# Patient Record
Sex: FEMALE | Race: BLACK OR AFRICAN AMERICAN | NOT HISPANIC OR LATINO | Employment: OTHER | URBAN - METROPOLITAN AREA
[De-identification: names, ages, dates, MRNs, and addresses within clinical notes are randomized per-mention and may not be internally consistent; named-entity substitution may affect disease eponyms.]

---

## 2018-06-04 ENCOUNTER — CONVERSION ENCOUNTER (OUTPATIENT)
Dept: FAMILY MEDICINE CLINIC | Facility: CLINIC | Age: 83
End: 2018-06-04

## 2018-06-04 ENCOUNTER — OFFICE VISIT CONVERTED (OUTPATIENT)
Dept: FAMILY MEDICINE CLINIC | Facility: CLINIC | Age: 83
End: 2018-06-04
Attending: PHYSICIAN ASSISTANT

## 2018-06-05 ENCOUNTER — OFFICE VISIT CONVERTED (OUTPATIENT)
Dept: CARDIOLOGY | Facility: CLINIC | Age: 83
End: 2018-06-05
Attending: INTERNAL MEDICINE

## 2018-06-27 ENCOUNTER — OFFICE VISIT CONVERTED (OUTPATIENT)
Dept: PULMONOLOGY | Facility: CLINIC | Age: 83
End: 2018-06-27
Attending: INTERNAL MEDICINE

## 2018-08-10 ENCOUNTER — OFFICE VISIT CONVERTED (OUTPATIENT)
Dept: FAMILY MEDICINE CLINIC | Facility: CLINIC | Age: 83
End: 2018-08-10
Attending: PHYSICIAN ASSISTANT

## 2018-10-09 ENCOUNTER — CONVERSION ENCOUNTER (OUTPATIENT)
Dept: GENERAL RADIOLOGY | Facility: HOSPITAL | Age: 83
End: 2018-10-09

## 2018-10-17 ENCOUNTER — OFFICE VISIT CONVERTED (OUTPATIENT)
Dept: PULMONOLOGY | Facility: CLINIC | Age: 83
End: 2018-10-17
Attending: INTERNAL MEDICINE

## 2018-10-19 ENCOUNTER — CONVERSION ENCOUNTER (OUTPATIENT)
Dept: CARDIOLOGY | Facility: CLINIC | Age: 83
End: 2018-10-19

## 2018-10-19 ENCOUNTER — OFFICE VISIT CONVERTED (OUTPATIENT)
Dept: CARDIOLOGY | Facility: CLINIC | Age: 83
End: 2018-10-19
Attending: NURSE PRACTITIONER

## 2018-12-04 ENCOUNTER — OFFICE VISIT CONVERTED (OUTPATIENT)
Dept: FAMILY MEDICINE CLINIC | Facility: CLINIC | Age: 83
End: 2018-12-04
Attending: PHYSICIAN ASSISTANT

## 2019-01-29 ENCOUNTER — OFFICE VISIT CONVERTED (OUTPATIENT)
Dept: PULMONOLOGY | Facility: CLINIC | Age: 84
End: 2019-01-29
Attending: INTERNAL MEDICINE

## 2019-04-12 ENCOUNTER — HOSPITAL ENCOUNTER (OUTPATIENT)
Dept: LAB | Facility: HOSPITAL | Age: 84
Discharge: HOME | End: 2019-04-12
Attending: NURSE PRACTITIONER

## 2019-04-12 LAB
ALBUMIN SERPL-MCNC: 3.9 G/DL (ref 3.5–5)
ALBUMIN/GLOB SERPL: 1.2 {RATIO} (ref 1.4–2.6)
ALP SERPL-CCNC: 117 U/L (ref 43–160)
ALT SERPL-CCNC: 6 U/L (ref 10–40)
ANION GAP SERPL CALC-SCNC: 17 MMOL/L (ref 8–19)
AST SERPL-CCNC: 16 U/L (ref 15–50)
BASOPHILS # BLD AUTO: 0.03 10*3/UL (ref 0–0.2)
BASOPHILS NFR BLD AUTO: 0.6 % (ref 0–3)
BILIRUB SERPL-MCNC: 0.37 MG/DL (ref 0.2–1.3)
BUN SERPL-MCNC: 18 MG/DL (ref 5–25)
BUN/CREAT SERPL: 23 {RATIO} (ref 6–20)
CALCIUM SERPL-MCNC: 9.4 MG/DL (ref 8.7–10.4)
CHLORIDE SERPL-SCNC: 105 MMOL/L (ref 99–111)
CHOLEST SERPL-MCNC: 142 MG/DL (ref 107–200)
CHOLEST/HDLC SERPL: 1.8 {RATIO} (ref 3–6)
CONV ABS IMM GRAN: 0.01 10*3/UL (ref 0–0.2)
CONV CO2: 29 MMOL/L (ref 22–32)
CONV IMMATURE GRAN: 0.2 % (ref 0–1.8)
CONV TOTAL PROTEIN: 7.1 G/DL (ref 6.3–8.2)
CREAT UR-MCNC: 0.78 MG/DL (ref 0.5–0.9)
DEPRECATED RDW RBC AUTO: 51.1 FL (ref 36.4–46.3)
EOSINOPHIL # BLD AUTO: 0.14 10*3/UL (ref 0–0.7)
EOSINOPHIL # BLD AUTO: 2.7 % (ref 0–7)
ERYTHROCYTE [DISTWIDTH] IN BLOOD BY AUTOMATED COUNT: 14.9 % (ref 11.7–14.4)
GFR SERPLBLD BASED ON 1.73 SQ M-ARVRAT: >60 ML/MIN/{1.73_M2}
GLOBULIN UR ELPH-MCNC: 3.2 G/DL (ref 2–3.5)
GLUCOSE SERPL-MCNC: 85 MG/DL (ref 65–99)
HBA1C MFR BLD: 14.6 G/DL (ref 12–16)
HCT VFR BLD AUTO: 47.5 % (ref 37–47)
HDLC SERPL-MCNC: 77 MG/DL (ref 40–60)
LDLC SERPL CALC-MCNC: 57 MG/DL (ref 70–100)
LYMPHOCYTES # BLD AUTO: 1.14 10*3/UL (ref 1–5)
MCH RBC QN AUTO: 28.5 PG (ref 27–31)
MCHC RBC AUTO-ENTMCNC: 30.7 G/DL (ref 33–37)
MCV RBC AUTO: 92.8 FL (ref 81–99)
MONOCYTES # BLD AUTO: 0.51 10*3/UL (ref 0.2–1.2)
MONOCYTES NFR BLD AUTO: 9.7 % (ref 3–10)
NEUTROPHILS # BLD AUTO: 3.43 10*3/UL (ref 2–8)
NEUTROPHILS NFR BLD AUTO: 65.1 % (ref 30–85)
NRBC CBCN: 0 % (ref 0–0.7)
OSMOLALITY SERPL CALC.SUM OF ELEC: 305 MOSM/KG (ref 273–304)
PLATELET # BLD AUTO: 228 10*3/UL (ref 130–400)
PMV BLD AUTO: 9.9 FL (ref 9.4–12.3)
POTASSIUM SERPL-SCNC: 3.6 MMOL/L (ref 3.5–5.3)
RBC # BLD AUTO: 5.12 10*6/UL (ref 4.2–5.4)
SODIUM SERPL-SCNC: 147 MMOL/L (ref 135–147)
TRIGL SERPL-MCNC: 38 MG/DL (ref 40–150)
VARIANT LYMPHS NFR BLD MANUAL: 21.7 % (ref 20–45)
VLDLC SERPL-MCNC: 8 MG/DL (ref 5–37)
WBC # BLD AUTO: 5.26 10*3/UL (ref 4.8–10.8)

## 2019-04-19 ENCOUNTER — OFFICE VISIT CONVERTED (OUTPATIENT)
Dept: CARDIOLOGY | Facility: CLINIC | Age: 84
End: 2019-04-19
Attending: NURSE PRACTITIONER

## 2019-06-04 ENCOUNTER — OFFICE VISIT CONVERTED (OUTPATIENT)
Dept: FAMILY MEDICINE CLINIC | Facility: CLINIC | Age: 84
End: 2019-06-04
Attending: PHYSICIAN ASSISTANT

## 2019-08-06 ENCOUNTER — HOSPITAL ENCOUNTER (OUTPATIENT)
Dept: LAB | Facility: HOSPITAL | Age: 84
Discharge: HOME | End: 2019-08-06
Attending: PHYSICIAN ASSISTANT

## 2019-08-06 ENCOUNTER — OFFICE VISIT CONVERTED (OUTPATIENT)
Dept: PULMONOLOGY | Facility: CLINIC | Age: 84
End: 2019-08-06
Attending: PHYSICIAN ASSISTANT

## 2019-08-08 LAB — IGE SERPL-ACNC: 701 K[IU]/ML (ref 0–24)

## 2019-08-19 ENCOUNTER — OFFICE VISIT CONVERTED (OUTPATIENT)
Dept: FAMILY MEDICINE CLINIC | Facility: CLINIC | Age: 84
End: 2019-08-19
Attending: PHYSICIAN ASSISTANT

## 2019-08-19 ENCOUNTER — CONVERSION ENCOUNTER (OUTPATIENT)
Dept: FAMILY MEDICINE CLINIC | Facility: CLINIC | Age: 84
End: 2019-08-19

## 2019-08-29 ENCOUNTER — OFFICE VISIT CONVERTED (OUTPATIENT)
Dept: PULMONOLOGY | Facility: CLINIC | Age: 84
End: 2019-08-29
Attending: PHYSICIAN ASSISTANT

## 2019-10-18 ENCOUNTER — OFFICE VISIT CONVERTED (OUTPATIENT)
Dept: CARDIOLOGY | Facility: CLINIC | Age: 84
End: 2019-10-18
Attending: NURSE PRACTITIONER

## 2019-10-31 ENCOUNTER — HOSPITAL ENCOUNTER (OUTPATIENT)
Dept: LAB | Facility: HOSPITAL | Age: 84
Discharge: HOME | End: 2019-10-31
Attending: NURSE PRACTITIONER

## 2019-10-31 LAB
ALBUMIN SERPL-MCNC: 4.1 G/DL (ref 3.5–5)
ALBUMIN/GLOB SERPL: 1.4 {RATIO} (ref 1.4–2.6)
ALP SERPL-CCNC: 119 U/L (ref 43–160)
ALT SERPL-CCNC: <5 U/L (ref 10–40)
ANION GAP SERPL CALC-SCNC: 19 MMOL/L (ref 8–19)
AST SERPL-CCNC: 13 U/L (ref 15–50)
BASOPHILS # BLD AUTO: 0.04 10*3/UL (ref 0–0.2)
BASOPHILS NFR BLD AUTO: 0.8 % (ref 0–3)
BILIRUB SERPL-MCNC: 0.42 MG/DL (ref 0.2–1.3)
BUN SERPL-MCNC: 21 MG/DL (ref 5–25)
BUN/CREAT SERPL: 28 {RATIO} (ref 6–20)
CALCIUM SERPL-MCNC: 9.9 MG/DL (ref 8.7–10.4)
CHLORIDE SERPL-SCNC: 104 MMOL/L (ref 99–111)
CHOLEST SERPL-MCNC: 159 MG/DL (ref 107–200)
CHOLEST/HDLC SERPL: 2.2 {RATIO} (ref 3–6)
CONV ABS IMM GRAN: 0.02 10*3/UL (ref 0–0.2)
CONV CO2: 26 MMOL/L (ref 22–32)
CONV IMMATURE GRAN: 0.4 % (ref 0–1.8)
CONV TOTAL PROTEIN: 7.1 G/DL (ref 6.3–8.2)
CREAT UR-MCNC: 0.74 MG/DL (ref 0.5–0.9)
DEPRECATED RDW RBC AUTO: 51.2 FL (ref 36.4–46.3)
EOSINOPHIL # BLD AUTO: 0.25 10*3/UL (ref 0–0.7)
EOSINOPHIL # BLD AUTO: 4.7 % (ref 0–7)
ERYTHROCYTE [DISTWIDTH] IN BLOOD BY AUTOMATED COUNT: 15.4 % (ref 11.7–14.4)
GFR SERPLBLD BASED ON 1.73 SQ M-ARVRAT: >60 ML/MIN/{1.73_M2}
GLOBULIN UR ELPH-MCNC: 3 G/DL (ref 2–3.5)
GLUCOSE SERPL-MCNC: 94 MG/DL (ref 65–99)
HCT VFR BLD AUTO: 46.2 % (ref 37–47)
HDLC SERPL-MCNC: 73 MG/DL (ref 40–60)
HGB BLD-MCNC: 13.9 G/DL (ref 12–16)
LDLC SERPL CALC-MCNC: 75 MG/DL (ref 70–100)
LYMPHOCYTES # BLD AUTO: 1.47 10*3/UL (ref 1–5)
LYMPHOCYTES NFR BLD AUTO: 27.9 % (ref 20–45)
MCH RBC QN AUTO: 27.4 PG (ref 27–31)
MCHC RBC AUTO-ENTMCNC: 30.1 G/DL (ref 33–37)
MCV RBC AUTO: 91.1 FL (ref 81–99)
MONOCYTES # BLD AUTO: 0.54 10*3/UL (ref 0.2–1.2)
MONOCYTES NFR BLD AUTO: 10.2 % (ref 3–10)
NEUTROPHILS # BLD AUTO: 2.95 10*3/UL (ref 2–8)
NEUTROPHILS NFR BLD AUTO: 56 % (ref 30–85)
NRBC CBCN: 0 % (ref 0–0.7)
OSMOLALITY SERPL CALC.SUM OF ELEC: 303 MOSM/KG (ref 273–304)
PLATELET # BLD AUTO: 245 10*3/UL (ref 130–400)
PMV BLD AUTO: 10 FL (ref 9.4–12.3)
POTASSIUM SERPL-SCNC: 3.8 MMOL/L (ref 3.5–5.3)
RBC # BLD AUTO: 5.07 10*6/UL (ref 4.2–5.4)
SODIUM SERPL-SCNC: 145 MMOL/L (ref 135–147)
TRIGL SERPL-MCNC: 55 MG/DL (ref 40–150)
VLDLC SERPL-MCNC: 11 MG/DL (ref 5–37)
WBC # BLD AUTO: 5.27 10*3/UL (ref 4.8–10.8)

## 2019-12-05 ENCOUNTER — OFFICE VISIT CONVERTED (OUTPATIENT)
Dept: FAMILY MEDICINE CLINIC | Facility: CLINIC | Age: 84
End: 2019-12-05
Attending: PHYSICIAN ASSISTANT

## 2019-12-09 ENCOUNTER — OFFICE VISIT CONVERTED (OUTPATIENT)
Dept: NEUROLOGY | Facility: CLINIC | Age: 84
End: 2019-12-09
Attending: PSYCHIATRY & NEUROLOGY

## 2019-12-16 ENCOUNTER — HOSPITAL ENCOUNTER (OUTPATIENT)
Dept: GENERAL RADIOLOGY | Facility: HOSPITAL | Age: 84
Discharge: HOME | End: 2019-12-16
Attending: PSYCHIATRY & NEUROLOGY

## 2020-01-13 ENCOUNTER — OFFICE VISIT CONVERTED (OUTPATIENT)
Dept: NEUROLOGY | Facility: CLINIC | Age: 85
End: 2020-01-13
Attending: PSYCHIATRY & NEUROLOGY

## 2020-02-06 ENCOUNTER — OFFICE VISIT CONVERTED (OUTPATIENT)
Dept: PULMONOLOGY | Facility: CLINIC | Age: 85
End: 2020-02-06
Attending: INTERNAL MEDICINE

## 2020-03-16 ENCOUNTER — CONVERSION ENCOUNTER (OUTPATIENT)
Dept: FAMILY MEDICINE CLINIC | Facility: CLINIC | Age: 85
End: 2020-03-16

## 2020-03-16 ENCOUNTER — OFFICE VISIT CONVERTED (OUTPATIENT)
Dept: FAMILY MEDICINE CLINIC | Facility: CLINIC | Age: 85
End: 2020-03-16
Attending: PHYSICIAN ASSISTANT

## 2020-04-21 ENCOUNTER — TELEMEDICINE CONVERTED (OUTPATIENT)
Dept: CARDIOLOGY | Facility: CLINIC | Age: 85
End: 2020-04-21
Attending: NURSE PRACTITIONER

## 2020-05-22 ENCOUNTER — OFFICE VISIT CONVERTED (OUTPATIENT)
Dept: PULMONOLOGY | Facility: CLINIC | Age: 85
End: 2020-05-22
Attending: INTERNAL MEDICINE

## 2020-09-23 ENCOUNTER — OFFICE VISIT CONVERTED (OUTPATIENT)
Dept: PULMONOLOGY | Facility: CLINIC | Age: 85
End: 2020-09-23
Attending: NURSE PRACTITIONER

## 2020-10-16 ENCOUNTER — CONVERSION ENCOUNTER (OUTPATIENT)
Dept: NEUROLOGY | Facility: CLINIC | Age: 85
End: 2020-10-16

## 2020-10-16 ENCOUNTER — CONVERSION ENCOUNTER (OUTPATIENT)
Dept: OTHER | Facility: HOSPITAL | Age: 85
End: 2020-10-16

## 2020-10-16 ENCOUNTER — OFFICE VISIT CONVERTED (OUTPATIENT)
Dept: NEUROLOGY | Facility: CLINIC | Age: 85
End: 2020-10-16
Attending: PSYCHIATRY & NEUROLOGY

## 2020-10-22 ENCOUNTER — HOSPITAL ENCOUNTER (OUTPATIENT)
Dept: LAB | Facility: HOSPITAL | Age: 85
Discharge: HOME | End: 2020-10-22
Attending: PHYSICIAN ASSISTANT

## 2020-10-22 LAB
ALBUMIN SERPL-MCNC: 4 G/DL (ref 3.5–5)
ALBUMIN/GLOB SERPL: 1.3 {RATIO} (ref 1.4–2.6)
ALP SERPL-CCNC: 118 U/L (ref 43–160)
ALT SERPL-CCNC: <5 U/L (ref 10–40)
ANION GAP SERPL CALC-SCNC: 14 MMOL/L (ref 8–19)
AST SERPL-CCNC: 15 U/L (ref 15–50)
BASOPHILS # BLD AUTO: 0.02 10*3/UL (ref 0–0.2)
BASOPHILS NFR BLD AUTO: 0.4 % (ref 0–3)
BILIRUB SERPL-MCNC: 0.45 MG/DL (ref 0.2–1.3)
BUN SERPL-MCNC: 19 MG/DL (ref 5–25)
BUN/CREAT SERPL: 24 {RATIO} (ref 6–20)
CALCIUM SERPL-MCNC: 9.9 MG/DL (ref 8.7–10.4)
CHLORIDE SERPL-SCNC: 102 MMOL/L (ref 99–111)
CHOLEST SERPL-MCNC: 148 MG/DL (ref 107–200)
CHOLEST/HDLC SERPL: 2 {RATIO} (ref 3–6)
CONV ABS IMM GRAN: 0.01 10*3/UL (ref 0–0.2)
CONV CO2: 28 MMOL/L (ref 22–32)
CONV IMMATURE GRAN: 0.2 % (ref 0–1.8)
CONV TOTAL PROTEIN: 7.1 G/DL (ref 6.3–8.2)
CREAT UR-MCNC: 0.79 MG/DL (ref 0.5–0.9)
DEPRECATED RDW RBC AUTO: 45.7 FL (ref 36.4–46.3)
EOSINOPHIL # BLD AUTO: 0.31 10*3/UL (ref 0–0.7)
EOSINOPHIL # BLD AUTO: 5.5 % (ref 0–7)
ERYTHROCYTE [DISTWIDTH] IN BLOOD BY AUTOMATED COUNT: 14.6 % (ref 11.7–14.4)
GFR SERPLBLD BASED ON 1.73 SQ M-ARVRAT: >60 ML/MIN/{1.73_M2}
GLOBULIN UR ELPH-MCNC: 3.1 G/DL (ref 2–3.5)
GLUCOSE SERPL-MCNC: 89 MG/DL (ref 65–99)
HCT VFR BLD AUTO: 43.3 % (ref 37–47)
HDLC SERPL-MCNC: 73 MG/DL (ref 40–60)
HGB BLD-MCNC: 13 G/DL (ref 12–16)
LDLC SERPL CALC-MCNC: 64 MG/DL (ref 70–100)
LYMPHOCYTES # BLD AUTO: 1.44 10*3/UL (ref 1–5)
LYMPHOCYTES NFR BLD AUTO: 25.5 % (ref 20–45)
MCH RBC QN AUTO: 26.1 PG (ref 27–31)
MCHC RBC AUTO-ENTMCNC: 30 G/DL (ref 33–37)
MCV RBC AUTO: 86.8 FL (ref 81–99)
MONOCYTES # BLD AUTO: 0.59 10*3/UL (ref 0.2–1.2)
MONOCYTES NFR BLD AUTO: 10.4 % (ref 3–10)
NEUTROPHILS # BLD AUTO: 3.28 10*3/UL (ref 2–8)
NEUTROPHILS NFR BLD AUTO: 58 % (ref 30–85)
NRBC CBCN: 0 % (ref 0–0.7)
OSMOLALITY SERPL CALC.SUM OF ELEC: 292 MOSM/KG (ref 273–304)
PLATELET # BLD AUTO: 252 10*3/UL (ref 130–400)
PMV BLD AUTO: 9.8 FL (ref 9.4–12.3)
POTASSIUM SERPL-SCNC: 4.2 MMOL/L (ref 3.5–5.3)
RBC # BLD AUTO: 4.99 10*6/UL (ref 4.2–5.4)
SODIUM SERPL-SCNC: 140 MMOL/L (ref 135–147)
TRIGL SERPL-MCNC: 56 MG/DL (ref 40–150)
VLDLC SERPL-MCNC: 11 MG/DL (ref 5–37)
WBC # BLD AUTO: 5.65 10*3/UL (ref 4.8–10.8)

## 2020-10-30 ENCOUNTER — OFFICE VISIT CONVERTED (OUTPATIENT)
Dept: CARDIOLOGY | Facility: CLINIC | Age: 85
End: 2020-10-30
Attending: NURSE PRACTITIONER

## 2020-11-09 ENCOUNTER — TELEMEDICINE CONVERTED (OUTPATIENT)
Dept: FAMILY MEDICINE CLINIC | Facility: CLINIC | Age: 85
End: 2020-11-09
Attending: PHYSICIAN ASSISTANT

## 2020-11-23 ENCOUNTER — HOSPITAL ENCOUNTER (OUTPATIENT)
Dept: OTHER | Facility: HOSPITAL | Age: 85
Discharge: HOME | End: 2020-11-23
Attending: PHYSICIAN ASSISTANT

## 2020-11-23 LAB
ALBUMIN SERPL-MCNC: 3 G/DL (ref 3.5–5)
ALBUMIN/GLOB SERPL: 0.8 {RATIO} (ref 1.4–2.6)
ALP SERPL-CCNC: 105 U/L (ref 43–160)
ALT SERPL-CCNC: <5 U/L (ref 10–40)
ANION GAP SERPL CALC-SCNC: 15 MMOL/L (ref 8–19)
AST SERPL-CCNC: 17 U/L (ref 15–50)
BASOPHILS # BLD AUTO: 0.03 10*3/UL (ref 0–0.2)
BASOPHILS NFR BLD AUTO: 0.5 % (ref 0–3)
BILIRUB SERPL-MCNC: 0.38 MG/DL (ref 0.2–1.3)
BUN SERPL-MCNC: 30 MG/DL (ref 5–25)
BUN/CREAT SERPL: 23 {RATIO} (ref 6–20)
CALCIUM SERPL-MCNC: 10.3 MG/DL (ref 8.7–10.4)
CHLORIDE SERPL-SCNC: 106 MMOL/L (ref 99–111)
CONV ABS IMM GRAN: 0.03 10*3/UL (ref 0–0.2)
CONV CO2: 23 MMOL/L (ref 22–32)
CONV IMMATURE GRAN: 0.5 % (ref 0–1.8)
CONV TOTAL PROTEIN: 6.8 G/DL (ref 6.3–8.2)
CREAT UR-MCNC: 1.3 MG/DL (ref 0.5–0.9)
DEPRECATED RDW RBC AUTO: 48.6 FL (ref 36.4–46.3)
EOSINOPHIL # BLD AUTO: 0.06 10*3/UL (ref 0–0.7)
EOSINOPHIL # BLD AUTO: 1 % (ref 0–7)
ERYTHROCYTE [DISTWIDTH] IN BLOOD BY AUTOMATED COUNT: 15.3 % (ref 11.7–14.4)
GFR SERPLBLD BASED ON 1.73 SQ M-ARVRAT: 42 ML/MIN/{1.73_M2}
GLOBULIN UR ELPH-MCNC: 3.8 G/DL (ref 2–3.5)
GLUCOSE SERPL-MCNC: 101 MG/DL (ref 65–99)
HCT VFR BLD AUTO: 44 % (ref 37–47)
HGB BLD-MCNC: 12.9 G/DL (ref 12–16)
LYMPHOCYTES # BLD AUTO: 0.78 10*3/UL (ref 1–5)
LYMPHOCYTES NFR BLD AUTO: 13 % (ref 20–45)
MCH RBC QN AUTO: 25.5 PG (ref 27–31)
MCHC RBC AUTO-ENTMCNC: 29.3 G/DL (ref 33–37)
MCV RBC AUTO: 87.1 FL (ref 81–99)
MONOCYTES # BLD AUTO: 0.45 10*3/UL (ref 0.2–1.2)
MONOCYTES NFR BLD AUTO: 7.5 % (ref 3–10)
NEUTROPHILS # BLD AUTO: 4.67 10*3/UL (ref 2–8)
NEUTROPHILS NFR BLD AUTO: 77.5 % (ref 30–85)
NRBC CBCN: 0 % (ref 0–0.7)
OSMOLALITY SERPL CALC.SUM OF ELEC: 292 MOSM/KG (ref 273–304)
PLATELET # BLD AUTO: 404 10*3/UL (ref 130–400)
PMV BLD AUTO: 10.3 FL (ref 9.4–12.3)
POTASSIUM SERPL-SCNC: 5.7 MMOL/L (ref 3.5–5.3)
RBC # BLD AUTO: 5.05 10*6/UL (ref 4.2–5.4)
SODIUM SERPL-SCNC: 138 MMOL/L (ref 135–147)
WBC # BLD AUTO: 6.02 10*3/UL (ref 4.8–10.8)

## 2020-11-25 ENCOUNTER — HOSPITAL ENCOUNTER (OUTPATIENT)
Dept: OTHER | Facility: HOSPITAL | Age: 85
Discharge: HOME | End: 2020-11-25
Attending: PHYSICIAN ASSISTANT

## 2020-11-25 LAB
APPEARANCE UR: ABNORMAL
BILIRUB UR QL: NEGATIVE
COLOR UR: YELLOW
CONV BACTERIA: ABNORMAL
CONV COLLECTION SOURCE (UA): ABNORMAL
CONV HYALINE CASTS IN URINE MICRO: ABNORMAL /[LPF]
CONV UROBILINOGEN IN URINE BY AUTOMATED TEST STRIP: 0.2 {EHRLICHU}/DL (ref 0.1–1)
GLUCOSE UR QL: NEGATIVE MG/DL
HGB UR QL STRIP: NEGATIVE
KETONES UR QL STRIP: NEGATIVE MG/DL
LEUKOCYTE ESTERASE UR QL STRIP: NEGATIVE
NITRITE UR QL STRIP: NEGATIVE
PH UR STRIP.AUTO: 5 [PH] (ref 5–8)
PROT UR QL: 30 MG/DL
RBC #/AREA URNS HPF: ABNORMAL /[HPF]
SP GR UR: 1.02 (ref 1–1.03)
SQUAMOUS SPT QL MICRO: ABNORMAL /[HPF]
WBC #/AREA URNS HPF: ABNORMAL /[HPF]

## 2020-11-27 LAB — BACTERIA UR CULT: NORMAL

## 2020-12-02 ENCOUNTER — HOSPITAL ENCOUNTER (OUTPATIENT)
Dept: OTHER | Facility: HOSPITAL | Age: 85
Discharge: HOME | End: 2020-12-02
Attending: PHYSICIAN ASSISTANT

## 2020-12-02 LAB
APPEARANCE UR: CLEAR
BILIRUB UR QL: NEGATIVE
COLOR UR: YELLOW
CONV BACTERIA: NEGATIVE
CONV COLLECTION SOURCE (UA): ABNORMAL
CONV UROBILINOGEN IN URINE BY AUTOMATED TEST STRIP: 0.2 {EHRLICHU}/DL (ref 0.1–1)
GLUCOSE UR QL: NEGATIVE MG/DL
HGB UR QL STRIP: ABNORMAL
KETONES UR QL STRIP: NEGATIVE MG/DL
LEUKOCYTE ESTERASE UR QL STRIP: NEGATIVE
NITRITE UR QL STRIP: NEGATIVE
PH UR STRIP.AUTO: 5 [PH] (ref 5–8)
PROT UR QL: NEGATIVE MG/DL
RBC #/AREA URNS HPF: ABNORMAL /[HPF]
SP GR UR: 1.01 (ref 1–1.03)
SQUAMOUS SPT QL MICRO: ABNORMAL /[HPF]
WBC #/AREA URNS HPF: ABNORMAL /[HPF]

## 2020-12-03 LAB — BACTERIA UR CULT: NORMAL

## 2020-12-17 ENCOUNTER — OFFICE VISIT CONVERTED (OUTPATIENT)
Dept: FAMILY MEDICINE CLINIC | Facility: CLINIC | Age: 85
End: 2020-12-17
Attending: PHYSICIAN ASSISTANT

## 2021-01-01 ENCOUNTER — OFFICE VISIT (OUTPATIENT)
Dept: FAMILY MEDICINE CLINIC | Facility: CLINIC | Age: 86
End: 2021-01-01

## 2021-01-01 DIAGNOSIS — J44.9 ASTHMA-COPD OVERLAP SYNDROME (HCC): ICD-10-CM

## 2021-01-01 DIAGNOSIS — Z00.00 MEDICARE ANNUAL WELLNESS VISIT, SUBSEQUENT: Primary | ICD-10-CM

## 2021-01-01 PROCEDURE — 1159F MED LIST DOCD IN RCRD: CPT | Performed by: PHYSICIAN ASSISTANT

## 2021-01-01 PROCEDURE — G0439 PPPS, SUBSEQ VISIT: HCPCS | Performed by: PHYSICIAN ASSISTANT

## 2021-01-01 PROCEDURE — 1125F AMNT PAIN NOTED PAIN PRSNT: CPT | Performed by: PHYSICIAN ASSISTANT

## 2021-01-01 PROCEDURE — 1170F FXNL STATUS ASSESSED: CPT | Performed by: PHYSICIAN ASSISTANT

## 2021-01-01 RX ORDER — TIOTROPIUM BROMIDE AND OLODATEROL 3.124; 2.736 UG/1; UG/1
SPRAY, METERED RESPIRATORY (INHALATION)
Qty: 4 G | Refills: 5 | Status: SHIPPED | OUTPATIENT
Start: 2021-01-01

## 2021-01-01 RX ORDER — AMLODIPINE BESYLATE AND BENAZEPRIL HYDROCHLORIDE 5; 20 MG/1; MG/1
CAPSULE ORAL
Qty: 90 CAPSULE | Refills: 1 | Status: SHIPPED | OUTPATIENT
Start: 2021-01-01 | End: 2022-01-01

## 2021-01-01 RX ORDER — MONTELUKAST SODIUM 10 MG/1
TABLET ORAL
Qty: 30 TABLET | Refills: 3 | Status: SHIPPED | OUTPATIENT
Start: 2021-01-01 | End: 2022-01-01

## 2021-01-01 RX ORDER — APIXABAN 2.5 MG/1
TABLET, FILM COATED ORAL
Qty: 180 TABLET | Refills: 1 | Status: SHIPPED | OUTPATIENT
Start: 2021-01-01 | End: 2022-01-01

## 2021-01-01 RX ORDER — SPIRONOLACTONE 25 MG/1
TABLET ORAL
Qty: 90 TABLET | Refills: 3 | Status: SHIPPED | OUTPATIENT
Start: 2021-01-01 | End: 2022-01-01

## 2021-01-26 ENCOUNTER — OFFICE VISIT CONVERTED (OUTPATIENT)
Dept: PULMONOLOGY | Facility: CLINIC | Age: 86
End: 2021-01-26
Attending: INTERNAL MEDICINE

## 2021-02-12 ENCOUNTER — HOSPITAL ENCOUNTER (OUTPATIENT)
Dept: VACCINE CLINIC | Facility: HOSPITAL | Age: 86
Discharge: HOME | End: 2021-02-12
Attending: INTERNAL MEDICINE

## 2021-03-12 ENCOUNTER — HOSPITAL ENCOUNTER (OUTPATIENT)
Dept: VACCINE CLINIC | Facility: HOSPITAL | Age: 86
Discharge: HOME | End: 2021-03-12
Attending: INTERNAL MEDICINE

## 2021-04-19 ENCOUNTER — CONVERSION ENCOUNTER (OUTPATIENT)
Dept: FAMILY MEDICINE CLINIC | Facility: CLINIC | Age: 86
End: 2021-04-19

## 2021-04-19 ENCOUNTER — OFFICE VISIT CONVERTED (OUTPATIENT)
Dept: FAMILY MEDICINE CLINIC | Facility: CLINIC | Age: 86
End: 2021-04-19
Attending: PHYSICIAN ASSISTANT

## 2021-04-23 ENCOUNTER — HOSPITAL ENCOUNTER (OUTPATIENT)
Dept: LAB | Facility: HOSPITAL | Age: 86
Discharge: HOME | End: 2021-04-23
Attending: NURSE PRACTITIONER

## 2021-04-23 LAB
ALBUMIN SERPL-MCNC: 4.1 G/DL (ref 3.5–5)
ALBUMIN/GLOB SERPL: 1.4 {RATIO} (ref 1.4–2.6)
ALP SERPL-CCNC: 108 U/L (ref 43–160)
ALT SERPL-CCNC: 7 U/L (ref 10–40)
ANION GAP SERPL CALC-SCNC: 12 MMOL/L (ref 8–19)
APPEARANCE UR: CLEAR
AST SERPL-CCNC: 20 U/L (ref 15–50)
BASOPHILS # BLD AUTO: 0.02 10*3/UL (ref 0–0.2)
BASOPHILS NFR BLD AUTO: 0.5 % (ref 0–3)
BILIRUB SERPL-MCNC: 0.32 MG/DL (ref 0.2–1.3)
BILIRUB UR QL: NEGATIVE
BUN SERPL-MCNC: 22 MG/DL (ref 5–25)
BUN/CREAT SERPL: 22 {RATIO} (ref 6–20)
CALCIUM SERPL-MCNC: 9.4 MG/DL (ref 8.7–10.4)
CHLORIDE SERPL-SCNC: 103 MMOL/L (ref 99–111)
CHOLEST SERPL-MCNC: 148 MG/DL (ref 107–200)
CHOLEST/HDLC SERPL: 1.8 {RATIO} (ref 3–6)
COLOR UR: YELLOW
CONV ABS IMM GRAN: 0.01 10*3/UL (ref 0–0.2)
CONV CO2: 29 MMOL/L (ref 22–32)
CONV COLLECTION SOURCE (UA): NORMAL
CONV IMMATURE GRAN: 0.3 % (ref 0–1.8)
CONV TOTAL PROTEIN: 7 G/DL (ref 6.3–8.2)
CONV UROBILINOGEN IN URINE BY AUTOMATED TEST STRIP: 0.2 {EHRLICHU}/DL (ref 0.1–1)
CREAT UR-MCNC: 1.01 MG/DL (ref 0.5–0.9)
DEPRECATED RDW RBC AUTO: 46.6 FL (ref 36.4–46.3)
EOSINOPHIL # BLD AUTO: 0.2 10*3/UL (ref 0–0.7)
EOSINOPHIL # BLD AUTO: 5.2 % (ref 0–7)
ERYTHROCYTE [DISTWIDTH] IN BLOOD BY AUTOMATED COUNT: 13.8 % (ref 11.7–14.4)
GFR SERPLBLD BASED ON 1.73 SQ M-ARVRAT: 57 ML/MIN/{1.73_M2}
GLOBULIN UR ELPH-MCNC: 2.9 G/DL (ref 2–3.5)
GLUCOSE SERPL-MCNC: 79 MG/DL (ref 65–99)
GLUCOSE UR QL: NEGATIVE MG/DL
HCT VFR BLD AUTO: 40.2 % (ref 37–47)
HDLC SERPL-MCNC: 81 MG/DL (ref 40–60)
HGB BLD-MCNC: 12.2 G/DL (ref 12–16)
HGB UR QL STRIP: NEGATIVE
KETONES UR QL STRIP: NEGATIVE MG/DL
LDLC SERPL CALC-MCNC: 58 MG/DL (ref 70–100)
LEUKOCYTE ESTERASE UR QL STRIP: NEGATIVE
LYMPHOCYTES # BLD AUTO: 0.99 10*3/UL (ref 1–5)
LYMPHOCYTES NFR BLD AUTO: 25.6 % (ref 20–45)
MAGNESIUM SERPL-MCNC: 1.99 MG/DL (ref 1.6–2.3)
MCH RBC QN AUTO: 27.7 PG (ref 27–31)
MCHC RBC AUTO-ENTMCNC: 30.3 G/DL (ref 33–37)
MCV RBC AUTO: 91.2 FL (ref 81–99)
MONOCYTES # BLD AUTO: 0.38 10*3/UL (ref 0.2–1.2)
MONOCYTES NFR BLD AUTO: 9.8 % (ref 3–10)
NEUTROPHILS # BLD AUTO: 2.27 10*3/UL (ref 2–8)
NEUTROPHILS NFR BLD AUTO: 58.6 % (ref 30–85)
NITRITE UR QL STRIP: NEGATIVE
NRBC CBCN: 0 % (ref 0–0.7)
OSMOLALITY SERPL CALC.SUM OF ELEC: 290 MOSM/KG (ref 273–304)
PH UR STRIP.AUTO: 5 [PH] (ref 5–8)
PLATELET # BLD AUTO: 215 10*3/UL (ref 130–400)
PMV BLD AUTO: 9.8 FL (ref 9.4–12.3)
POTASSIUM SERPL-SCNC: 4.7 MMOL/L (ref 3.5–5.3)
PROT UR QL: NEGATIVE MG/DL
RBC # BLD AUTO: 4.41 10*6/UL (ref 4.2–5.4)
SODIUM SERPL-SCNC: 139 MMOL/L (ref 135–147)
SP GR UR: 1.02 (ref 1–1.03)
TRIGL SERPL-MCNC: 47 MG/DL (ref 40–150)
TSH SERPL-ACNC: 4.66 M[IU]/L (ref 0.27–4.2)
VLDLC SERPL-MCNC: 9 MG/DL (ref 5–37)
WBC # BLD AUTO: 3.87 10*3/UL (ref 4.8–10.8)

## 2021-04-30 ENCOUNTER — OFFICE VISIT CONVERTED (OUTPATIENT)
Dept: CARDIOLOGY | Facility: CLINIC | Age: 86
End: 2021-04-30
Attending: NURSE PRACTITIONER

## 2021-05-12 NOTE — PROGRESS NOTES
Quick Note      Patient Name: Stacey Juarez   Patient ID: [id number]   Sex: Female   YOB: 1931    Primary Care Provider: Rayshawn Hewitt PA-C   Referring Provider: Rayshawn Hewitt PA-C    Visit Date: April 21, 2020    Provider: HILDA Terrell   Location: [facility]   Location Address: [address] [place] [address], [address]   CHAN Hdez  [id number]   Location Phone: [phone]          History Of Present Illness  TELEHEALTH TELEPHONE VISIT  Chief Complaint: Shortness of breath.   Stacey Juarez is an 88-year-old female who is presenting for evaluation via telehealth telephone visit due to Covid-19. Verbal consent obtained before beginning visit. The patient continues to be short of breath. It is mild and long term for her. Denies any chest pain or pressure. No palpitations, swelling, dizziness, syncope, PND, or orthopnea. Cardiac-wise she is feeling very well. She is seeing Kaini for her lungs and was started on inhalers, and she feels like her breathing has improved overall.   Provider spent 5 minutes with the patient during telehealth visit.   The following staff were present during this visit: Provider only.   Past Medical History/Overview of Patient Symptoms     PAST MEDICAL HISTORY:  Chronic obstructive pulmonary disease; coronary artery disease with prior myocardial infarction; hyperlipidemia; hypertension; nicotine dependence, paroxysmal atrial fibrillation.      FAMILY HISTORY:  Positive for diabetes, hypertension, and heart disease.     PSYCHOSOCIAL HISTORY:  She does not drink alcohol.  She previously smoked but quit.     CURRENT MEDICATIONS:  Alendronate 70 mg once a week; potassium 20 mEq b.i.d.; Fluoxetine 10 mg aspirin 81 mg every other day; Montelukast 10 mg daily; Spironolactone 25 mg daily; Eliquis 2.5 mg b.i.d.; Metoprolol ER 25 mg daily; magnesium oxide 400 mg b.i.d.; Amlodipine/Benazepril 5/10 mg daily; Atorvastatin 40 mg daily; Spiriva; Donepezil 10 mg daily;  nebulizers treatment; Budesonide; Albuterol.  The dosage and frequency of the medications were reviewed with the patient.     LABS: Not done recently due to not going out for fear of Covid-19.    REVIEW OF SYSTEMS:  Admits shortness of breath.  Denies chest pain, palpitations, swelling, chronic or frequent cough, asthma or wheezing.       Vitals     Patient unable to measure blood pressure at home.           Assessment     1.  Coronary artery disease with previous myocardial infarction without angina.   2.  Shortness of breath related to chronic lung disease, stable.   3.  Paroxysmal atrial fibrillation.   4.  Hyperlipidemia, unknown control.   5.  Hypertension, unknown control.  Patient unable to check her blood pressure at home.       Plan     1.  In a month, she will do labs, check CBC, CMP, and a lipid panel.    2.  Follow up in six months with labs again or earlier if needed.       HILDA York    This note was transcribed by Sujata Boothe. Omer  The above service was transcribed by Sujata Boothe, and I attest to the accuracy of the note.  JF/pap             Electronically Signed by: Randa Boothe-, Other -Author on April 27, 2020 02:06:39 PM  Electronically Co-signed by: HILDA Terrell -Reviewer on April 30, 2020 09:50:36 AM

## 2021-05-13 NOTE — PROGRESS NOTES
Progress Note      Patient Name: Stacey Juarez   Patient ID: [id number]   Sex: Female   YOB: 1931    Primary Care Provider: Rayshawn Hewitt PA-C   Referring Provider: Rayshawn Hewitt PA-C    Visit Date: October 30, 2020    Provider: HILDA Terrell   Location: [facility]   Location Address: [address] [place] [address], [address]   [place] Hdez  [id number]   Location Phone: [phone]          Chief Complaint  · Shortness of breath       History Of Present Illness  REFERRING PROVIDER: Rayshawn Hewitt PA-C   Stacey Juarez is an 89 year old female who continues to be short of breath. It is mild with moderate exertion; that is long term and unchanged for her. She did completely stop smoking since her last visit. She denies any chest pain. No palpitations, swelling, dizziness, syncope, PND or orthopnea. Cardiac-wise she is feeing about the same. She has lost 4 pounds since her last visit and admits she does not really get out because of the COVID.   PAST MEDICAL HISTORY: Chronic obstructive pulmonary disease; coronary artery disease with prior myocardial infarction; hyperlipidemia; hypertension; nicotine dependence; paroxysmal atrial fibrillation.   PSYCHOSOCIAL HISTORY: She never used alcohol. She previously used tobacco but quit.   CURRENT MEDICATIONS: include Alendronate 70 mg once a week; Spironolactone 25 mg daily; Amlodipine/Benazepril 5/20 mg daily; Mag-Ox 400 mg b.i.d.; Metoprolol ER 25 mg 1/2 tablet daily; Potassium 15 mL b.i.d.; Eliquis 2.5 mg b.i.d.; Donepezil 5 mg daily; Montelukast 10 mg daily; Atorvastatin 40 mg daily; Budesonide with nebulizer treatments; Albuterol p.r.n.; Stiolto; aspirin 81 mg every other day. The dosage and frequency of the medications were reviewed with the patient.       Review of Systems  · Cardiovascular  o Admits  o : shortness of breath while walking or lying flat  o Denies  o : palpitations (fast, fluttering, or skipping beats), swelling (feet, ankles, hands),  "chest pain or angina pectoris   · Respiratory  o Denies  o : chronic or frequent cough, asthma or wheezing      Vitals  Date Time BP Position Site L\R Cuff Size HR RR TEMP (F) WT  HT  BMI kg/m2 BSA m2 O2 Sat FR L/min FiO2 HC       10/30/2020 09:28 /82 Sitting    68 - R   135lbs 0oz 5'  3\" 23.91 1.65       10/30/2020 09:28 /78 Sitting    64 - R                   Physical Examination  · Constitutional  o Appearance  o : Awake, alert, in no acute distress, somewhat hard of hearing.  · Eyes  o Conjunctivae  o : Conjunctivae normal.  · Ears, Nose, Mouth and Throat  o Oral Cavity  o :   § Oral Mucosa  § : Normal.  · Neck  o Jugular Veins  o : No JVD. Good carotid upstroke. No bruits noted.  · Respiratory  o Respiratory  o : Increased AP diameter with diminished breath sounds. Prolonged expiration. Negative rales or rhonchi.  · Cardiovascular  o Heart  o : PMI is normal. S1, S2 normal. No S3. S4+. Negative systolic/diastolic murmur.   o Peripheral Vascular System  o :   § Extremities  § : Ecchymosis. Good femoral and pedal pulses.   · Gastrointestinal  o Abdominal Examination  o : Soft. No tenderness or masses felt. No hepatosplenomegaly. Abdominal aorta is not palpable.     Most recent labs:  , TRG 56, LDL 64, HDL 73.               Assessment     1.  Coronary artery disease with previous myocardial infarction, without angina.  2.  Shortness of breath related to chronic lung disease, stable.  3.  Paroxysmal atrial arrhythmias, currently in sinus.  4.  Hypertension, fair control for her age.  5.  Hyperlipidemia at goal.       Plan     1.  Continue current medications.  2.  Follow up in 6 months or earlier if needed with labs.  3.  Encouraged her to get her flu shot as soon as possible.    Karen gutiérrez/axel           This note was transcribed by Lindsey Ireland.  [email or web address]/[email or web address]  The above service was transcribed by Lindsey Ireland, and I attest to the accuracy of the note.  MARGUERITE                 Electronically Signed " by: Lindsey Ireland-, -Author on November 4, 2020 08:42:12 AM  Electronically Co-signed by: HILDA Terrell -Reviewer on November 4, 2020 12:53:00 PM

## 2021-05-13 NOTE — PROGRESS NOTES
Progress Note      Patient Name: Stacey Juarez   Patient ID: [id number]   Sex: Female   YOB: 1931    Primary Care Provider: Rayshawn Hewitt PA-C   Referring Provider: Rayshawn Hewitt PA-C    Visit Date: October 16, 2020    Provider: Waqas Sarabia MD   Location: [facility]   Location Address: [address] [place] [address]  [id number]   Location Phone: [phone]          Chief Complaint     4 mo f/u Alzheimer's.       History Of Present Illness  Stacey Juarez is a 89 year old /Black female who presents today to [facility] today referred from Rayshawn Hewitt PA-C.      89-year-old woman with Alzheimer's disease is here today for follow-up.  She is here by herself.  Her grandson dropped her off who is waiting in the car.  She usually comes here with her daughter who is not here with her today.  She states that she manages herself at home when she lives by herself.  She is unaware of her medications but she shows me a list of her medications.       Past Medical History  Abdominal aortic aneurysm; Abnormal EKG; AMI (acute mesenteric ischemia); Arthritis; Atrial fibrillation; Bladder Disorder; Bladder problem; Cataract; Chronic Obstructive Pulmonary Disease; Colon Trouble; COPD (chronic obstructive pulmonary disease); Coronary artery disease; Dementia; Diverticulitis; Essential hypertension; Heart Attack; Hemorrhoids; Hyperlipemia; Hyperlipidemia; Hypertension; Hypertension, Benign Essential; Kidney or Bladder Disease; Limb Swelling; Myocardial Infarction; Osteoporosis; Osteoporosis; Oxygen dependent; Paroxysmal atrial fibrillation; 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; Vitamin D deficiency         Past Surgical History  Back; Back surgery; Bladder reconstruction; Breast Surgery; Carotid Endarterectomy; Colonoscopy; Eye Implant; Hysterectomy; Joint Surgery; Lumbar Disk Surgery         Medication List  alendronate 70 mg oral tablet;  amlodipine-benazepril 5-20 mg oral capsule; ASA 81 mg; atorvastatin 40 mg oral tablet; budesonide 0.5 mg/2 mL inhalation suspension for nebulization; donepezil 10 mg oral tablet; Eliquis 2.5 mg oral tablet; fluoxetine 10 mg oral capsule; magnesium oxide 400 mg magnesium oral tablet; metoprolol succinate 25 mg oral tablet extended release 24 hr; montelukast 10 mg oral tablet; potassium chloride 20 mEq/15 mL oral liquid; spironolactone 25 mg oral tablet; Stiolto Respimat 2.5-2.5 mcg/actuation inhalation mist; Ventolin HFA 90 mcg/actuation inhalation HFA aerosol inhaler         Allergy List  PENICILLINS         Family Medical History  Heart Disease; Family history of certain chronic disabling diseases; arthritis; Family history of heart disease         Reproductive History   1 Para 1 0 0 1       Social History  Active but no formal exercise; Alcohol (Never); Denies substance abuse (Never); Homemaker.; lives alone; No known infection risk; Recreational Drug Use (Never); Retired; Tobacco (Former);          Immunizations  Name Date Admin   Influenza 2018   Influenza 10/19/2015   Influenza 10/27/2014   Influenza 10/14/2013   Influenza 10/01/2012   Msjxszywv05 2011   Prevnar 13 08/10/2018   Shingles 10/01/2012         Review of Systems  · Constitutional  o Denies  o : chills, excessive sweating, fatigue, fever, sycope/passing out, weight gain, weight loss  · Eyes  o Admits  o : changes in vision  o Denies  o : blurry vision, double vision  · HENT  o Denies  o : loss of hearing, ringing in the ears, ear aches, sore throat, nasal congestion, sinus pain, nose bleeds, seasonal allergies  · Cardiovascular  o Denies  o : blood clots, swollen legs, anemia, easy burising or bleeding, transfusions  · Respiratory  o Admits  o : shortness of breath, COPD  o Denies  o : dry cough, productive cough, pneumonia  · Gastrointestinal  o Denies  o : difficulty swallowing, reflux  · Genitourinary  o Denies  o :  "incontinence  · Neurologic  o Denies  o : headache, seizure, stroke, tremor, loss of balance, falls, dizziness/vertigo, difficulty with sleep, numbness/tingling/paresthesia , difficulty with coordination, difficulty with dexterity, weakness  · Musculoskeletal  o Admits  o : joint pain, weakness  o Denies  o : neck stiffness/pain, swollen lymph nodes, muscle aches, spasms, sciatica, pain radiating in arm, pain radiating in leg, low back pain  · Endocrine  o Denies  o : diabetes, thyroid disorder  · Psychiatric  o Admits  o : anxiety, depression      Vitals  Date Time BP Position Site L\R Cuff Size HR RR TEMP (F) WT  HT  BMI kg/m2 BSA m2 O2 Sat FR L/min FiO2 HC       10/16/2020 01:13 PM        97.1           10/16/2020 04:00 /81 Sitting    81 - R   134lbs 0oz 5'  3\" 23.74 1.64             Physical Examination     She is alert, fluent, phasic follows commands well.  I asked her regarding general knowledge questions.  She can tell me that the name of the president is President Stout.  When asked her about the presidential Challenger she told me it was Miguel and I corrected her to Samaria.  When I asked her who the vice presidential Challenger she states that she remembered it but she had left her mind.  She can tell me that Colin was the president when she was growing up.  I asked her how President Stout compares to Colin.  She told me that President Stout is the worst president.  He is not a good person inside.  She tells me that he told people not to wear a mask in this pandemic.  She is able to carry on a conversation with me.  Her heart is bradycardic at 58.  There is no focal weakness.           Assessment  · Alzheimer's Disease       Alzheimer's disease, unspecified     331.0/G30.9  Dementia in other diseases classified elsewhere without behavioral disturbance     331.0/F02.80  She is taking metoprolol which is probably causing her to bradycardia. I will lower the dose of Aricept to 5 mg. I will see " her again in 8 months time for follow-up. 25 minutes was spent for this moderate complexity visit more than half the time spent face-to-face the patient for examination, counseling, planning and recommendations.      Plan  · Medications  o Medications have been Reconciled  o Transition of Care or Provider Policy  · Instructions  o Encouraged to follow-up with Primary Care Provider for preventative care.            Electronically Signed by: Waqas Sarabia MD -Author on October 16, 2020 04:30:11 PM

## 2021-05-13 NOTE — PROGRESS NOTES
Progress Note      Patient Name: Stacey Juarez   Patient ID: [id number]   Sex: Female   YOB: 1931    Primary Care Provider: Rayshawn Hewitt PA-C   Referring Provider: Rayshawn Hewitt PA-C    Visit Date: November 9, 2020    Provider: Rayshawn Hewitt PA-C   Location: [facility]   Location Address: [address] [place] [address], [address]  [place], [place]  [id number]   Location Phone: [phone]          Chief Complaint  · Follow up      History Of Present Illness  Video Conferencing Visit  Stacey Juarez is a 89 year old /Black female who is presenting for evaluation via video conferencing via Phone Call. Verbal consent obtained before beginning visit.   The following staff were present during this visit: Shade Montano MA/Rayshawn Hewitt PA-C   Stacey Juarez is a 89 year old /Black female who presents for evaluation and treatment of: follow up      Pt presents today for a follow up.    Pt offers no complaints at this time.    Labs-10/22/20    Pt is s/p cardio    She does not smoke, using oxygen 24/7 at home    She is feeling ok overall    PHONE CALL ONLY - 11 MINUTES       Past Medical History  Disease Name Date Onset Notes   Abdominal aortic aneurysm --  --    Abnormal EKG 03/10/2015 --    AMI (acute mesenteric ischemia) 03/10/2015 1986   Arthritis --  --    Atrial fibrillation 12/04/2018 --    Bladder Disorder --  --    Bladder problem --  --    Cataract --  --    Chronic Obstructive Pulmonary Disease --  --    Colon Trouble --  --    COPD (chronic obstructive pulmonary disease) --  --    Coronary artery disease --  --    Dementia --  --    Diverticulitis --  --    Essential hypertension 05/10/2017 --    Heart Attack 1986 --    Hemorrhoids --  --    Hyperlipemia --  --    Hyperlipidemia --  --    Hypertension --  --    Hypertension, Benign Essential --  --    Kidney or Bladder Disease --  --    Limb Swelling --  --    Myocardial Infarction 1986 --    Osteoporosis  06/04/2019 --    Osteoporosis --  --    Oxygen dependent 03/24/2020 --    Paroxysmal atrial fibrillation 08/02/2017 --    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 --  --    Vitamin D deficiency 05/08/2015 --          Past Surgical History  Procedure Name Date Notes   Back --  --    Back surgery --  --    Bladder reconstruction --  --    Breast Surgery 1979 --    Carotid Endarterectomy --  --    Colonoscopy --  --    Eye Implant --  yes   Hysterectomy 1971 --    Joint Surgery --  --    Lumbar Disk Surgery 1986 L4-5         Medication List  Name Date Started Instructions   albuterol sulfate 90 mcg/actuation inhalation HFA aerosol inhaler  inhale 2 puffs (180 mcg) by inhalation route every 4-6 hours as needed   alendronate 70 mg oral tablet 11/09/2020 TAKE 1 TABLET BY MOUTH ONCE A WEEK for 30 days   AMLODIPINE-BENAZ 5/20MG CAPSULES 11/04/2020 TAKE ONE CAPSULE BY MOUTH EVERY DAY   amlodipine-benazepril 5-10 mg oral capsule  take 1 capsule by oral route once daily   atorvastatin 40 mg oral tablet 05/19/2020 TAKE 1 TABLET BY MOUTH EVERY NIGHT AT BEDTIME   budesonide 0.5 mg/2 mL inhalation suspension for nebulization  inhale 2 milliliters (0.5 mg) by nebulization route 2 times per day   donepezil 5 mg oral tablet 10/16/2020 take 1 tablet (5 mg) by oral route once daily in the evening for 30 days   Eliquis 2.5 mg oral tablet 01/04/2018 TAKE 1 TABLET BY MOUTH TWICE DAILY   fluoxetine 10 mg oral capsule 03/16/2020 take 1 capsule (10 mg) by oral route once daily   ipratropium bromide 0.02 % inhalation solution  inhale 1.25 milliliters (250 mcg) via nebulizer by inhalation route 3 times per day   metoprolol succinate 25 mg oral tablet extended release 24 hr 09/02/2020 1/2 TAB PO QD OR AS DIRECTED   spironolactone 25 mg oral tablet 08/19/2020 TAKE 1 TABLET BY MOUTH EVERY DAY   Stiolto Respimat 2.5-2.5 mcg/actuation inhalation mist  inhale 2 puffs by inhalation route once daily at the same time each day         Allergy List  Allergen  Name Date Reaction Notes   PENICILLINS --  --  --          Family Medical History  Disease Name Relative/Age Notes   Heart Disease Mother/   Mother   Family history of certain chronic disabling diseases; arthritis Mother/   Mother   Family history of heart disease Father/   Father         Reproductive History  Menstrual   Age Menarche: 11 Age Menopause: 40   Pregnancy Summary   Total Pregnancies: 1 Full Term: 1 Premature: 0   Ab Induced: 0 Ab Spontaneous: 0 Ectopics: 0   Multiples: 0 Livin         Social History  Finding Status Start/Stop Quantity Notes   Active but no formal exercise --  --/-- --  --    Alcohol Never --/-- --  10/16/2020 - 2020 - 2019 - does not drink  08/10/2018 - 2018 -    Denies substance abuse Never --/-- --  2019 -    Homemaker. --  --/-- --  --    lives alone --  --/-- --  --    No known infection risk --  --/-- --  --    Recreational Drug Use Never --/-- --  2020 - no   Retired --  --/-- --  --    Tobacco Former  1PPD 10/16/2020 - 2020 - 2019 - former smoker, smoked 31 or more years  08/10/2018 - quit 1 year ago smoked 20years    --  --/-- --  --          Immunizations  NameDate Admin Mfg Trade Name Lot Number Route Inj VIS Given VIS Publication   Boqyrvsxt97/04/2018 [facility] FLUZONE-HIGH DOSE [id number]  RA 2018   Comments: tolerated well   Hmyxboppj4142/23/2011 NE PNEUMOVAX 23  NE NE 10/09/2017    Comments:    Prevnar 1308/10/2018 [facility] PREVNAR 13 [id number] IM RD 08/10/2018 2015   Comments: tolerated well   Uusogmcn01/2012 NE Not Entered  NE NE 10/03/2012    Comments:          Review of Systems  · Constitutional  o Denies  o : fever, fatigue, weight loss, weight gain  · Cardiovascular  o Denies  o : lower extremity edema, claudication, chest pressure, palpitations  · Respiratory  o Admits  o : shortness of breath  o Denies  o : wheezing, cough, hemoptysis, dyspnea on exertion  · Gastrointestinal  o Denies  o :  nausea, vomiting, diarrhea, constipation, abdominal pain          Assessment  · COPD (chronic obstructive pulmonary disease)     496/J44.9  · Essential hypertension     401.9/I10  · Hyperlipidemia     272.4/E78.5  · Oxygen dependent     V46.2/Z99.81  · Paroxysmal atrial fibrillation     427.31/I48.0  · Coronary artery disease     414.00/I25.10  · Hypertension     401.9/I10  · Osteoporosis     733.02      Plan  · Orders  o ACO-39: Current medications updated and reviewed (, 1159F) - - 11/09/2020  · Medications  o alendronate 70 mg oral tablet   SIG: TAKE 1 TABLET BY MOUTH ONCE A WEEK for 30 days   DISP: (4) Tablet with 5 refills  Refilled on 11/09/2020     o Medications have been Reconciled  o Transition of Care or Provider Policy  · Instructions  o Patient advised to monitor blood pressure (B/P) at home and journal readings. Patient informed that a B/P reading at home of more than 130/80 is considered hypertension. For readings greater ytpm570/90 or higher patient is advised to follow up in the office with readings for management. Patient advised to limit sodium intake.  o Patient was educated and given low cholesterol diet information.  o Recommended exercise program to assist with cholesterol, weight loss and overall health improvement.  o Advised that cheeses and other sources of dairy fats, animal fats, fast food, and the extras (candy, pastries, pies, doughnuts and cookies) all contain LDL raising nutrients. Advised to increase fruits, vegetables, whole grains, and to monitor portion sizes.   o Take all medications as prescribed/directed.  o Patient was educated/instructed on their diagnosis, treatment and medications prior to discharge from the clinic today.  o Discussed Covid-19 precautions including, but not limited to, social distancing, avoid touching your face, and hand washing.   · Disposition  o Call or Return if symptoms worsen or persist.  o F/U in 4-6 months  o Care  Transition            Electronically Signed by: Rayshawn Hewitt PA-C -Author on November 10, 2020 06:47:17 AM

## 2021-05-14 VITALS
OXYGEN SATURATION: 99 % | HEIGHT: 63 IN | DIASTOLIC BLOOD PRESSURE: 63 MMHG | SYSTOLIC BLOOD PRESSURE: 115 MMHG | BODY MASS INDEX: 23.57 KG/M2 | WEIGHT: 133 LBS | HEART RATE: 104 BPM

## 2021-05-14 VITALS
SYSTOLIC BLOOD PRESSURE: 146 MMHG | DIASTOLIC BLOOD PRESSURE: 82 MMHG | BODY MASS INDEX: 23.92 KG/M2 | HEIGHT: 63 IN | WEIGHT: 135 LBS | HEART RATE: 68 BPM

## 2021-05-14 VITALS
SYSTOLIC BLOOD PRESSURE: 134 MMHG | HEART RATE: 81 BPM | BODY MASS INDEX: 23.74 KG/M2 | DIASTOLIC BLOOD PRESSURE: 81 MMHG | WEIGHT: 134 LBS | HEIGHT: 63 IN

## 2021-05-14 VITALS
WEIGHT: 133 LBS | HEART RATE: 68 BPM | DIASTOLIC BLOOD PRESSURE: 66 MMHG | BODY MASS INDEX: 23.57 KG/M2 | HEIGHT: 63 IN | SYSTOLIC BLOOD PRESSURE: 126 MMHG

## 2021-05-14 VITALS
OXYGEN SATURATION: 97 % | DIASTOLIC BLOOD PRESSURE: 60 MMHG | BODY MASS INDEX: 23.23 KG/M2 | WEIGHT: 131.12 LBS | SYSTOLIC BLOOD PRESSURE: 98 MMHG | HEIGHT: 63 IN | HEART RATE: 57 BPM

## 2021-05-14 VITALS — TEMPERATURE: 97.1 F

## 2021-05-14 NOTE — PROGRESS NOTES
Progress Note      Patient Name: Stacey Juarez   Patient ID: [id number]   Sex: Female   YOB: 1931    Primary Care Provider: Rayshawn Hewitt PA-C   Referring Provider: Rayshawn Hewitt PA-C    Visit Date: April 30, 2021    Provider: HILDA Terrell   Location: [facility]   Location Address: [address] [place] [address], [address]   [place]  [id number]   Location Phone: [phone]          Chief Complaint     Shortness of breath.       History Of Present Illness  REFERRING PROVIDER: Rayshawn Hewitt PA-C   Stacey Juarez is an 89 year old /Black female who continues to complain of shortness of breath. It is mild-to-moderate with mild exertion and long-term for her. She is on oxygen now at 2 liters. She denies any chest pain or pressure. No palpitations, swelling, dizziness, syncope, PND, or orthopnea. She continues to not smoke. She has lost a couple of pounds since her last visit. She has had both COVID vaccines.   PAST MEDICAL HISTORY: Chronic obstructive pulmonary disease; Coronary artery disease with prior myocardial infarction; Hyperlipidemia; Hypertension; Nicotine dependence, Paroxysmal atrial fibrillation.   PSYCHOSOCIAL HISTORY: Previous tobacco use, but quit. Denies alcohol use.   CURRENT MEDICATIONS: Alendronate 70 mg once a week; aspirin 81 mg every other day; spironolactone 25 mg daily; amlodipine-benazepril 5-10 mg daily; metoprolol ER 25 mg 1/2 daily; fluoxetine 10 mg daily; magnesium oxide 400 mg b.i.d.; Eliquis 2.5 mg b.i.d.; potassium 15 mL b.i.d.; donepezil 5 mg q. p.m.; montelukast 10 mg daily; atorvastatin 40 mg q. h.s.; budesonide inhalers; Stiolto.      ALLERGIES:  Penicillin.       Review of Systems  · Cardiovascular  o Admits  o : shortness of breath while walking or lying flat  o Denies  o : palpitations (fast, fluttering, or skipping beats), swelling (feet, ankles, hands), chest pain or angina pectoris   · Respiratory  o Denies  o : chronic or frequent  "cough      Vitals  Date Time BP Position Site L\R Cuff Size HR RR TEMP (F) WT  HT  BMI kg/m2 BSA m2 O2 Sat FR L/min FiO2 HC       04/30/2021 09:51 /66 Sitting    68 - R   133lbs 0oz 5'  3\" 23.56 1.64             Physical Examination  · Constitutional  o Appearance  o : Awake, alert, in no acute distress, very hard-of-hearing, on O2 continuously, accompanied by her daughter.   · Eyes  o Conjunctivae  o : Conjunctivae normal.  · Ears, Nose, Mouth and Throat  o Oral Cavity  o :   § Oral Mucosa  § : Normal.  · Neck  o Jugular Veins  o : No JVD. Good carotid upstroke. No bruits noted.  · Respiratory  o Respiratory  o : Increased AP diameter with diminished breath sounds. Prolonged expiration. Negative rales or rhonchi.   · Cardiovascular  o Heart  o : PMI is normal. S1, S2 normal. No S3. S4+. Negative systolic/diastolic murmur.   o Peripheral Vascular System  o :   § Extremities  § : Negative peripheral edema. Good femoral and pedal pulses.   · Gastrointestinal  o Abdominal Examination  o : Soft. No tenderness or masses felt. No hepatosplenomegaly. Abdominal aorta is not palpable.  · Labs  o Labs  o : Hemoglobin 12.2, hematocrit 40.2. Blood sugar 79. BUN 22, creatinine 1.01. Total cholesterol 147, triglycerides 47, HDL 81, LDL 58.           Assessment     1.  Coronary artery disease with previous MI without angina.  2.  Paroxysmal atrial fibrillation, currently in sinus.  3.  Shortness of breath related to chronic lung disease, stable.  4.  Hypertension, controlled.  5.  Hyperlipidemia, at goal.       Plan     1.  Continue Eliquis in view of atrial arrhythmia.    2.  Continue spironolactone, amlodipine-benazepril, metoprolol, and potassium in view of hypertension.  3.  Continue atorvastatin in view of hyperlipidemia.  4.  Follow up in 8 months with labs or earlier if needed.        HILDA York  JF:francisco             Electronically Signed by: Madison Candelario-, Other -Author on May 4, 2021 " 07:10:16 AM  Electronically Co-signed by: HILDA Terrell -Reviewer on May 5, 2021 09:26:05 AM

## 2021-05-14 NOTE — PROGRESS NOTES
Progress Note      Patient Name: Stacey Juarez   Patient ID: [id number]   Sex: Female   YOB: 1931    Primary Care Provider: Rayshawn Hewitt PA-C   Referring Provider: Rayshawn Hewitt PA-C    Visit Date: December 17, 2020    Provider: Rayshawn Hewitt PA-C   [place] [facility] [place]   Location Address: [address] [place] [address], [address]  [place], [place]  [id number]   Location Phone: [phone]          Chief Complaint  · routine follow up      History Of Present Illness  Stacey Juarez is a 89 year old /Black female who presents for evaluation and treatment of: routine follow up.      pt presents today for routine follow up.    pt daughter states couple weeks ago pt didn't want to do anything such as take any medications, eat or get out of bed; Home Health took a sample to see if pt has UTI and everything was good. The like a flip of switch pt started acting normal again.    Labs 11/20  AWV today    Patient states that she is feeling better.  She just started her Prozac.  Granddaughter states that she is now eating taking her medications.  For period of time patient had refused to do that she states that she was depressed and tired living.  She denies any current suicidal thoughts.    Patient is on oxygen 24/7.       Past Medical History  Disease Name Date Onset Notes   Abdominal aortic aneurysm --  --    Abnormal EKG 03/10/2015 --    AMI (acute mesenteric ischemia) 03/10/2015 1986   Arthritis --  --    Atrial fibrillation 12/04/2018 --    Bladder Disorder --  --    Bladder problem --  --    Cataract --  --    Chronic Obstructive Pulmonary Disease --  --    Colon Trouble --  --    COPD (chronic obstructive pulmonary disease) --  --    Coronary artery disease --  --    Dementia --  --    Diverticulitis --  --    Essential hypertension 05/10/2017 --    Heart Attack 1986 --    Hemorrhoids --  --    Hyperlipemia --  --    Hyperlipidemia --  --    Hypertension --  --    Hypertension,  Benign Essential --  --    Kidney or Bladder Disease --  --    Limb Swelling --  --    Myocardial Infarction 1986 --    Osteoporosis 06/04/2019 --    Osteoporosis --  --    Oxygen dependent 03/24/2020 --    Paroxysmal atrial fibrillation 08/02/2017 --    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 --  --    Vitamin D deficiency 05/08/2015 --          Past Surgical History  Procedure Name Date Notes   Back --  --    Back surgery --  --    Bladder reconstruction --  --    Breast Surgery 1979 --    Carotid Endarterectomy --  --    Colonoscopy --  --    Eye Implant --  yes   Hysterectomy 1971 --    Joint Surgery --  --    Lumbar Disk Surgery 1986 L4-5         Medication List  Name Date Started Instructions   albuterol sulfate 90 mcg/actuation inhalation HFA aerosol inhaler  inhale 2 puffs (180 mcg) by inhalation route every 4-6 hours as needed   alendronate 70 mg oral tablet 11/09/2020 TAKE 1 TABLET BY MOUTH ONCE A WEEK for 30 days   amlodipine-benazepril 2.5-10 mg oral capsule 12/17/2020 take 1 capsule by oral route once daily for 30 days   atorvastatin 40 mg oral tablet 05/19/2020 TAKE 1 TABLET BY MOUTH EVERY NIGHT AT BEDTIME   budesonide 0.5 mg/2 mL inhalation suspension for nebulization  inhale 2 milliliters (0.5 mg) by nebulization route 2 times per day   donepezil 5 mg oral tablet 10/16/2020 take 1 tablet (5 mg) by oral route once daily in the evening for 30 days   Eliquis 2.5 mg oral tablet 01/04/2018 TAKE 1 TABLET BY MOUTH TWICE DAILY   fluoxetine 10 mg oral capsule 03/16/2020 take 1 capsule (10 mg) by oral route once daily   ipratropium bromide 0.02 % inhalation solution  inhale 1.25 milliliters (250 mcg) via nebulizer by inhalation route 3 times per day   metoprolol succinate 25 mg oral tablet extended release 24 hr 09/02/2020 1/2 TAB PO QD OR AS DIRECTED   spironolactone 25 mg oral tablet 08/19/2020 TAKE 1 TABLET BY MOUTH EVERY DAY   Stiolto Respimat 2.5-2.5 mcg/actuation inhalation mist  inhale 2 puffs by inhalation  route once daily at the same time each day         Allergy List  Allergen Name Date Reaction Notes   PENICILLINS --  --  --          Family Medical History  Disease Name Relative/Age Notes   Heart Disease Mother/   Mother   Family history of certain chronic disabling diseases; arthritis Mother/   Mother   Family history of heart disease Father/   Father         Reproductive History  Menstrual   Age Menarche: 11 Age Menopause: 40   Pregnancy Summary   Total Pregnancies: 1 Full Term: 1 Premature: 0   Ab Induced: 0 Ab Spontaneous: 0 Ectopics: 0   Multiples: 0 Livin         Social History  Finding Status Start/Stop Quantity Notes   Active but no formal exercise --  --/-- --  --    Alcohol Never --/-- --  10/16/2020 - 2020 - 2019 - does not drink  08/10/2018 - 2018 -    Denies substance abuse Never --/-- --  2019 -    Homemaker. --  --/-- --  --    lives alone --  --/-- --  --    No known infection risk --  --/-- --  --    Recreational Drug Use Never --/-- --  2020 - no   Retired --  --/-- --  --    Tobacco Former  1PPD 10/16/2020 - 2020 - 2019 - former smoker, smoked 31 or more years  08/10/2018 - quit 1 year ago smoked 20years    --  --/-- --  --          Immunizations  NameDate Admin Mfg Trade Name Lot Number Route Inj VIS Given VIS Publication   Sifnadeyf44/17/2020 [facility] FLUZONE-HIGH DOSE [id number] IM LD 2020    Comments: pt tolerated well   Bqomyrrgv3183/23/2011 NE PNEUMOVAX 23  NE NE 10/09/2017    Comments:    Prevnar 1308/10/2018 [facility] PREVNAR 13 [id number] IM RD 08/10/2018 2015   Comments: tolerated well   Ukfnvuhg18/2012 NE Not Entered  NE NE 10/03/2012    Comments:          Review of Systems  · Constitutional  o Denies  o : fever, fatigue, weight loss, weight gain  · Cardiovascular  o Denies  o : lower extremity edema, claudication, chest pressure, palpitations  · Respiratory  o Denies  o : shortness of breath, wheezing, cough, hemoptysis, dyspnea  "on exertion  · Gastrointestinal  o Denies  o : nausea, vomiting, diarrhea, constipation, abdominal pain      Vitals  Date Time BP Position Site L\R Cuff Size HR RR TEMP (F) WT  HT  BMI kg/m2 BSA m2 O2 Sat FR L/min FiO2        12/17/2020 09:35 AM 98/60 Sitting    57 - R   131lbs 2oz 5'  3\" 23.23 1.63 97 %            Physical Examination  · Constitutional  o Appearance  o : thin, well developed  · Head and Face  o Head  o : normocephalic, atraumatic  · Neck  o Inspection/Palpation  o : normal appearance, no masses or tenderness, trachea midline  o Thyroid  o : gland size normal, nontender, no nodules or masses present on palpation  · Respiratory  o Respiratory Effort  o : breathing unlabored  o Inspection of Chest  o : chest rise symmetric bilaterally  o Auscultation of Lungs  o : diminished breath sounds present    · Cardiovascular  o Heart  o :   § Auscultation of Heart  § : regular rate and rhythm, no murmurs, gallops or rubs  o Peripheral Vascular System  o :   § Extremities  § : no edema  · Lymphatic  o Neck  o : no cervical lymphadenopathy, no supraclavicular lymphadenopathy  · Psychiatric  o Mood and Affect  o : mood normal, affect appropriate     2L NC Oxygen           Assessment  · COPD (chronic obstructive pulmonary disease)     496/J44.9  · Hyperlipidemia     272.4/E78.5  · Osteoporosis     733.00/M81.0  · Vitamin D deficiency     268.9/E55.9  · Screening for depression     V79.0/Z13.89  · Need for influenza vaccination     V04.81/Z23  · Oxygen dependent     V46.2/Z99.81  · Paroxysmal atrial fibrillation     427.31/I48.0  · Coronary artery disease     414.00/I25.10  · Osteoporosis     733.02  · Legally blind     369.4/H54.8  · Respiratory failure     518.81/J96.90      Plan  · Orders  o Immunization Admin Fee (Single) ([facility]) (90405) - V04.81/Z23 - 12/17/2020  o Fluzone High Dose Flu Vaccine (36068) - V04.81/Z23 - 12/17/2020   Vaccine - Influenza; Dose: 0.5; Site: Left Deltoid; Route: Intramuscular; Date: " 12/17/2020 10:09:00; Exp: 06/30/2021; Lot: [id number]; Mfg: sanofi pasteur; TradeName: FLUZONE-HIGH DOSE; Administered By: Shade Montano MA; Comment: pt tolerated well  o ACO-39: Current medications updated and reviewed (, 1159F) - - 12/17/2020  · Medications  o amlodipine-benazepril 2.5-10 mg oral capsule   SIG: take 1 capsule by oral route once daily for 30 days   DISP: (30) Capsule with 2 refills  Adjusted on 12/17/2020     o Medications have been Reconciled  o Transition of Care or Provider Policy  · Instructions  o Patient was educated and given low cholesterol diet information.  o Recommended exercise program to assist with cholesterol, weight loss and overall health improvement.  o Advised that cheeses and other sources of dairy fats, animal fats, fast food, and the extras (candy, pastries, pies, doughnuts and cookies) all contain LDL raising nutrients. Advised to increase fruits, vegetables, whole grains, and to monitor portion sizes.   o Depression Screen completed and scanned into the EMR under the designated folder within the patient's documents.  o Today's PHQ-9 result is _4__  o Take all medications as prescribed/directed.  o Patient was educated/instructed on their diagnosis, treatment and medications prior to discharge from the clinic today.  o Discussed Covid-19 precautions including, but not limited to, social distancing, avoid touching your face, and hand washing.   · Disposition  o Call or Return if symptoms worsen or persist.  o F/U in 3 months.  o Care Transition            Electronically Signed by: Rayshawn Hewitt PA-C -Author on December 17, 2020 01:14:43 PM

## 2021-05-14 NOTE — PROGRESS NOTES
Progress Note      Patient Name: Stacey Juarez   Patient ID: [id number]   Sex: Female   YOB: 1931    Primary Care Provider: Rayshawn Hewitt PA-C   Referring Provider: Rayshawn Hewitt PA-C    Visit Date: December 17, 2020    Provider: Rayshawn Hewitt PA-C   Location: [facility]   Location Address: [address] [place] [address], [address]  [place], [place]  [id number]   Location Phone: [phone]          Chief Complaint  · Annual Wellness Exam      History Of Present Illness  The patient is a 89 year old /Black female who has come to this office for her Annual Wellness Visit.   Her Primary Care Provider is Rayshawn Hewitt PA-C. Her comprehensive Care Team list, including suppliers, has been updated on the Facesheet. Her medical/family history, height, weight, BMI, and blood pressure have been reviewed and are in the chart. The Health Risk Assessment has been completed and scanned in the chart.   Medications are listed in the medication list.   The active problem list includes: Abdominal aortic aneurysm, Abnormal EKG, AMI (acute mesenteric ischemia), Arthritis, Atrial fibrillation, Cataract, Colon Trouble, COPD (chronic obstructive pulmonary disease), Coronary artery disease, Diverticulitis, Essential hypertension, Hemorrhoids, Hyperlipidemia, Hypertension, Kidney or Bladder Disease, Myocardial Infarction, Osteoporosis, Osteoporosis, Oxygen dependent, Paroxysmal atrial fibrillation, and Vitamin D deficiency   The patient does not have a history of substance use.   Patient reports her diet is adequate.   The Mini-Cog has been administered and is scanned in chart. The results are negative. Her cognitive function is without limitation.   A hearing loss screen was completed today and the result is negative.   Patient does not have any risk factors for depression. Patient completed the PHQ-9 today and it has been scanned in the chart. The total score is 1-4.   The Timed Up and Go screen was  administered today and the result is negative.   The Soto Index of [place] in ADLs indicated full function (score of 6).   A Falls Risk Assessment has been completed, including a review of home fall hazards and medication review.   Overall, the patient's functional ability is noted by this provider to be within normal limits. Her level of safety is noted to be within normal limits. Her balance/gait is within normal limits. There have been no falls in the past year. Patient-specific home safety recommendations have been reviewed and a copy has been given to patient.   She denies issues with leaking urine.   There are no additional risk factors identified.   Living Will/Advanced Directive has not previously been completed.   Personalized health advice was given to the patient and a written health screening schedule was established; see Plan for details.   Stacey Juarez is a 89 year old /Black female who presents for evaluation and treatment of:       Past Medical History  Disease Name Date Onset Notes   Abdominal aortic aneurysm --  --    Abnormal EKG 03/10/2015 --    AMI (acute mesenteric ischemia) 03/10/2015 1986   Arthritis --  --    Atrial fibrillation 12/04/2018 --    Bladder Disorder --  --    Bladder problem --  --    Cataract --  --    Chronic Obstructive Pulmonary Disease --  --    Colon Trouble --  --    COPD (chronic obstructive pulmonary disease) --  --    Coronary artery disease --  --    Dementia --  --    Diverticulitis --  --    Essential hypertension 05/10/2017 --    Heart Attack 1986 --    Hemorrhoids --  --    Hyperlipemia --  --    Hyperlipidemia --  --    Hypertension --  --    Hypertension, Benign Essential --  --    Kidney or Bladder Disease --  --    Limb Swelling --  --    Myocardial Infarction 1986 --    Osteoporosis 06/04/2019 --    Osteoporosis --  --    Oxygen dependent 03/24/2020 --    Paroxysmal atrial fibrillation 08/02/2017 --    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  --  --    Vitamin D deficiency 05/08/2015 --          Past Surgical History  Procedure Name Date Notes   Back --  --    Back surgery --  --    Bladder reconstruction --  --    Breast Surgery 1979 --    Carotid Endarterectomy --  --    Colonoscopy --  --    Eye Implant --  yes   Hysterectomy 1971 --    Joint Surgery --  --    Lumbar Disk Surgery 1986 L4-5         Medication List  Name Date Started Instructions   albuterol sulfate 90 mcg/actuation inhalation HFA aerosol inhaler  inhale 2 puffs (180 mcg) by inhalation route every 4-6 hours as needed   alendronate 70 mg oral tablet 11/09/2020 TAKE 1 TABLET BY MOUTH ONCE A WEEK for 30 days   amlodipine-benazepril 2.5-10 mg oral capsule 12/17/2020 take 1 capsule by oral route once daily for 30 days   atorvastatin 40 mg oral tablet 05/19/2020 TAKE 1 TABLET BY MOUTH EVERY NIGHT AT BEDTIME   budesonide 0.5 mg/2 mL inhalation suspension for nebulization  inhale 2 milliliters (0.5 mg) by nebulization route 2 times per day   donepezil 5 mg oral tablet 10/16/2020 take 1 tablet (5 mg) by oral route once daily in the evening for 30 days   Eliquis 2.5 mg oral tablet 01/04/2018 TAKE 1 TABLET BY MOUTH TWICE DAILY   fluoxetine 10 mg oral capsule 03/16/2020 take 1 capsule (10 mg) by oral route once daily   ipratropium bromide 0.02 % inhalation solution  inhale 1.25 milliliters (250 mcg) via nebulizer by inhalation route 3 times per day   metoprolol succinate 25 mg oral tablet extended release 24 hr 09/02/2020 1/2 TAB PO QD OR AS DIRECTED   spironolactone 25 mg oral tablet 08/19/2020 TAKE 1 TABLET BY MOUTH EVERY DAY   Stiolto Respimat 2.5-2.5 mcg/actuation inhalation mist  inhale 2 puffs by inhalation route once daily at the same time each day         Allergy List  Allergen Name Date Reaction Notes   PENICILLINS --  --  --          Family Medical History  Disease Name Relative/Age Notes   Heart Disease Mother/   Mother   Family history of certain chronic disabling diseases; arthritis  "Mother/   Mother   Family history of heart disease Father/   Father         Reproductive History  Menstrual   Age Menarche: 11 Age Menopause: 40   Pregnancy Summary   Total Pregnancies: 1 Full Term: 1 Premature: 0   Ab Induced: 0 Ab Spontaneous: 0 Ectopics: 0   Multiples: 0 Livin         Social History  Finding Status Start/Stop Quantity Notes   Active but no formal exercise --  --/-- --  --    Alcohol Never --/-- --  10/16/2020 - 2020 - 2019 - does not drink  08/10/2018 - 2018 -    Denies substance abuse Never --/-- --  2019 -    Homemaker. --  --/-- --  --    lives alone --  --/-- --  --    No known infection risk --  --/-- --  --    Recreational Drug Use Never --/-- --  2020 - no   Retired --  --/-- --  --    Tobacco Former  1PPD 10/16/2020 - 2020 - 2019 - former smoker, smoked 31 or more years  08/10/2018 - quit 1 year ago smoked 20years    --  --/-- --  --          Immunizations  NameDate Admin Mfg Trade Name Lot Number Route Inj VIS Given VIS Publication   Mmqxebsmo36/17/2020 [facility] FLUZONE-HIGH DOSE [id number] IM LD 2020    Comments: pt tolerated well   [id number]/23/2011 [facility] PNEUMOVAX 23  NE NE 10/09/2017    Comments:    Prevnar 1308/10/2018 [facility] PREVNAR 13 [id number] IM RD 08/10/2018 2015   Comments: tolerated well   Ypgvplmx24/2012 NE Not Entered  NE NE 10/03/2012    Comments:          Vitals  Date Time BP Position Site L\R Cuff Size HR RR TEMP (F) WT  HT  BMI kg/m2 BSA m2 O2 Sat FR L/min FiO2 HC       2020 09:35 AM 98/60 Sitting    57 - R   131lbs 2oz 5'  3\" 23.23 1.63 97 %            Physical Examination  · Head and Face  o Head  o : normocephalic, atraumatic  · Ears, Nose, Mouth and Throat  o Ears  o :   § External Ears  § : external auditory canal appearance normal, no discharge present  § Otoscopic Examination  § : tympanic membranes pearly white/gray bilaterally  o Nose  o :   § External Nose  § : no lesions " noted  § Nasopharynx  § : no discharge present  o Oral Cavity  o :   § Oral Mucosa  § : oral mucosa light pink  o Throat  o :   § Oropharynx  § : tonsils without exudate, no palatal petechiae  · Neck  o Inspection/Palpation  o : normal appearance, no masses or tenderness, trachea midline  o Thyroid  o : gland size normal, nontender, no nodules or masses present on palpation  · Respiratory  o Respiratory Effort  o : breathing unlabored  o Inspection of Chest  o : chest rise symmetric bilaterally  o Auscultation of Lungs  o : clear to auscultation bilaterally throughout inspiration and expiration  · Cardiovascular  o Heart  o :   § Auscultation of Heart  § : regular rate and rhythm, no murmurs, gallops or rubs  o Peripheral Vascular System  o :   § Extremities  § : no edema  · Lymphatic  o Neck  o : no cervical lymphadenopathy, no supraclavicular lymphadenopathy  · Psychiatric  o Mood and Affect  o : mood normal, affect appropriate          Assessment  · Encounter for Medicare annual wellness exam     V70.0/Z00.00  · Screening for depression     V79.0/Z13.89  · Screening for alcoholism     V79.1/Z13.39  · Advanced care planning/counseling discussion     V65.49/Z71.89      Plan  · Orders  o Falls Risk Assessment Completed (3288F) - V70.0/Z00.00 - 12/17/2020  o Brief hearing screening (written) [facility] () - V70.0/Z00.00 - 12/17/2020  o Annual Wellness Visit-includes a Personalized Prevention Plan of Service (PPS), SUBSEQUENT VISIT (Medicare) () - V70.0/Z00.00 - 12/17/2020  o Presence or absence of urinary incontinence assessed (ZHOU) (1090F) - V70.0/Z00.00 - 12/17/2020  o Negative alcohol screening () - V79.1/Z13.39 - 12/17/2020  o ACO-39: Current medications updated and reviewed (1159F, ) - - 12/17/2020  o ACO-20: Screening Mammography documented and reviewed () - - 12/17/2020  o ACO-14: Influenza immunization administered or previously received [facility] () - - 12/17/2020  o ACO-15: Pneumococcal  Vaccine Administered or Previously Received (4040F) - - 12/17/2020  o ACO-18: Negative screen for clinical depression using a standardized tool () - - 12/17/2020  o ACO-13: Fall Risk Screening with no falls in past year or only one fall without injury in the past year (1101F) - - 12/17/2020  o ACO - Pt declines to or was not able to provide an Advance Care Plan or name a Surrogate Decision Maker (1124F) - - 12/17/2020  · Medications  o Medications have been Reconciled  o Transition of Care or Provider Policy  · Instructions  o Health Risk Assessment has been reviewed with the patient.  o Written health screening schedule for next 5-10 years was established with patient; information scanned in chart and given/mailed to patient.  o Fall prevention methods discussed and a copy of recommendations given/mailed to patient.  o Today's PHQ-9 score is: 4___  o Depression Screen completed and scanned into the EMR under the designated folder within the patient's documents.  o Patient was educated/instructed on their diagnosis, treatment and medications prior to discharge from the clinic today.            Electronically Signed by: Rayhsawn Hewitt PA-C -Author on December 17, 2020 01:10:02 PM

## 2021-05-14 NOTE — PROGRESS NOTES
Progress Note      Patient Name: Stacey Juarez   Patient ID: [id number]   Sex: Female   YOB: 1931    Primary Care Provider: Rayshawn Hewitt PA-C   Referring Provider: Rayshawn Hewitt PA-C    Visit Date: April 19, 2021    Provider: Rayshawn Hewitt PA-C   Location: [facility]   Location Address: [facility], [address]  [place], [place]  [id number]   Location Phone: [phone]          Chief Complaint  · 4 month follow up      History Of Present Illness  Stacey Juarez is a 89 year old /Black female who presents for evaluation and treatment of: 4 month follow up      Pt presents today for a 4 month follow up.     Pt offers no complaints at this time, states she is doing well. Pt is on 2L O2 24/7.     Pt has received both COVID-19 vaccines, Moderna.     Labs-11/2020    NIDDM - good control    Macular Degen - seeing retinal specialists    HTN - well controlled on Lotrel, metoprolol, spironolactone    Memory issues - stable - donepezil  Hypoxia - well controlled with oxygen 2L NC 24/7    No CP, SOA, HA    Pt is doing well overall       Past Medical History  Disease Name Date Onset Notes   Abdominal aortic aneurysm --  --    Abnormal EKG 03/10/2015 --    AMI (acute mesenteric ischemia) 03/10/2015 1986   Arthritis --  --    Atrial fibrillation 12/04/2018 --    Bladder disorder --  --    Bladder problem --  --    Cataract --  --    Chronic Obstructive Pulmonary Disease --  --    Colon Trouble --  --    COPD (chronic obstructive pulmonary disease) --  --    Coronary artery disease --  --    Dementia --  --    Diverticulitis --  --    Essential hypertension 05/10/2017 --    Heart Attack 1986 --    Hemorrhoids --  --    Hyperlipemia --  --    Hyperlipidemia --  --    Hypertension --  --    Hypertension, Benign Essential --  --    Kidney or Bladder Disease --  --    Limb Swelling --  --    Myocardial Infarction 1986 --    Osteoporosis 06/04/2019 --    Osteoporosis --  --     Oxygen dependent 03/24/2020 --    Paroxysmal atrial fibrillation 08/02/2017 --    Respiratory failure 12/17/2020 --    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 --  --    Vitamin D deficiency 05/08/2015 --          Past Surgical History  Procedure Name Date Notes   Back --  --    Back surgery --  --    Bladder reconstruction --  --    Breast Surgery 1979 --    Carotid Endarterectomy --  --    Colonoscopy --  --    Eye Implant --  yes   Hysterectomy 1971 --    Joint Surgery --  --    Lumbar Disk Surgery 1986 L4-5         Medication List  Name Date Started Instructions   albuterol sulfate 90 mcg/actuation inhalation HFA aerosol inhaler  inhale 2 puffs (180 mcg) by inhalation route every 4-6 hours as needed   alendronate 70 mg oral tablet 11/09/2020 TAKE 1 TABLET BY MOUTH ONCE A WEEK for 30 days   amlodipine-benazepril 2.5-10 mg oral capsule 12/17/2020 take 1 capsule by oral route once daily for 30 days   atorvastatin 40 mg oral tablet 05/19/2020 TAKE 1 TABLET BY MOUTH EVERY NIGHT AT BEDTIME   budesonide 0.5 mg/2 mL inhalation suspension for nebulization  inhale 2 milliliters (0.5 mg) by nebulization route 2 times per day   donepezil 5 mg oral tablet 10/16/2020 take 1 tablet (5 mg) by oral route once daily in the evening for 30 days   Eliquis 2.5 mg oral tablet 01/04/2018 TAKE 1 TABLET BY MOUTH TWICE DAILY   fluoxetine 10 mg oral capsule 01/08/2021 take 1 capsule (10 mg) by oral route once daily for 30 days   ipratropium bromide 0.02 % inhalation solution  inhale 1.25 milliliters (250 mcg) via nebulizer by inhalation route 3 times per day   metoprolol succinate 25 mg oral tablet extended release 24 hr 09/02/2020 1/2 TAB PO QD OR AS DIRECTED   POTASSIUM CHLOR 10% LIQ(20MEQ/15ML) 03/24/2021 TAKE 15 ML BY MOUTH TWICE DAILY   spironolactone 25 mg oral tablet 08/19/2020 TAKE 1 TABLET BY MOUTH EVERY DAY   Stiolto Respimat 2.5-2.5 mcg/actuation inhalation mist  inhale 2 puffs by inhalation route once daily at the same time each day          Allergy List  Allergen Name Date Reaction Notes   PENICILLINS --  --  --        Allergies Reconciled  Family Medical History  Disease Name Relative/Age Notes   Heart Disease Mother/   Mother   Family history of certain chronic disabling diseases; arthritis Mother/   Mother   Family history of heart disease Father/   Father         Reproductive History  Menstrual   Age Menarche: 11 Age Menopause: 40   Pregnancy Summary   Total Pregnancies: 1 Full Term: 1 Premature: 0   Ab Induced: 0 Ab Spontaneous: 0 Ectopics: 0   Multiples: 0 Livin         Social History  Finding Status Start/Stop Quantity Notes   Active but no formal exercise --  --/-- --  --    Alcohol Never --/-- --  10/16/2020 - 2020 - 2019 - does not drink  08/10/2018 - 2018 -    Denies substance abuse Never --/-- --  2019 -    Homemaker. --  --/-- --  --    lives alone --  --/-- --  --    No known infection risk --  --/-- --  --    Recreational Drug Use Never --/-- --  2020 - no   Retired --  --/-- --  --    Tobacco Former  1PPD 10/16/2020 - 2020 - 2019 - former smoker, smoked 31 or more years  08/10/2018 - quit 1 year ago smoked 20years    --  --/-- --  --          Immunizations  NameDate Admin Mfg Trade Name Lot Number Route Inj VIS Given VIS Publication   Yhfxcqfwh68/17/2020 [facility] FLUZONE-HIGH DOSE [id number] IM LD 2020    Comments: pt tolerated well   Creijxbvd9383/23/2011 [facility] PNEUMOVAX 23  NE NE 10/09/2017    Comments:    Prevnar 1308/10/2018 [facility] PREVNAR 13 [id number] IM RD 08/10/2018 2015   Comments: tolerated well   Ungxyhzv50/2012 NE Not Entered  NE NE 10/03/2012    Comments:          Review of Systems  · Constitutional  o Denies  o : fever, fatigue, weight loss, weight gain  · Cardiovascular  o Denies  o : lower extremity edema, claudication, chest pressure, palpitations  · Respiratory  o Denies  o : shortness of breath, wheezing, cough, hemoptysis, dyspnea on  "exertion  · Gastrointestinal  o Denies  o : nausea, vomiting, diarrhea, constipation, abdominal pain      Vitals  Date Time BP Position Site L\R Cuff Size HR RR TEMP (F) WT  HT  BMI kg/m2 BSA m2 O2 Sat FR L/min FiO2 HC       04/19/2021 10:03 /63 Sitting    104 - R   133lbs 0oz 5'  3\" 23.56 1.64 99 %            Physical Examination  · Constitutional  o Appearance  o : well developed, well-nourished, no acute distress  · Ears, Nose, Mouth and Throat  o Ears  o :   § External Ears  § : external auditory canal appearance normal, no discharge present  § Otoscopic Examination  § : tympanic membranes pearly white/gray bilaterally  o Nose  o :   § External Nose  § : no lesions noted  § Nasopharynx  § : no discharge present  o Oral Cavity  o :   § Oral Mucosa  § : oral mucosa light pink  o Throat  o :   § Oropharynx  § : tonsils without exudate, no palatal petechiae  · Neck  o Inspection/Palpation  o : normal appearance, no masses or tenderness, trachea midline  o Thyroid  o : gland size normal, nontender, no nodules or masses present on palpation  · Respiratory  o Respiratory Effort  o : breathing unlabored  o Inspection of Chest  o : chest rise symmetric bilaterally  o Auscultation of Lungs  o : diminished breath sounds present    · Cardiovascular  o Heart  o :   § Auscultation of Heart  § : regular rate, normal rhythm, murmur present   o Peripheral Vascular System  o :   § Extremities  § : mild lower extremity edema present, no cyanosis, no distal hair loss, normal capillary refill  · Lymphatic  o Neck  o : no cervical lymphadenopathy, no supraclavicular lymphadenopathy  · Psychiatric  o Mood and Affect  o : mood normal, affect appropriate     Oxygen via nasal canula 2 L 24/7 - doing well           Assessment  · COPD (chronic obstructive pulmonary disease)     496/J44.9  · Essential hypertension     401.9/I10  · Hyperlipidemia     272.4/E78.5  · Vitamin D deficiency     268.9/E55.9  · Atrial " fibrillation     427.31/I48.91  · Oxygen dependent     V46.2/Z99.81  · Respiratory failure     518.81/J96.90  · Arthritis     V13.4/V13.4  · Coronary artery disease     414.00/I25.10  · Osteoporosis     733.02      Plan  · Orders  o Physical, Primary Care Panel (CBC, CMP, Lipid, TSH) [facility] (79708, [id number], [id number], 89514) - - 04/19/2021  o Urinalysis with Reflex Microscopy ([facility]) (25241) - - 04/19/2021  o ACO-39: Current medications updated and reviewed (, 1159F) - - 04/19/2021  · Medications  o Medications have been Reconciled  o Transition of Care or Provider Policy  · Instructions  o Patient advised to monitor blood pressure (B/P) at home and journal readings. Patient informed that a B/P reading at home of more than 130/80 is considered hypertension. For readings greater plue555/90 or higher patient is advised to follow up in the office with readings for management. Patient advised to limit sodium intake.  o Patient was educated and given low cholesterol diet information.  o Recommended exercise program to assist with cholesterol, weight loss and overall health improvement.  o Advised that cheeses and other sources of dairy fats, animal fats, fast food, and the extras (candy, pastries, pies, doughnuts and cookies) all contain LDL raising nutrients. Advised to increase fruits, vegetables, whole grains, and to monitor portion sizes.   o Take all medications as prescribed/directed.  o Patient instructed/educated on their diet and exercise program.  o Patient was educated/instructed on their diagnosis, treatment and medications prior to discharge from the clinic today.  o Discussed Covid-19 precautions including, but not limited to, social distancing, avoid touching your face, and hand washing.   · Disposition  o Call or Return if symptoms worsen or persist.  o F/U in 4-6 months  o Care Transition            Electronically Signed by: Rayshawn Hewitt PA-C -Author on April 19, 2021 01:50:02 PM

## 2021-05-15 VITALS
OXYGEN SATURATION: 77 % | HEART RATE: 90 BPM | SYSTOLIC BLOOD PRESSURE: 144 MMHG | HEIGHT: 63 IN | DIASTOLIC BLOOD PRESSURE: 72 MMHG | WEIGHT: 133 LBS | BODY MASS INDEX: 23.57 KG/M2

## 2021-05-15 VITALS
HEIGHT: 63 IN | BODY MASS INDEX: 24.45 KG/M2 | HEART RATE: 70 BPM | WEIGHT: 138 LBS | SYSTOLIC BLOOD PRESSURE: 136 MMHG | OXYGEN SATURATION: 96 % | DIASTOLIC BLOOD PRESSURE: 77 MMHG

## 2021-05-15 VITALS
HEART RATE: 70 BPM | OXYGEN SATURATION: 99 % | DIASTOLIC BLOOD PRESSURE: 82 MMHG | HEIGHT: 63 IN | WEIGHT: 142.12 LBS | BODY MASS INDEX: 25.18 KG/M2 | SYSTOLIC BLOOD PRESSURE: 148 MMHG

## 2021-05-15 VITALS
DIASTOLIC BLOOD PRESSURE: 64 MMHG | OXYGEN SATURATION: 98 % | WEIGHT: 139.25 LBS | SYSTOLIC BLOOD PRESSURE: 124 MMHG | BODY MASS INDEX: 24.67 KG/M2 | HEART RATE: 65 BPM | HEIGHT: 63 IN

## 2021-05-15 VITALS
DIASTOLIC BLOOD PRESSURE: 65 MMHG | WEIGHT: 139 LBS | BODY MASS INDEX: 24.63 KG/M2 | HEART RATE: 82 BPM | SYSTOLIC BLOOD PRESSURE: 146 MMHG | HEIGHT: 63 IN

## 2021-05-15 VITALS
WEIGHT: 141 LBS | DIASTOLIC BLOOD PRESSURE: 90 MMHG | BODY MASS INDEX: 24.98 KG/M2 | SYSTOLIC BLOOD PRESSURE: 153 MMHG | HEIGHT: 63 IN | HEART RATE: 101 BPM

## 2021-05-15 VITALS
BODY MASS INDEX: 24.8 KG/M2 | DIASTOLIC BLOOD PRESSURE: 82 MMHG | WEIGHT: 140 LBS | HEIGHT: 63 IN | SYSTOLIC BLOOD PRESSURE: 138 MMHG | HEART RATE: 60 BPM

## 2021-05-15 VITALS
DIASTOLIC BLOOD PRESSURE: 76 MMHG | WEIGHT: 139 LBS | BODY MASS INDEX: 24.63 KG/M2 | HEIGHT: 63 IN | HEART RATE: 70 BPM | SYSTOLIC BLOOD PRESSURE: 126 MMHG

## 2021-05-16 VITALS
BODY MASS INDEX: 24.36 KG/M2 | SYSTOLIC BLOOD PRESSURE: 125 MMHG | HEART RATE: 58 BPM | DIASTOLIC BLOOD PRESSURE: 73 MMHG | OXYGEN SATURATION: 93 % | HEIGHT: 62 IN | WEIGHT: 132.37 LBS

## 2021-05-16 VITALS
DIASTOLIC BLOOD PRESSURE: 70 MMHG | OXYGEN SATURATION: 93 % | RESPIRATION RATE: 16 BRPM | SYSTOLIC BLOOD PRESSURE: 115 MMHG | WEIGHT: 135 LBS | BODY MASS INDEX: 24.84 KG/M2 | HEART RATE: 64 BPM | HEIGHT: 62 IN

## 2021-05-16 VITALS
DIASTOLIC BLOOD PRESSURE: 70 MMHG | WEIGHT: 132 LBS | BODY MASS INDEX: 23.39 KG/M2 | HEIGHT: 63 IN | HEART RATE: 56 BPM | SYSTOLIC BLOOD PRESSURE: 130 MMHG

## 2021-05-16 VITALS
BODY MASS INDEX: 24.1 KG/M2 | HEART RATE: 58 BPM | SYSTOLIC BLOOD PRESSURE: 146 MMHG | WEIGHT: 136 LBS | DIASTOLIC BLOOD PRESSURE: 82 MMHG | HEIGHT: 63 IN

## 2021-05-16 VITALS
BODY MASS INDEX: 24.27 KG/M2 | DIASTOLIC BLOOD PRESSURE: 64 MMHG | HEART RATE: 76 BPM | WEIGHT: 137 LBS | SYSTOLIC BLOOD PRESSURE: 102 MMHG | OXYGEN SATURATION: 98 % | HEIGHT: 63 IN

## 2021-05-28 VITALS
SYSTOLIC BLOOD PRESSURE: 154 MMHG | RESPIRATION RATE: 16 BRPM | TEMPERATURE: 97.8 F | SYSTOLIC BLOOD PRESSURE: 118 MMHG | HEIGHT: 62 IN | BODY MASS INDEX: 25.8 KG/M2 | HEIGHT: 62 IN | RESPIRATION RATE: 16 BRPM | TEMPERATURE: 98.4 F | SYSTOLIC BLOOD PRESSURE: 106 MMHG | BODY MASS INDEX: 24.7 KG/M2 | TEMPERATURE: 98.1 F | HEIGHT: 62 IN | OXYGEN SATURATION: 93 % | DIASTOLIC BLOOD PRESSURE: 60 MMHG | WEIGHT: 140.19 LBS | RESPIRATION RATE: 18 BRPM | HEIGHT: 62 IN | HEART RATE: 61 BPM | OXYGEN SATURATION: 93 % | SYSTOLIC BLOOD PRESSURE: 134 MMHG | BODY MASS INDEX: 24.74 KG/M2 | WEIGHT: 134.44 LBS | HEART RATE: 91 BPM | BODY MASS INDEX: 23.97 KG/M2 | HEART RATE: 62 BPM | HEART RATE: 64 BPM | TEMPERATURE: 98.2 F | DIASTOLIC BLOOD PRESSURE: 72 MMHG | OXYGEN SATURATION: 90 % | OXYGEN SATURATION: 92 % | DIASTOLIC BLOOD PRESSURE: 66 MMHG | WEIGHT: 130.25 LBS | DIASTOLIC BLOOD PRESSURE: 60 MMHG | WEIGHT: 134.25 LBS | RESPIRATION RATE: 16 BRPM

## 2021-05-28 VITALS
RESPIRATION RATE: 13 BRPM | WEIGHT: 140 LBS | BODY MASS INDEX: 25.76 KG/M2 | TEMPERATURE: 98.1 F | SYSTOLIC BLOOD PRESSURE: 114 MMHG | RESPIRATION RATE: 14 BRPM | HEIGHT: 62 IN | WEIGHT: 140 LBS | DIASTOLIC BLOOD PRESSURE: 69 MMHG | SYSTOLIC BLOOD PRESSURE: 127 MMHG | HEIGHT: 62 IN | TEMPERATURE: 98.1 F | OXYGEN SATURATION: 94 % | OXYGEN SATURATION: 94 % | HEART RATE: 75 BPM | BODY MASS INDEX: 25.76 KG/M2 | DIASTOLIC BLOOD PRESSURE: 85 MMHG | HEART RATE: 71 BPM

## 2021-05-28 NOTE — PROGRESS NOTES
Patient: LESTER JUAREZ     Acct: [id number]     Report: #[id number]  UNIT #: [id number]     : 1931    Encounter Date:2020  PRIMARY CARE: RACHEL MUNOZ  ***Signed***  --------------------------------------------------------------------------------------------------------------------  History of Present Illness            Chief Complaint: F/U, COPD            Lester Juarez is presenting for evaluation via Telehealth visit. Verbal consent     obtained before beginning visit.            PAST MEDICAL HISTORY/OVERVIEW OF PATIENT SYMPTOMS            Symptoms: SOA            Any known Exposure to COVID-19: No            Former smoker (1 ppd for 30 years quit for 1 month)            HX: COPD, Respiratory Failure, Emphysema             Provider spent 15 minutes with the patient during telehealth visit.            The following staff were present during this visit: Ann Marie Anne CMA, Lauren STEVENS             The patient is an 89 year old female, patient of Dr. Whiteside's with COPD and     asthma overlap syndrome, chronic hypoxic respiratory failure, elevated IgE     level, history of mold exposure and tobacco abuse with cigarettes in remission     who presents for TeleHealth visit today. The patient states that since last     visit her breathing is doing well.  The patient states that she will get short     of air that is worse with exertion, moderate in severity and improved with rest.     The patient states she is taking Stiolto inhaler everyday as prescribed, gera calzada has been out of budesonide nebulizer treatments for a few days and needs a     refill sent to the pharmacy. The patient states she uses her albuterol     nebulizers as needed. The patient currently denies any cough, wheezing, fever,     chills, night sweats, hemoptysis, purulent sputum production, chest pain, chest     tightness, swollen glands in the head and neck, abdominal pain, nausea, vomiting    or diarrhea.  The patient  denies any headaches, myalgias, sore throat, changes     in sense of taste and/or smell or any other coronavirus or flu-like symptoms.      The patient also states that she is needing some new tubing for her nebulizer     machine. The patient states that she is able to perform all of her ADLs without     difficulty.  The patient states that she has been trying to stay at home due to     the COVID19 pandemic. The patient states she has not had to take any antibiotics    or steroids since last visit and denies any hospitalizations.              I have personally reviewed the review of systems, past family, social, surgical     and medical histories and I agree with those as entered in the chart.              Physical exam deferred due to TeleHealth visit.              I reviewed Dr. Whiteside's last TeleHealth visit note.                     Allergies and Medications      Allergies:        Coded Allergies:             PENICILLINS (Verified  Allergy, Mild, RASH, 9/23/20)      Medications    Last Reconciled on 9/23/20 14:26 by Andrea PAGE-Budesonide (Budesonide) 0.5 Mg/2 Ml Ampul.neb      0.5 MG INH BID, #60 NEB 5 Refills         Prov: CANDACE ORTIZ [facility]         9/23/20       Tiotropium Br/Olodaterol HCl (Stiolto Respimat Inhal Spray) 4 Gm Mist.inhal      2 PUFFS INH RTQDAY, #1 INH 9 Refills         Prov: CANDACE ORTIZ [facility]         9/23/20       MDI-Albuterol (Ventolin HFA) 8 Gm Hfa.aer.ad      2 PUFFS INH Q4-6H PRN for DYSPNEA, #1 INH 6 Refills         Prov: CANDACE ORTIZ [facility]         9/23/20       Montelukast Sodium (Singulair*) 10 Mg Tab      10 MG PO HS, #30 TAB 11 Refills         Prov: CANDACE ORTIZ [facility]         9/23/20       Neb-Budesonide (Budesonide) 0.5 Mg/2 Ml Ampul.neb      0.5 MG INH RTBID, #60 NEB 11 Refills         Prov: Jasmeet Whiteside         9/21/20       Albuterol Sulfate (Albuterol Sulfate) 1.25 Mg/3 Ml Vial.neb      1.25 MG INH RTTID for 30 Days, #90 NEB         Reported          5/22/20       Solifenacin (Vesicare*) 5 Mg Tab      10 MG PO QDAY, #30 TAB 0 Refills         Prov: Rhys Park         8/15/19       guaiFENesin LA (Humibid La) 600 Mg Tab.er.12h      1200 MG PO BID, #20 TAB.ER 0 Refills         Prov: Rhys Park         8/15/19       Metoprolol Succinate (Metoprolol Succinate) 25 Mg Tab.er.24h      12.5 MG PO QDAY, #15 TAB 0 Refills         Reported         8/13/19       Potassium Chloride Liquid (Potassium Chloride Liquid) 20 Meq/15 Ml Solution      20 MEQ PO BID, BOTTLE         Reported         7/26/17       Magnesium Oxide (Magnesium Oxide*) 400 Mg Tablet      400 MG PO BID, #60 TAB 0 Refills         Reported         7/26/17       Apixaban (Eliquis) 2.5 Mg Tablet      2.5 MG PO BID for 30 Days, #60 TAB         Reported         7/26/17       Spironolactone (Spironolactone*) 25 Mg Tablet      25 MG PO PC MARYLIN, #30 TAB 0 Refills         Reported         2/8/16       Alendronate Sodium (Alendronate) 70 Mg Tablet      70 MG PO Kidd, #4 TAB         Reported         7/24/15       Atorvastatin (Atorvastatin) 40 Mg Tab      40 MG PO HS, #30 TAB 0 Refills         Reported         1/14/15       Aspirin (Aspirin Low-Strength 81 Mg) 81 Mg Tab      1 TAB PO QDAY, TAB 0 Refills         Reported         7/22/09       amLODIPine-Benazepril 5-20 Mg (amLODIPine-Benazepril 5-20 Mg) 1 Cap Cap      1 EACH PO QAM, 0 Refills         Reported         7/22/09            Assessment      Shortness of Air  R06.02            Plan      Orders:  Phone Eval 11-20 mi [id number]      Instructions      * Chronic conditions reviewed and taken in consideration for today's treatment       plan.      * Plan Of Care: ()      * Patient instructed to seek medical attention urgently for new or worsening       symptoms.      * Patient was educated/instructed on their diagnosis, treatment and medications       today.      * Recommend self monitoring. Instructions given.      * Recommend self quarantine for 14 days.      *  Recommend self quarantine until without fever for 72 hours without using fever       reducing medications.      * Recommends over the counter medications for symptom management.            ASSESSMENT:       1. Asthma/COPD overlap syndrome. The patient on triple therapy.      2. Chronic hypoxic respiratory failure.      3.  Elevated IgE level.      4. History of mold exposure.      5. Tobacco abuse with cigarettes in remission.            PLAN:      1.  The patient to continue Stiolto everyday as prescribed.      2.  The patient reports she has been out of her budesonide for a few days.     Refills sent to the pharmacy today. The patient is advised to rinse her mouth     out after each use.      3. The patient to continue albuterol and nebulizer treatments as needed.  I will     send patient albuterol inhaler to carry in her purse to use as needed.      4. Patient is advised to call the office, 911 or go to the ER with any new or     worsening symptoms.      5.  The patient states she is going to check with her PCP to see if she is     up-to-date with her pneumonia vaccines and will notify our office.  The patient     states she will be receiving her flu vaccine at her local pharmacy.  The patient     is advised to send a copy of the record to our office so we can update her     medical record here in our office.      6.  The patient is needing new nebulizer tubing.  I will have Te Whittington,     Clinical Coordinator set patient up with new nebulizer tubing.        7. Follow up with Dr. Whiteside in four months, sooner if needed.            Electronically signed by CANDACE ORTIZ [facility]  09/24/2020 19:31       Disclaimer: Converted document may not contain table formatting or lab diagrams. Please see [employer] for the authenticated document.

## 2021-05-28 NOTE — PROGRESS NOTES
Patient: LESTER CARL     Acct: [id number]     Report: #[id number]  UNIT #: [id number]     : 1931    Encounter Date:2020  PRIMARY CARE: RACHEL MUNOZ  ***Signed***  --------------------------------------------------------------------------------------------------------------------  Chief Complaint      Encounter Date      2020            Primary Care Provider      RACHEL MUNOZ            Referring Provider      RACHEL MUNOZ            Patient Complaint      Patient is complaining of      f/u copd            VITALS      Height 5 ft 2 in / 157.48 cm      Weight 140 lbs 3 oz / 63.330033 kg      BSA 1.64 m2      BMI 25.6 kg/m2      Temperature 98.4 F / 36.89 C - Oral      Pulse 91      Respirations 18      Blood Pressure 154/66 Sitting, Right Arm      Pulse Oximetry 90%, nasal cannula, 3 lpm      Initial Exhaled Nitrous Oxide      Date:  2019      Exhaled Nitrous Oxide Results:  64 ([phone])            HPI      The patient is an 88 year old female with a history of COPD and asthma overlap,     chronic hypoxic respiratory failure, elevated IgE with recent fungal workup     which were all negative, history of mold exposure at home, tobacco abuse with     cigarettes in remission who is here for follow up.  Since her last office visit,    she was suppose to be on Brovana and Pulmicort and Spiriva was added because of     the cost issue with Yupelri, but somehow she is saying that Brovana is also     expensive and she was not able to use it. She is currently using Spiriva and     budesonide. It is helping her some, but she is short of breath with activities.     She has no chest pain, chest tightness, nausea, vomiting, fever or chills.  She     has shortness of breath with minimal activities and is not able to move around     much at all.            ROS      Constitutional:  Complains of: Fatigue; Denies: Fever, Weight gain, Weight loss,    Chills, Insomnia, Other       Respiratory/Breathing:  Complains of: Shortness of air, Wheezing; Denies: Cough,    Hemoptysis, Pleuritic pain, Other      Endocrine:  Denies: Polydipsia, Polyuria, Heat/cold intolerance, Abnorml     menstrual pattern, Diabetes, Other      Eyes:  Denies: Blurred vision, Vision Changes, Other      Ears, nose, mouth, throat:  Denies: Congestion, Dysphagia, Hearing Changes, Nose    Bleeding, Nasal Discharge, Throat pain, Tinnitus, Other      Cardiovascular:  Denies: Chest Pain, Exertional dyspnea, Peripheral Edema,     Palpitations, Syncope, Wake up Gasping for air, Orthopnea, Tachycardia, Other      Gastrointestinal:  Denies: Abdominal pain/cramping, Bloody stools, Constipation,    Diarrhea, Melena, Nausea, Vomiting, Other      Genitourinary:  Denies: Dysuria, Urinary frequency, Incontinence, Hematuria,     Urgency, Other      Musculoskeletal:  Denies: Joint Pain, Joint Stiffness, Joint Swelling, Myalgias,    Other      Hematologic/lymphatic:  DENIES: Lymphadenopathy, Bruising, Bleeding tendencies,     Other      Neurologic:  Denies: Headache, Numbness, Weakness, Seizures, Other      Psychiatric:  Denies: Anxiety, Appropriate Effect, Depression, Other      Sleep:  No: Excessive daytime sleep, Morning Headache?, Snoring, Insomnia?, Stop    breathing at sleep?, Other      Integumentary:  Denies: Rash, Dry skin, Skin Warm to Touch, Other            FAMILY/SOCIAL/MEDICAL HX      Surgical History:  Yes: Bladder Surgery (TURBT), Bowel Surgery (COLONOSCOPY),     Breast Surgery (DRAINAGE OF CYST, RIGHT), Carotid Stenosis (RIGHT CEA 1-12-05),     Head Surgery (LEOD ), Oral Surgery (TEETH EXTRACTIONS), Orthopedic Surgery (L4-    L5 LAMINECTOMY), Spinal Surgery (DISC REPAIR), Vascular Surgery (RIGHT CAROTID     ENDARTERECTOMY), Other Surgeries; No: AAA Repair, Abdominal Surgery, Angioplas    ty, Appendectomy, Back Surgery, CABG, Cholecystectomy, Ear Surgery, Eye Surgery,    Hernia Surgery, Kidney Surgery, Nose Surgery,  Prostatectomy, Rectal Surgery,     Testicular Surgery, Throat Surgery, Valve Replacement      Heart - Family Hx:  Father      Diabetes - Family Hx:  Aunt      Is Father Still Living?:  No      Is Mother Still Living?:  No       Family History:  Yes      Social History:  Tobacco Use; No Alcohol Use, No Recreational Drug use      Smoking status:  Former smoker (1 ppd for 30 years quit for 1 month)      Hysterectomy:  Yes      Anticoagulation Therapy:  No      Antibiotic Prophylaxis:  No      Medical History:  Yes: Arthritis (LEGS AND HIP AREA), Chronic Bronchitis/COPD,     Congestive Heart Failu, Heart Attack (1986), Hemorrhoids/Rectal Prob     (COLONOSCOPY LAST YEAR), High Blood Pressure, Shortness Of Breath, Miscellaneous    Medical/oth (OVERPRODUCTION OF RED BLOOD CELLS); No: Alcoholism, Allergies,     Anemia, Asthma, Blood Disease, Broken Bones, Cataracts, Chemical Dependency,     Chemotherapy/Cancer, Emphysema, Chronic Liver Disease, Colon Trouble, Colitis,     Diverticulitis, Deafness or Ringing Ears, Convulsions, Depression, Anxiety,     Bipolar Disorder, Diabetes, Epilepsy, Seizures, Forgetfullness, Glaucoma, Gall     Stones, Gout, Head Injury, Heart Murmur, Hepatitis, Hiatal Hernia, High     Cholesterol, HIV (Do not ask - volu, Jaundice, Kidney or Bladder Disease, Kidney    Stones, Migrane Headaches, Mitral Valve Prolapse, Night sweats, Phlebitis,     Psychiatric Care, Reflux Disease, Rheumatic Fever, Sexually Transmitted Dis,     Sinus Trouble, Skin Disease/Psoriais/Ecz, Stroke, Thyroid Problem, Tuberculosis     or Pos TB Te      Psychiatric History      none            PREVENTION      Hx Influenza Vaccination:  Yes      Date Influenza Vaccine Given:  Oct 1, 2019      Influenza Vaccine Declined:  No      2 or More Falls Past Year?:  No      Fall Past Year with Injury?:  No      Hx Pneumococcal Vaccination:  Yes      Encouraged to follow-up with:  PCP regarding preventative exams.      Chart initiated by       farida jean/ ma            ALLERGIES/MEDICATIONS      Allergies:        Coded Allergies:             PENICILLINS (Verified  Allergy, Mild, RASH, 2/6/20)      Medications    Last Reconciled on 2/6/20 11:55 by NEHEMIAS WHITESIDE MD      Tiotropium Br/Olodaterol HCl (Stiolto Respimat Inhal Spray) 4 Gm Mist.inhal               Prov: Nehemias Whiteside         2/6/20       predniSONE (Deltasone) 10 Mg Tablet      10 MG PO ASDIR, #45 TAB 0 Refills         Prov: Nehemias Whiteside         2/6/20       Montelukast Sodium (Singulair*) 10 Mg Tab      10 MG PO HS, #30 TAB 3 Refills         Prov: Nehemias Whiteside         1/3/20       MDI-Albuterol (Ventolin HFA) 8 Gm Hfa.aer.ad      2 PUFFS INH Q4-6H PRN for DYSPNEA, #1 INH 6 Refills         Prov: Nehemias Whiteside         12/12/19       Arformoterol Tartrate (Brovana) 15 Mcg/2 Ml Vial.neb      15 MCG INH RTBID, #60 NEB 11 Refills         Prov: Marielle Kee PA-C         8/29/19       Neb-Budesonide (Budesonide) 0.5 Mg/2 Ml Ampul.neb      0.5 MG INH RTBID, #60 NEB 11 Refills         Prov: Marielle Kee PA-C         8/29/19       Tiotropium Bromide (Spiriva Respimat 2.5 mcg/Puff) 4 Gm Mist.inhal      2 PUFFS INH QDAY, #1 MDI 11 Refills         Prov: Marielle Kee PA-C         8/29/19       Revefenacin (YUPELRI) 175 Mcg/3 Ml Nebu      175 MCG INH RTQDAY for 30 Days, #30 NEB 0 Refills         Prov: Rhys Park         8/15/19       Solifenacin (Vesicare*) 5 Mg Tab      10 MG PO QDAY, #30 TAB 0 Refills         Prov: Rhys Park         8/15/19       predniSONE (predniSONE) 20 Mg Tablet      40 MG PO QDAY, #10 TAB         Prov: Rhys Park         8/15/19       guaiFENesin LA (Humibid La) 600 Mg Tab.er.12h      1200 MG PO BID, #20 TAB.ER 0 Refills         Prov: VivianRhys         8/15/19       Metoprolol Succinate (Metoprolol Succinate) 25 Mg Tab.er.24h      12.5 MG PO QDAY, #15 TAB 0 Refills         Reported         8/13/19       Potassium Chloride Liquid (Potassium Chloride  Liquid) 20 Meq/15 Ml Solution      20 MEQ PO BID, BOTTLE         Reported         7/26/17       Magnesium Oxide (Magnesium Oxide*) 400 Mg Tablet      400 MG PO BID, #60 TAB 0 Refills         Reported         7/26/17       Apixaban (Eliquis) 2.5 Mg Tablet      2.5 MG PO BID for 30 Days, #60 TAB         Reported         7/26/17       Spironolactone (Spironolactone*) 25 Mg Tablet      25 MG PO PC MARYLIN, #30 TAB 0 Refills         Reported         2/8/16       Alendronate Sodium (Alendronate) 70 Mg Tablet      70 MG PO Kidd, #4 TAB         Reported         7/24/15       Atorvastatin (Atorvastatin) 40 Mg Tab      40 MG PO HS, #30 TAB 0 Refills         Reported         1/14/15       Aspirin (Aspirin Low-Strength 81 Mg) 81 Mg Tab      1 TAB PO QDAY, TAB 0 Refills         Reported         7/22/09       amLODIPine-Benazepril 5-20 Mg (amLODIPine-Benazepril 5-20 Mg) 1 Cap Cap      1 EACH PO QAM, 0 Refills         Reported         7/22/09      Current Medications      Current Medications Reviewed 2/6/20            EXAM      CONSTITUTIONAL: Pleasant elderly female in no acute distress,  normal     conversant.       EYES : Pink conjunctive, no ptosis, PERRL.       ENMT : Nose and ears appear normal, normal dentition, mild posterior pharyngeal     wall erythema. Mallampati classification II, no sinus tenderness.       Neck: Nontender, no masses, no thyromegaly, no nodules.      Resp : Bilateral significant diminished breath sounds, resonant to percussion     bilaterally, bilateral expiratory wheezing, no crackles, scattered rhonchi.      CVS  : No carotid bruits, s1s2 nl, RRR, no murmur, rubs or gallop, no peripheral    edema       Chest wall: Normal rise with inspiration, nontender on palpation. Increased AP     diameter.       GI   : Abdomen soft, with no masses, no hepatosplenomegaly, no hernias, BS+      MSK  : Normal gait and station, no digital cyanosis or clubbing       Skin : No rashes, ulcerations or lesions, normal turgor and  temperature      Neuro: CN II - XII intact, no sensory deficits, DTRs intact and symmetrical, no     motor weakness      Psych: Appropriate affect, A   Vitals      Vitals:             Height 5 ft 2 in / 157.48 cm           Weight 140 lbs 3 oz / 63.440514 kg           BSA 1.64 m2           BMI 25.6 kg/m2           Temperature 98.4 F / 36.89 C - Oral           Pulse 91           Respirations 18           Blood Pressure 154/66 Sitting, Right Arm           Pulse Oximetry 90%, nasal cannula, 3 lpm            REVIEW      Results Reviewed      P[facility]S Results Reviewed?:  Yes Prev Lab Results, Yes Prev Radiology Results, Yes     Previous [facility] Records            Assessment      Notes      New Medications      * predniSONE (Deltasone) 10 MG TABLET: 10 MG PO ASDIR #45         Instructions: [id number],08wmr4k,38adn4d,79nnr4g,36uzt9g      * Tiotropium Br/Olodaterol HCl (Stiolto Respimat Inhal Spray) 4 GM MIST.INHAL          Sample - Qty 2         Instructions: 1 puffs qd         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      1.  COPD and asthma overlap.  Switch Spiriva to Stiolto.  Continue with     budesonide twice a day. Use albuterol as needed.  Given her worsening shortness     of breath, I have written for a script of prednisone to use for the next 15 days    .        2. I will follow up with her in 2-3 months, earlier if needed.            Patient Education      Education resources provided:  Yes      Patient Education Provided:  Acute Bronchitis            Electronically signed by Jasmeet Whiteside   02/21/2020 15:53       Disclaimer: Converted document may not contain table formatting or lab diagrams. Please see [employer] for the authenticated document.

## 2021-05-28 NOTE — PROGRESS NOTES
Patient: LESTER CARL     Acct: [id number]     Report: #[id number]  UNIT #: [id number]     : 1931    Encounter Date:2019  PRIMARY CARE: RACHEL MUNOZ  ***Signed***  --------------------------------------------------------------------------------------------------------------------  Chief Complaint      Encounter Date      2019            Primary Care Provider      RACHEL MUNOZ            Referring Provider      SELF,REFERRED            Patient Complaint      Patient is complaining of      3 month follow up/cxr            VITALS      Height 5 ft 2 in / 157.48 cm      Weight 134 lbs 4 oz / 60.74672 kg      BSA 1.61 m2      BMI 24.6 kg/m2      Temperature 98.1 F / 36.72 C - Oral      Pulse 64      Respirations 16      Blood Pressure 106/60 Sitting, Right Arm      Pulse Oximetry 93%, room air      Initial Exhaled Nitrous Oxide      Date:  2019      Exhaled Nitrous Oxide Results:  64 ([phone])            HPI      The patient is an 87 year old female with a history of smoking in the past with     moderately severe COPD and asthma overlap.  She is here for follow up.  Since     her last office visit she has been on Symbicort and Spiriva as well as Qvar.      She is on albuterol and DuoNeb.    She does not have rescue in haler at home to     use now, but uses DuoNeb 2-3 times a day at least.  She is on Singulair and     Allegra as well.  Her FENO last visit was 64.  She continues to have some     overall, but overall symptoms are improved on current therapies. I checked CBC,     CMP, chest x-ray and beta-d glucan which was reviewed again today.  Chest x-ray     shows cardiomegaly and emphysema.  Once repeated, beta-d glucan and serum IgE     level were normal. CBC and CMP and were within reasonable limits. She had     concern for mold exposure in the past, but so far imaging shows emphysema and no    significant infiltrate.  Serum IgE level has been very high with the last  one     being 937.  She does have shortness of breath with activities, has cough and     wheezing at baseline.  She has problems, especially walking up and down the     stairs.            ROS      Constitutional:  Complains of: Fatigue; Denies: Fever, Weight gain, Weight loss,    Chills, Insomnia, Other      Respiratory/Breathing:  Complains of: Shortness of air, Wheezing, Cough; Denies:    Hemoptysis, Pleuritic pain, Other      Endocrine:  Denies: Polydipsia, Polyuria, Heat/cold intolerance, Abnorml     menstrual pattern, Diabetes, Other      Eyes:  Denies: Blurred vision, Vision Changes, Other      Ears, nose, mouth, throat:  Denies: Mouth lesions, Thrush, Throat pain,     Hoarseness, Allergies/Hay Fever, Post Nasal Drip, Headaches, Recent Head Injury,    Nose Bleeding, Neck Stiffness, Thyroid Mass, Hearing Loss, Ear Fullness, Dry     Mouth, Nasal or Sinus Pain, Dry Lips, Nasal discharge, Nasal congestion, Other      Cardiovascular:  Denies: Palpitations, Syncope, Claudication, Chest Pain, Wake     up Gasping for air, Leg Swelling, Irregular Heart Rate, Cyanosis, Dyspnea on     Exertion, Other      Gastrointestinal:  Denies: Nausea, Constipation, Diarrhea, Abdominal pain,     Vomiting, Difficulty Swallowing, Reflux/Heartburn, Dysphagia, Jaundice,     Bloating, Melena, Bloody stools, Other      Genitourinary:  Denies: Urinary frequency, Incontinence, Hematuria, Urgency,     Nocturia, Dysuria, Testicular problems, Other      Musculoskeletal:  Denies: Joint Pain, Joint Stiffness, Joint Swelling, Myalgias,    Other      Hematologic/lymphatic:  DENIES: Lymphadenopathy, Bruising, Bleeding tendencies,     Other      Neurological:  Denies: Headache, Numbness, Weakness, Seizures, Other      Psychiatric:  Denies: Anxiety, Appropriate Effect, Depression, Other      Sleep:  No: Excessive daytime sleep, Morning Headache?, Snoring, Insomnia?, Stop    breathing at sleep?, Other      Integumentary:  Denies: Rash, Dry skin, Skin  Warm to Touch, Other      Immunologic/Allergic:  Denies: Latex allergy, Seasonal allergies, Asthma,     Urticaria, Eczema, Other      Immunization status:  No: Up to date            FAMILY/SOCIAL/MEDICAL HX      Surgical History:  Yes: Bladder Surgery (TURBT), Bowel Surgery (COLONOSCOPY),     Breast Surgery (DRAINAGE OF CYST, RIGHT), Carotid Stenosis (RIGHT CEA 1-12-05),     Head Surgery (LEOD ), Oral Surgery (TEETH EXTRACTIONS), Orthopedic Surgery (L4-    L5 LAMINECTOMY), Spinal Surgery (DISC REPAIR), Vascular Surgery (RIGHT CAROTID     ENDARTERECTOMY), Other Surgeries; No: AAA Repair, Abdominal Surgery,     Angioplasty, Appendectomy, Back Surgery, CABG, Cholecystectomy, Ear Surgery, Eye    Surgery, Hernia Surgery, Kidney Surgery, Nose Surgery, Prostatectomy, Rectal     Surgery, Testicular Surgery, Throat Surgery, Valve Replacement      Heart - Family Hx:  Father      Diabetes - Family Hx:  Aunt      Is Father Still Living?:  No      Is Mother Still Living?:  No       Family History:  Yes      Social History:  Tobacco Use; No Alcohol Use, No Recreational Drug use      Smoking status:  Former smoker (1 ppd for 30 years quit for 1 month)      Hysterectomy:  Yes      Anticoagulation Therapy:  No      Antibiotic Prophylaxis:  No      Medical History:  Yes: Arthritis (LEGS AND HIP AREA), Chronic Bronchitis/COPD,     Heart Attack (1986), Hemorrhoids/Rectal Prob (COLONOSCOPY LAST YEAR), High Blood    Pressure, Shortness Of Breath, Miscellaneous Medical/oth (OVERPRODUCTION OF RED     BLOOD CELLS); No: Alcoholism, Allergies, Anemia, Asthma, Blood Disease, Broken     Bones, Cataracts, Chemical Dependency, Chemotherapy/Cancer, Emphysema, Chronic     Liver Disease, Colon Trouble, Colitis, Diverticulitis, Congestive Heart Failu,     Deafness or Ringing Ears, Convulsions, Depression, Anxiety, Bipolar Disorder,     Diabetes, Epilepsy, Seizures, Forgetfullness, Glaucoma, Gall Stones, Gout, Head     Injury, Heart Murmur, Hepatitis,  Hiatal Hernia, High Cholesterol, HIV (Do not     ask - volu, Jaundice, Kidney or Bladder Disease, Kidney Stones, Migrane     Headaches, Mitral Valve Prolapse, Night sweats, Phlebitis, Psychiatric Care,     Reflux Disease, Rheumatic Fever, Sexually Transmitted Dis, Sinus Trouble, Skin     Disease/Psoriais/Ecz, Stroke, Thyroid Problem, Tuberculosis or Pos TB Te      Psychiatric History      none            PREVENTION      Hx Influenza Vaccination:  No      Date Influenza Vaccine Given:  Oct 1, 2018      Influenza Vaccine Declined:  Yes      2 or More Falls Past Year?:  No      Fall Past Year with Injury?:  No      Hx Pneumococcal Vaccination:  Yes      Encouraged to follow-up with:  PCP regarding preventative exams.      Chart initiated by      farida jean ma            ALLERGIES/MEDICATIONS      Allergies:        Coded Allergies:             PENICILLINS (Verified  Allergy, Mild, RASH, 1/29/19)      Medications    Last Reconciled on 1/29/19 14:19 by MD ERIKA CABRERA-Albuterol (Ventolin HFA) 8 Gm Hfa.aer.ad      2 PUFFS INH Q4-6H PRN for DYSPNEA, #1 INH 6 Refills         Prov: Jasmeet Whiteside         1/29/19       Montelukast Sodium (Singulair*) 10 Mg Tab      10 MG PO HS, #30 TAB 9 Refills         Prov: Jasmeet Whiteside         11/19/18       Albuterol/Ipratropium (Duoneb) 3 Ml Ampul.neb      3 ML INH RTQ4H, #180 NEB 3 Refills         Prov: Jasmeet Whiteside         10/15/18       Beclomethasone Dipropionate (Qvar 40 Redihaler 10.6 GM) 10.6 Gm Hfa.aeroba      2 PUFF INH RTBID, #1 INH 3 Refills         Prov: Jasmeet Whiteside         6/27/18       Tiotropium Bromide (Spiriva Respimat 2.5 mcg/Puff) 4 Gm Mist.inhal      2 PUFFS INH RTQDAY, #1 MDI 9 Refills         Prov: Jasmeet Whiteside         6/27/18       Budesonide/Formoterol Fumarate (Symbicort 160/4.5 Mcg) 10.2 Gm Inh      2 PUFF INH BID, #1 INH 9 Refills         Prov: Jasmeet Whiteside         6/27/18       Fexofenadine Hcl (Fexofenadine Hcl) 180 Mg Tablet      180 MG PO QDAY, #30  TAB 9 Refills         Prov: Jasmeet Whiteside         11/7/17       Potassium Chloride (KCl*) 20 Meq/15 Ml Solution      20 MEQ PO BID, BOTTLE         Reported         7/26/17       Magnesium Oxide (Magnesium Oxide*) 400 Mg Tablet      400 MG PO BID, #60 TAB 0 Refills         Reported         7/26/17       LORazepam (LORazepam) 0.5 Mg Tablet      0.5 MG PO Q8H, TAB         Reported         7/26/17       Solifenacin (Vesicare*) 5 Mg Tab      10 MG PO QDAY, TAB         Reported         7/26/17       Apixaban (Eliquis) 2.5 Mg Tablet      2.5 MG PO BID for 30 Days, #60 TAB         Reported         7/26/17       Metoprolol Succinate (Metoprolol Succinate*) 50 Mg Tab.er.24h      25 MG PO QDAY, #15 TAB.SR.24H 0 Refills         Reported         7/26/17       Spironolactone (Spironolactone*) 25 Mg Tablet      25 MG PO PC MARYLIN, #30 TAB 0 Refills         Reported         2/8/16       Alendronate Sodium (Alendronate) 70 Mg Tablet      70 MG PO Kidd, #4 TAB         Reported         7/24/15       Atorvastatin (Atorvastatin) 40 Mg Tab      40 MG PO HS, #30 TAB 0 Refills         Reported         1/14/15       Calcium Carbonate/Vitamin D3 (Calcium 600 With Vit D Tab) 1 Each Tablet      1 TAB PO QDAY         Reported         6/13/11       Aspirin (Aspirin Low-Strength 81 Mg) 81 Mg Tab      1 TAB PO QDAY, TAB 0 Refills         Reported         7/22/09       Amlodipine/Benazepril Hcl (Amlodipine/Benazepril 5/20 Mg) 1 Cap Cap      1 EACH PO QAM, 0 Refills         Reported         7/22/09      Current Medications      Current Medications Reviewed 1/29/19            EXAM      CONSTITUTIONAL: Pleasant elderly female in no acute distress,  normal     conversant.       EYES : Pink conjunctive, no ptosis, PERRL.       ENMT : Nose and ears appear normal, normal dentition, mild posterior pharyngeal     wall erythema. Mallampati classification II, no sinus tenderness.       Neck: Nontender, no masses, no thyromegaly, no nodules.      Resp : Bilateral  significant diminished breath sounds, resonant to percussion     bilaterally, bilateral expiratory wheezing, no crackles, scattered rhonchi.      CVS  : No carotid bruits, s1s2 nl, RRR, no murmur, rubs or gallop, no peripheral    edema       Chest wall: Normal rise with inspiration, nontender on palpation. Increased AP     diameter.       GI   : Abdomen soft, with no masses, no hepatosplenomegaly, no hernias, BS+      MSK  : Normal gait and station, no digital cyanosis or clubbing       Skin : No rashes, ulcerations or lesions, normal turgor and temperature      Neuro: CN II - XII intact, no sensory deficits, DTRs intact and symmetrical, no     motor weakness      Psych: Appropriate affect, A   Vtials      Vitals:             Height 5 ft 2 in / 157.48 cm           Weight 134 lbs 4 oz / 60.90600 kg           BSA 1.61 m2           BMI 24.6 kg/m2           Temperature 98.1 F / 36.72 C - Oral           Pulse 64           Respirations 16           Blood Pressure 106/60 Sitting, Right Arm           Pulse Oximetry 93%, room air            REVIEW      Results Reviewed      PCCS Results Reviewed?:  Yes Prev Lab Results, Yes Prev Radiology Results, Yes     Previous [facility] Records      Lab Results      Patient's serum IgE level was 937.  The patient's blood work including CBC shows    normal renal panel.  Renal function was normal.  Beta-d glucan was negative.      Radiographic Results               [facility]                PACS RADIOLOGY REPORT            Patient: LESTER CARL   Acct: #[id number]   Report: #[id number]            UNIT #: [id number]    DOS: 10/17/18 1434      INSURANCE:MEDICARE PART A   LOCATION:[facility]     : 1931            PROVIDERS      ADMITTING:     ATTENDING: Jasmeet Whiteside      FAMILY:  NONE,MD   ORDERING:  Jasmeet Whiteside         OTHER:    DICTATING:  Philip De Jesus MD            REQ #:[id number]   EXAM:CXR2 - CHEST 2V AP PA LAT      REASON FOR  EXAM:        REASON FOR VISIT:  SOB            *******Signed******         PROCEDURE:   CHEST AP/PA AND LATERAL             COMPARISON:   [facility], CT, CHEST W/ CONTRAST, 7/26/2017, 15:35.     [facility], CR, CHEST PA/AP          INDICATIONS:   Persistent shortness of breath             FINDINGS:         The lungs are hyperexpanded consistent with emphysematous changes.  No acute     infiltrate or effusion       is seen.  Mild cardiomegaly is stable.             CONCLUSION:         1. Marked emphysematous changes      2. Mild cardiomegaly, unchanged              Philip De Jesus M.D.             Electronically Signed and Approved By: Philip De Jesus M.D. on 10/17/2018 at 15:04                           Until signed, this is an unconfirmed preliminary report that may contain      errors and is subject to change.                                              WURRO:      D:10/17/18 1504      PFT Results      [id number]  [id number]                                 [facility]                          [employer]                            [place], [place]  [address]               __________________________________________________________________________             Patient Name:                   Attending Physician:      Stacey Juarez M.D.             Patient Visit # MR #            Admit Date  Disch Date     Location      [id number]    [id number]      10/17/2017                 [facility]- -             Date of Birth      09/17/1931      __________________________________________________________________________      821 - DIAGNOSTIC REPORT             PULMONARY FUNCTION TEST             SPIROMETRY:      Spirometry shows moderately severe obstructive defect.      FEV1/FVC is 47%.      FEV1 is 8.67 L, 53% of predicted.      FVC is 1.44 L, 88% of predicted.      There is a significant response to  bronchodilator administration.  FVC      increased from 1.44 L to 1.6 L, 17% increase.  FEV1 increased from 0.67 to      0.76 L, 13% increase.             LUNG VOLUMES:      Lung volumes show hyperinflation and air trapping.      Total lung capacity is 9.19 L, 223% of predicted.      Residual volume is 7.74 L, 373% of predicted.             DIFFUSION:      Diffusion capacity is severely decreased at 14% of predicted.             FLOW VOLUME LOOP:      Flow volume loop is suggestive of severe obstructive process.             CONCLUSION:      Low FEV1/FVC with low FEV1 and FVC with air trapping, hyperinflation, and low      diffusion capacity.  It suggests moderately severe obstructive airway      disease, likely emphysema.  Disproportionately low diffusion capacity could      suggest underlying secondary process, either interstitial lung disease or      pneumonitis.  There is some reversibility seen on pulmonary function test as      well.  Please correlate clinically.             To be electronically signed in [employer]      [id number] JASMEET WHITESIDE M.D.             NK:ghanshyam      D:  10/19/2017 07:53      T:  10/19/2017 15:29      #[id number]             Until signed, this is an unconfirmed preliminary report that may contain      errors and is subject to change.                   10/23/17 1152  <Electronically signed by Jasmeet Whiteside MD>            Assessment      Notes      New Medications      * MDI-Albuterol (Ventolin HFA) 8 GM HFA.AER.AD: 2 PUFFS INH Q4-6H PRN DYSPNEA #1      PLAN:      The patient is an 87 year old female with a history of smoking in the past who h    as moderately severe COPD and asthma overlap.              1. Chronic obstructive pulmonary disease/asthma overlap. Continue with Symbicort    twice a day and Spiriva once daily and Qvar twice a day. Use albuterol as     needed.  CMP, CBC and chest x-ray were reviewed.  Beta-d glucan level was     normal.  Serum IgE level is very high.  Continue with Singulair.  Continue with     DuoNeb. I will put her on albuterol as needed.  She may need Allegra on drop of     Singulair.              2. Obstructive sleep apnea. Sleep study showed mild REM sleep related apnea, but    otherwise stable, hold off on CPAP machine now.              3.  Follow up in 3-4 months, earlier if needed.            Patient Education      Education resources provided:  Yes      Patient Education Provided:  Acute Bronchitis                 Disclaimer: Converted document may not contain table formatting or lab diagrams. Please see [employer] for the authenticated document.

## 2021-05-28 NOTE — PROGRESS NOTES
Patient: LESTER CARL     Acct: [id number]     Report: #[id number]  UNIT #: [id number]     : 1931    Encounter Date:10/17/2018  PRIMARY CARE: RACHEL MUNOZ  ***Signed***  --------------------------------------------------------------------------------------------------------------------  Chief Complaint      Encounter Date      Oct 17, 2018            Primary Care Provider      RACHEL MUNOZ            Referring Provider      SELF,REFERRED            Patient Complaint      Patient is complaining of      Pt here for 4m f/u, xray and lab results. 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  She is here for follow     up.  She continues to be on Symbicort two puffs twice a day, Spiriva once daily,    Qvar twice a day and albuterol as needed. She already had her flu shot this     year. 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 She    has no fever, chills, nausea or vomiting. She feels short of breath and has lack    of energy. She has no chest pain or chest tightness.  No nausea, vomiting or     skin rash.  She uses rescue inhalers 1-2 times a day.  She has not needed any     antibiotics or steroids since her last office visit.  She has not needed any     hospitalization for problems breathing as well.            ROS      Constitutional:  Denies: Fatigue, Fever, Weight gain, Weight loss, Chills, I    nsomnia, Other      Respiratory/Breathing:   Complains of: Shortness of air; Denies: Wheezing, Cough,    Hemoptysis, Pleuritic pain, Other      Endocrine:  Denies: Polydipsia, Polyuria, Heat/cold intolerance, Abnorml     menstrual pattern, Diabetes, Other      Eyes:  Denies: Blurred vision, Vision Changes, Other      Ears, nose, mouth, throat:  Denies: Congestion, Dysphagia, Hearing Changes, Nose    Bleeding, Nasal Discharge, Throat pain, Tinnitus, Other      Cardiovascular:  Denies: Chest Pain, Exertional dyspnea, Peripheral Edema,     Palpitations, Syncope, Wake up Gasping for air, Orthopnea, Tachycardia, Other      Gastrointestinal:  Denies: Abdominal pain/cramping, Bloody stools, Constipation,    Diarrhea, Melena, Nausea, Vomiting, Other      Genitourinary:  Denies: Dysuria, Urinary frequency, Incontinence, Hematuria,     Urgency, Other      Musculoskeletal:  Denies: Joint Pain, Joint Stiffness, Joint Swelling, Myalgias,    Other      Hematologic/lymphatic:  DENIES: Lymphadenopathy, Bruising, Bleeding tendencies,     Other      Neurologic:  Denies: Headache, Numbness, Weakness, Seizures, Other      Psychiatric:  Denies: Anxiety, Appropriate Effect, Depression, Other      Sleep:  No: Excessive daytime sleep, Morning Headache?, Snoring, Insomnia?, Stop    breathing at sleep?, Other      Integumentary:  Denies: Rash, Dry skin, Skin Warm to Touch, Other            FAMILY/SOCIAL/MEDICAL HX      Surgical History:  Yes: Bladder Surgery (TURBT), Bowel Surgery (COLONOSCOPY),     Breast Surgery (DRAINAGE OF CYST, RIGHT), Carotid Stenosis (RIGHT CEA 1-12-05),     Head Surgery (LEOD ), Oral Surgery (TEETH EXTRACTIONS), Orthopedic Surgery (L4-    L5 LAMINECTOMY), Spinal Surgery (DISC REPAIR), Vascular Surgery (RIGHT CAROTID     ENDARTERECTOMY), Other Surgeries; No: AAA Repair, Abdominal Surgery,     Angioplasty, Appendectomy, Back Surgery, CABG, Cholecystectomy, Ear Surgery, Eye    Surgery, Hernia Surgery, Kidney Surgery, Nose Surgery, Prostatectomy, Rectal      Surgery, Testicular Surgery, Throat Surgery, Valve Replacement      Heart - Family Hx:  Father      Diabetes - Family Hx:  Aunt      Is Father Still Living?:  No      Is Mother Still Living?:  No       Family History:  Yes      Social History:  Tobacco Use; No Alcohol Use, No Recreational Drug use      Smoking status:  Former smoker (1 ppd for 30 years quit for 1 month)      Hysterectomy:  Yes      Anticoagulation Therapy:  No      Antibiotic Prophylaxis:  No      Medical History:  Yes: Arthritis (LEGS AND HIP AREA), Chronic Bronchitis/COPD,     Heart Attack (1986), Hemorrhoids/Rectal Prob (COLONOSCOPY LAST YEAR), High Blood    Pressure, Shortness Of Breath, Miscellaneous Medical/oth (OVERPRODUCTION OF RED     BLOOD CELLS); No: Alcoholism, Allergies, Anemia, Asthma, Blood Disease, Broken     Bones, Cataracts, Chemical Dependency, Chemotherapy/Cancer, Emphysema, Chronic     Liver Disease, Colon Trouble, Colitis, Diverticulitis, Congestive Heart Failu,     Deafness or Ringing Ears, Convulsions, Depression, Anxiety, Bipolar Disorder,     Diabetes, Epilepsy, Seizures, Forgetfullness, Glaucoma, Gall Stones, Gout, Head     Injury, Heart Murmur, Hepatitis, Hiatal Hernia, High Cholesterol, HIV (Do not     ask - volu, Jaundice, Kidney or Bladder Disease, Kidney Stones, Migrane     Headaches, Mitral Valve Prolapse, Night sweats, Phlebitis, Psychiatric Care,     Reflux Disease, Rheumatic Fever, Sexually Transmitted Dis, Sinus Trouble, Skin     Disease/Psoriais/Ecz, Stroke, Thyroid Problem, Tuberculosis or Pos TB Te      Psychiatric History      None            PREVENTION      Hx Influenza Vaccination:  No      Date Influenza Vaccine Given:  Oct 1, 2018      Influenza Vaccine Declined:  Yes      2 or More Falls Past Year?:  No      Fall Past Year with Injury?:  No      Hx Pneumococcal Vaccination:  Yes      Encouraged to follow-up with:  PCP regarding preventative exams.      Chart initiated by      Nicole Evangelista MA             ALLERGIES/MEDICATIONS      Allergies:        Coded Allergies:             PENICILLINS (Verified  Allergy, Mild, RASH, 10/17/18)      Medications    Last Reconciled on 10/17/18 16:59 by JASMEET WHITESIDE MD      Albuterol/Ipratropium (Duoneb) 3 Ml Ampul.neb      3 ML INH RTQ4H, #180 NEB 3 Refills         Prov: Jasmeet Whiteside         10/15/18       Beclomethasone Dipropionate (Qvar 40 Redihaler 10.6 GM) 10.6 Gm Hfa.aeroba      2 PUFF INH RTBID, #1 INH 3 Refills         Prov: Jasmeet Whiteside         6/27/18       Tiotropium Bromide (Spiriva Respimat 2.5 mcg/Puff) 4 Gm Mist.inhal      2 PUFFS INH RTQDAY, #1 MDI 9 Refills         Prov: Jasmeet Whiteside         6/27/18       Budesonide/Formoterol Fumarate (Symbicort 160/4.5 Mcg) 10.2 Gm Inh      2 PUFF INH BID, #1 INH 9 Refills         Prov: Jasmeet Whiteside         6/27/18       Fexofenadine Hcl (Fexofenadine Hcl) 180 Mg Tablet      180 MG PO QDAY, #30 TAB 9 Refills         Prov: Jasmeet Whiteside         11/7/17       Montelukast Sodium (Singulair*) 10 Mg Tab      10 MG PO HS, #30 TAB 9 Refills         Prov: Jasmeet Whiteside         11/7/17       Potassium Chloride (KCl*) 20 Meq/15 Ml Solution      20 MEQ PO BID, BOTTLE         Reported         7/26/17       Magnesium Oxide (Magnesium Oxide*) 400 Mg Tablet      400 MG PO BID, #60 TAB 0 Refills         Reported         7/26/17       LORazepam (LORazepam) 0.5 Mg Tablet      0.5 MG PO Q8H, TAB         Reported         7/26/17       Solifenacin (Vesicare*) 5 Mg Tab      10 MG PO QDAY, TAB         Reported         7/26/17       Apixaban (Eliquis) 2.5 Mg Tablet      2.5 MG PO BID for 30 Days, #60 TAB         Reported         7/26/17       Metoprolol Succinate (Metoprolol Succinate*) 50 Mg Tab.er.24h      25 MG PO QDAY, #15 TAB.SR.24H 0 Refills         Reported         7/26/17       Spironolactone (Spironolactone*) 25 Mg Tablet      25 MG PO MACKENZIE COULTER, #30 TAB 0 Refills         Reported         2/8/16       Alendronate Sodium (Alendronate) 70 Mg Tablet       70 MG PO Kidd, #4 TAB         Reported         7/24/15       Atorvastatin (Atorvastatin) 40 Mg Tab      40 MG PO HS, #30 TAB 0 Refills         Reported         1/14/15       Calcium Carbonate/Vitamin D3 (Calcium 600 With Vit D Tab) 1 Each Tablet      1 TAB PO QDAY         Reported         6/13/11       Aspirin (Aspirin Low-Strength 81 Mg) 81 Mg Tab      1 TAB PO QDAY, TAB 0 Refills         Reported         7/22/09       Amlodipine/Benazepril Hcl (Amlodipine/Benazepril 5/20 Mg) 1 Cap Cap      1 EACH PO QAM, 0 Refills         Reported         7/22/09      Current Medications      Current Medications Reviewed 10/17/18            EXAM      CONSTITUTIONAL: Pleasant elderly female in no acute distress,  normal     conversant.       EYES : Pink conjunctive, no ptosis, PERRL.       ENMT : Nose and ears appear normal, normal dentition, mild posterior pharyngeal     wall erythema. Mallampati classification II, no sinus tenderness.       Neck: Nontender, no masses, no thyromegaly, no nodules.      Resp : Bilateral significant diminished breath sounds, resonant to percussion     bilaterally, bilateral expiratory wheezing, no crackles, scattered rhonchi.      CVS  : No carotid bruits, s1s2 nl, RRR, no murmur, rubs or gallop, no peripheral    edema       Chest wall: Normal rise with inspiration, nontender on palpation. Increased AP     diameter.       GI   : Abdomen soft, with no masses, no hepatosplenomegaly, no hernias, BS+      MSK  : Normal gait and station, no digital cyanosis or clubbing       Skin : No rashes, ulcerations or lesions, normal turgor and temperature      Neuro: CN II - XII intact, no sensory deficits, DTRs intact and symmetrical, no     motor weakness      Psych: Appropriate affect, A   Vitals      Vitals:             Height 5 ft 2 in / 157.48 cm           Weight 134 lbs 7 oz / 60.316055 kg           BSA 1.61 m2           BMI 24.6 kg/m2           Temperature 97.8 F / 36.56 C - Oral           Pulse 61            Respirations 16           Blood Pressure 134/72 Sitting, Right Arm           Pulse Oximetry 93%, Room air            REVIEW      Results Reviewed      PCCS Results Reviewed?:  Yes Prev Lab Results, Yes Prev Radiology Results, Yes     Previous [facility] Records      Lab Results      The patient's blood work from 2018 shows normal CBC with mild leukopenia     at 4000.      Radiographic Results               [facility]                PACS RADIOLOGY REPORT            Patient: LESTER CARL   Acct: #[id number]   Report: #[id number]            UNIT #: [id number]    DOS: 10/17/18 1434      INSURANCE:MEDICARE PART A   LOCATION:[facility]     : 1931            PROVIDERS      ADMITTING:     ATTENDING: Jasmeet Whiteside      FAMILY:  NONE,MD   ORDERING:  Jasmeet Whiteside         OTHER:    DICTATING:  Philip De Jesus MD            REQ #:[id number]   EXAM:CXR2 - CHEST 2V AP PA LAT      REASON FOR EXAM:        REASON FOR VISIT:  SOB            *******Signed******         PROCEDURE:   CHEST AP/PA AND LATERAL             COMPARISON:   [facility], CT, CHEST W/ CONTRAST, 2017, 15:35.     [place]       [facility], CR, CHEST PA/AP          INDICATIONS:   Persistent shortness of breath             FINDINGS:         The lungs are hyperexpanded consistent with emphysematous changes.  No acute     infiltrate or effusion       is seen.  Mild cardiomegaly is stable.             CONCLUSION:         1. Marked emphysematous changes      2. Mild cardiomegaly, unchanged              Philip De Jesus M.D.             Electronically Signed and Approved By: Philip De Jesus M.D. on 10/17/2018 at 15:04                           Until signed, this is an unconfirmed preliminary report that may contain      errors and is subject to change.                                              WURRO:      D:10/17/18 1504      PFT Results      [id number]  [id number]                                  [facility]                          [employer]                            [place]  [id number]               __________________________________________________________________________             Patient Name:                   Attending Physician:      Stacey Juarez M.D.             Patient Visit # MR #            Admit Date  Disch Date     Location      [id number]    [id number]      10/17/2017                 [facility]- -             Date of Birth      09/17/1931      __________________________________________________________________________      821 - DIAGNOSTIC REPORT             PULMONARY FUNCTION TEST             SPIROMETRY:      Spirometry shows moderately severe obstructive defect.      FEV1/FVC is 47%.      FEV1 is 8.67 L, 53% of predicted.      FVC is 1.44 L, 88% of predicted.      There is a significant response to bronchodilator administration.  FVC      increased from 1.44 L to 1.6 L, 17% increase.  FEV1 increased from 0.67 to      0.76 L, 13% increase.             LUNG VOLUMES:      Lung volumes show hyperinflation and air trapping.      Total lung capacity is 9.19 L, 223% of predicted.      Residual volume is 7.74 L, 373% of predicted.             DIFFUSION:      Diffusion capacity is severely decreased at 14% of predicted.             FLOW VOLUME LOOP:      Flow volume loop is suggestive of severe obstructive process.             CONCLUSION:      Low FEV1/FVC with low FEV1 and FVC with air trapping, hyperinflation, and low      diffusion capacity.  It suggests moderately severe obstructive airway      disease, likely emphysema.  Disproportionately low diffusion capacity could      suggest underlying secondary process, either interstitial lung disease or      pneumonitis.  There is some reversibility seen on pulmonary function test as      well.  Please correlate clinically.             To be  electronically signed in [employer]      [id number] JASMEET WHITESIDE M.D.             NK:[id number]      D:  10/19/2017 07:53      T:  10/19/2017 15:29      #[id number]             Until signed, this is an unconfirmed preliminary report that may contain      errors and is subject to change.                   10/23/17 1152  <Electronically signed by Jasmeet Whiteside MD>            Assessment      CHF (congestive heart failure)         Congestive heart failure, unspecified HF chronicity, unspecified heart        failure type - I50.9         Heart failure type: unspecified         Heart failure chronicity: unspecified            Shortness of breath - R06.02            Notes      Discontinued Medications      * Budesonide/Formoterol Fumarate (Symbicort 160/4.5 Mcg) 10.2 GM INH: 2 PUFF INH      BID #1      * MDI-Albuterol (Ventolin HFA*) 8 GM HFA.AER.AD: 2 PUFFS INH Q4-6H PRN DYSPNEA       #1      * Beclomethasone Dipropionate (QVAR 80 Mcg) 8.7 GM AER.W.ADAP: 1 PUFFS INH BID       #1      * TIOTROPIUM BROMIDE (Spiriva Respimat 2.5 mcg/Puff) 4 GM MIST.INHAL: 2 PUFFS       INH RTQDAY #1      * predniSONE* 20 MG TABLET: 40 MG PO QDAY #10      New Diagnostics      * CBC, Month         Dx: CHF (congestive heart failure) - I50.9      * Comp Metabolic Panel, Month         Dx: CHF (congestive heart failure) - I50.9      * Chest 2 View, Week         Dx: Shortness of breath - R06.02      * 1 3 BETA D GLUCAN FUNGIT BDGLU, Routine         Dx: Shortness of breath - R06.02      * Immunoglobulin  E (I, Week         Dx: Shortness of breath - R06.02      PLAN:      The patient is an 87 year old female with a history of smoking in the past who     has moderately severe COPD and asthma overlap.              1. Chronic obstructive pulmonary disease/asthma overlap. Continue with Symbicort    twice a day and Spiriva once daily and Qvar twice a day. Use albuterol as     needed.  I will check CMP, CBC and chest x-ray, beta 1 3D beta glucan and serum     IgE level. She  was aware that she was exposed to some mold recently and is     worried whether her symptoms are related to mold exposure. If her chest x-ray     shows any abnormality, it may need to be followed with CT scan of the chest.              2. Obstructive sleep apnea. Sleep study showed mild REM sleep related apnea, but    otherwise it was stable, so she wanted to hold off on CPAP machine.            3.  Follow up in 3-4 months, earlier if needed.            Patient Education      Education resources provided:  Yes      Patient Education Provided:  Acute Bronchitis                 Disclaimer: Converted document may not contain table formatting or lab diagrams. Please see [employer] for the authenticated document.

## 2021-05-28 NOTE — PROGRESS NOTES
Patient: LESTER JUAREZ     Acct: [id number]     Report: #[id number]  UNIT #: [id number]     : 1931    Encounter Date:2021  PRIMARY CARE: RACHEL MUNOZ  ***Signed***  --------------------------------------------------------------------------------------------------------------------  History of Present Illness      Chief Complaint: 4 Month f/u            Lester Juarez is presenting for evaluation via Telehealth visit. Verbal consent     obtained before beginning visit.            PAST MEDICAL HISTORY/OVERVIEW OF PATIENT SYMPTOMS            Symptoms: SOA            HX: COPD, Respiratory failure             Flu vaccine: not current            Pneumonia vaccine: current            Provider spent 15 minutes with the patient during telehealth visit.            The following staff were present during this visit: Dr. Jasmeet Whiteside, Christel Aguilar     CMA             The patient is a 89 year old female with history of asthma chronic obstructive     pulmonary disease overlap syndrome, chronic hypoxic respiratory failure,     elevated IgA, history of mold exposure and tobacco abuse of cigarettes in r    emission.  She is currently on Stiolto and budesonide and uses albuterol inhaler    and nebulizer s2-3 times a day. She has no fever or chills, no nausea or     vomiting, no chest pain or chest tightness. No changes in weight or appetite.     She does not need any refills on her medications and overall she is doing well.             Physical exam is deferred due to Telehealth visit.            [id number]  [id number]                                 [facility]                          Health Information Management Services                            [place], [place]  [address]               __________________________________________________________________________             Patient Name:                   Attending Physician:      Lester Juarez M.D.              Patient Visit # MR #            Admit Date  Disch Date     Location      [id number]    [id number]      10/17/2017                 [facility] -             Date of Birth      09/17/1931      __________________________________________________________________________      821 - DIAGNOSTIC REPORT             PULMONARY FUNCTION TEST             SPIROMETRY:      Spirometry shows moderately severe obstructive defect.      FEV1/FVC is 47%.      FEV1 is 8.67 L, 53% of predicted.      FVC is 1.44 L, 88% of predicted.      There is a significant response to bronchodilator administration.  FVC      increased from 1.44 L to 1.6 L, 17% increase.  FEV1 increased from 0.67 to      0.76 L, 13% increase.             LUNG VOLUMES:      Lung volumes show hyperinflation and air trapping.      Total lung capacity is 9.19 L, 223% of predicted.      Residual volume is 7.74 L, 373% of predicted.             DIFFUSION:      Diffusion capacity is severely decreased at 14% of predicted.             FLOW VOLUME LOOP:      Flow volume loop is suggestive of severe obstructive process.             CONCLUSION:      Low FEV1/FVC with low FEV1 and FVC with air trapping, hyperinflation, and low      diffusion capacity.  It suggests moderately severe obstructive airway      disease, likely emphysema.  Disproportionately low diffusion capacity could      suggest underlying secondary process, either interstitial lung disease or      pneumonitis.  There is some reversibility seen on pulmonary function test as      well.  Please correlate clinically.             To be electronically signed in [facility]      [id number] JASMEET WHITESIDE M.D.             NK:ghanshyam      D:  10/19/2017 07:53      T:  10/19/2017 15:29      #[id number]             Until signed, this is an unconfirmed preliminary report that may contain      errors and is subject to change.                   10/23/17 1152  <Electronically signed by Jasmeet Whiteside MD>                                     [facility]  [facility]                PACS RADIOLOGY REPORT            Patient: LESTER CARL   Acct: #[id number]   Report: #[id number]            UNIT #: [id number]    DOS: 19 2337      INSURANCE:MEDICARE PART A   LOCATION:ER     : 1931            PROVIDERS      ADMITTING:     ATTENDING:       FAMILY:     ORDERING:  Tima Larose         OTHER:    DICTATING:  Louis Cruz MD            REQ #:[id number]   EXAM:CXR1 - CHEST 1 View AP PA      REASON FOR EXAM:  Shortness of Breath      REASON FOR VISIT:  SOA--HUMAIRA            *******Signed******         PROCEDURE:   CHEST AP/PA SINGLE VIEW             COMPARISON:   [facility], , CHEST AP/PA 1 VIEW, 2019,     19:23.             INDICATIONS:   Shortness of Breath             FINDINGS:         Cardiac silhouette remains enlarged.  The lungs are grossly clear.  No     pneumothorax or large       pleural effusion is seen.             CONCLUSION:         1. Enlarged cardiac silhouette.      2. No definite acute cardiopulmonary abnormality.              LOUIS CRUZ MD             Electronically Signed and Approved By: LOUIS CRUZ MD on 2019 at 0:01                           Until signed, this is an unconfirmed preliminary report that may contain      errors and is subject to change.                                              BARLA:      D:19 0001            Allergies and Medications      Allergies:        Coded Allergies:             PENICILLINS (Verified  Allergy, Mild, RASH, 21)      Medications    Last Reconciled on 21 17:42 by NEHEMIAS WHITESIDE MD      Albuterol/Ipratropium (Duoneb) 3 Ml Ampul.neb      3 ML INH RTQ6H for 30 Days, #120 NEB 5 Refills         Prov: Nehemias Whiteside         21       Neb-Budesonide (Budesonide) 0.5 Mg/2 Ml Ampul.neb      0.5 MG INH RTBID for 30 Days, #60 NEB 7 Refills         Prov: Nehemias Whiteside         21        Tiotropium Br/Olodaterol HCl (Stiolto Respimat Inhal Spray) 4 Gm Mist.inhal      2 PUFFS INH RTQDAY, #1 INH 9 Refills         [facility]         9/23/20       MDI-Albuterol (Ventolin HFA) 8 Gm Hfa.aer.ad      2 PUFFS INH Q4-6H PRN for DYSPNEA, #1 INH 6 Refills         Prov: [facility]         9/23/20       Montelukast Sodium (Singulair*) 10 Mg Tab      10 MG PO HS, #30 TAB 11 Refills         Prov: [facility]         9/23/20       Solifenacin (Vesicare*) 5 Mg Tab      10 MG PO QDAY, #30 TAB 0 Refills         Prov: Rhys Park         8/15/19       guaiFENesin LA (Humibid La) 600 Mg Tab.er.12h      1200 MG PO BID, #20 TAB.ER 0 Refills         Prov: Rhys Park         8/15/19       Metoprolol Succinate (Metoprolol Succinate) 25 Mg Tab.er.24h      12.5 MG PO QDAY, #15 TAB 0 Refills         Reported         8/13/19       Potassium Chloride Liquid (Potassium Chloride Liquid) 20 Meq/15 Ml Solution      20 MEQ PO BID, BOTTLE         Reported         7/26/17       Magnesium Oxide (Magnesium Oxide*) 400 Mg Tablet      400 MG PO BID, #60 TAB 0 Refills         Reported         7/26/17       Apixaban (Eliquis) 2.5 Mg Tablet      2.5 MG PO BID for 30 Days, #60 TAB         Reported         7/26/17       Spironolactone (Spironolactone*) 25 Mg Tablet      25 MG PO PC MARYLIN, #30 TAB 0 Refills         Reported         2/8/16       Alendronate Sodium (Alendronate) 70 Mg Tablet      70 MG PO Kidd, #4 TAB         Reported         7/24/15       Atorvastatin (Atorvastatin) 40 Mg Tab      40 MG PO HS, #30 TAB 0 Refills         Reported         1/14/15       Aspirin (Aspirin Low-Strength 81 Mg) 81 Mg Tab      1 TAB PO QDAY, TAB 0 Refills         Reported         7/22/09       amLODIPine-Benazepril 5-20 Mg (amLODIPine-Benazepril 5-20 Mg) 1 Cap Cap      1 EACH PO QAM, 0 Refills         Reported         7/22/09            Assessment      Shortness of Air  R06.02            Plan      Orders:  Phone  Paul 11-20 mi [phone]      Instructions      * Chronic conditions reviewed and taken in consideration for today's treatment       plan.      * Plan Of Care: ()      * Patient instructed to seek medical attention urgently for new or worsening       symptoms.      * Patient was educated/instructed on their diagnosis, treatment and medications       today.      * Recommend self monitoring. Instructions given.      * Recommend self quarantine for 14 days.      * Recommend self quarantine until without fever for 72 hours without using fever       reducing medications.      * Recommends over the counter medications for symptom management.            PLAN:      The patient is a 89 female with asthma chronic obstructive pulmonary disease     overlap syndrome, chronic hypoxic respiratory failure, elevated IgA, history of     mold exposure and tobacco abuse of cigarettes in remission.             1. Asthma chronic obstructive pulmonary disease overlap syndrome. Continue with     Stiolto and budesonide twice daily. Use albuterol as needed. She is overall     stable. She should keep herself as active as possible. She has not taken a flu     vaccine as she has not been out and about.       2. Follow up in 4-6 months earlier if needed.            Electronically signed by Jasmeet Whiteside  02/09/2021 19:11       Disclaimer: Converted document may not contain table formatting or lab diagrams. Please see [employer] for the authenticated document.

## 2021-05-28 NOTE — PROGRESS NOTES
Patient: LESTER CARL     Acct: [id number]     Report: #[id number]  UNIT #: [id number]     : 1931    Encounter Date:2019  PRIMARY CARE: RACHEL MUNOZ  ***Signed***  --------------------------------------------------------------------------------------------------------------------  Chief Complaint      Encounter Date      Aug 6, 2019            Primary Care Provider      RACHEL MUNOZ            Referring Provider      SELF,REFERRED            Patient Complaint      Patient is complaining of      Patient here today for 6 month follow up            VITALS      Height 62 in / 157.48 cm      Weight 140 lbs  / 63.919199 kg      BSA 1.64 m2      BMI 25.6 kg/m2      Temperature 98.1 F / 36.72 C - Oral      Pulse 75      Respirations 14      Blood Pressure 127/69 Sitting, Left Arm      Pulse Oximetry 94%, room air      Initial Exhaled Nitrous Oxide      Date:  2019      Exhaled Nitrous Oxide Results:  64 ([phone])            HPI      The patient is a very pleasant 87 year old  female patient of     Dr. Whiteside's former smoker with a history of moderately severe chronic     obstructive pulmonary disease with asthma overlap syndrome. She also has a     history of mold exposure in the home, previously had an elevated IgE level of     937 in 2018. At that time she had negative fungal testing including beta    D glucan testing that was negative ordered by Dr. Whiteside. She still has mold in     her home and is trying to have is repaired but it is an expensive repair process    and she concerned about that. She overall feels like her breathing has been     stable, she denies any increased dyspnea, coughing or wheezing, denies     hemoptysis, fever or chills. She is doing well on Symbicort 160, Spiriva     Respimat 2.5 mcg and qvar 80.  She feels like they all help her. She uses her     albuterol once a day, sometimes not at all.             I reviewed her Review of  Systems, medical, surgical and family history and agree    with those as entered.      Copies To:   Jasmeet Whiteside      Constitutional:  Denies: Fatigue, Fever, Weight gain, Weight loss, Chills,     Insomnia, Other      Respiratory/Breathing:  Denies: Shortness of air, Wheezing, Cough, Hemoptysis,     Pleuritic pain, Other      Endocrine:  Denies: Polydipsia, Polyuria, Heat/cold intolerance, Abnorml men    strual pattern, Diabetes, Other      Eyes:  Denies: Blurred vision, Vision Changes, Other      Ears, nose, mouth, throat:  Denies: Congestion, Dysphagia, Hearing Changes, Nose    Bleeding, Nasal Discharge, Throat pain, Tinnitus, Other      Cardiovascular:  Denies: Chest Pain, Exertional dyspnea, Peripheral Edema,     Palpitations, Syncope, Wake up Gasping for air, Orthopnea, Tachycardia, Other      Gastrointestinal:  Denies: Abdominal pain/cramping, Bloody stools, Constipation,    Diarrhea, Melena, Nausea, Vomiting, Other      Genitourinary:  Denies: Dysuria, Urinary frequency, Incontinence, Hematuria,     Urgency, Other      Musculoskeletal:  Denies: Joint Pain, Joint Stiffness, Joint Swelling, Myalgias,    Other      Hematologic/lymphatic:  DENIES: Lymphadenopathy, Bruising, Bleeding tendencies,     Other      Neurologic:  Denies: Headache, Numbness, Weakness, Seizures, Other      Psychiatric:  Denies: Anxiety, Appropriate Effect, Depression, Other      Sleep:  No: Excessive daytime sleep, Morning Headache?, Snoring, Insomnia?, Stop    breathing at sleep?, Other      Integumentary:  Denies: Rash, Dry skin, Skin Warm to Touch, Other            FAMILY/SOCIAL/MEDICAL HX      Surgical History:  Yes: Bladder Surgery (TURBT), Bowel Surgery (COLONOSCOPY),     Breast Surgery (DRAINAGE OF CYST, RIGHT), Carotid Stenosis (RIGHT CEA 1-12-05),     Head Surgery (LEOD ), Oral Surgery (TEETH EXTRACTIONS), Orthopedic Surgery (L4-    L5 LAMINECTOMY), Spinal Surgery (DISC REPAIR), Vascular Surgery (RIGHT CAROTID      ENDARTERECTOMY), Other Surgeries; No: AAA Repair, Abdominal Surgery,     Angioplasty, Appendectomy, Back Surgery, CABG, Cholecystectomy, Ear Surgery, Eye    Surgery, Hernia Surgery, Kidney Surgery, Nose Surgery, Prostatectomy, Rectal     Surgery, Testicular Surgery, Throat Surgery, Valve Replacement      Heart - Family Hx:  Father      Diabetes - Family Hx:  Aunt      Is Father Still Living?:  No      Is Mother Still Living?:  No       Family History:  Yes      Social History:  Tobacco Use; No Alcohol Use, No Recreational Drug use      Smoking status:  Former smoker (1 ppd for 30 years quit for 1 month)      Hysterectomy:  Yes      Anticoagulation Therapy:  No      Antibiotic Prophylaxis:  No      Medical History:  Yes: Arthritis (LEGS AND HIP AREA), Chronic Bronchitis/COPD,     Heart Attack (1986), Hemorrhoids/Rectal Prob (COLONOSCOPY LAST YEAR), High Blood    Pressure, Shortness Of Breath, Miscellaneous Medical/oth (OVERPRODUCTION OF RED     BLOOD CELLS); No: Alcoholism, Allergies, Anemia, Asthma, Blood Disease, Broken     Bones, Cataracts, Chemical Dependency, Chemotherapy/Cancer, Emphysema, Chronic     Liver Disease, Colon Trouble, Colitis, Diverticulitis, Congestive Heart Failu,     Deafness or Ringing Ears, Convulsions, Depression, Anxiety, Bipolar Disorder,     Diabetes, Epilepsy, Seizures, Forgetfullness, Glaucoma, Gall Stones, Gout, Head     Injury, Heart Murmur, Hepatitis, Hiatal Hernia, High Cholesterol, HIV (Do not     ask - volu, Jaundice, Kidney or Bladder Disease, Kidney Stones, Migrane     Headaches, Mitral Valve Prolapse, Night sweats, Phlebitis, Psychiatric Care,     Reflux Disease, Rheumatic Fever, Sexually Transmitted Dis, Sinus Trouble, Skin     Disease/Psoriais/Ecz, Stroke, Thyroid Problem, Tuberculosis or Pos TB Te      Psychiatric History      none            PREVENTION      Hx Influenza Vaccination:  Yes      Date Influenza Vaccine Given:  Oct 1, 2018      Influenza Vaccine Declined:  No       2 or More Falls Past Year?:  No      Fall Past Year with Injury?:  No      Hx Pneumococcal Vaccination:  Yes      Encouraged to follow-up with:  PCP regarding preventative exams.      Chart initiated by      Ann Marie Anne CMA            ALLERGIES/MEDICATIONS      Allergies:        Coded Allergies:             PENICILLINS (Verified  Allergy, Mild, RASH, 8/6/19)      Medications    Last Reconciled on 8/6/19 14:56 by FREDA LEYVA      Beclomethasone Dipropionate (Qvar 80 Redihaler 10.6 GM) 10.6 Gm Hfa.aeroba      1 PUFF INH RTBID, #1 INH 3 Refills         Prov: Jasmeet Whiteside         2/22/19       Albuterol/Ipratropium (Duoneb) 3 Ml Ampul.neb      3 ML INH RTQ4H, #180 NEB 3 Refills         Prov: Jasmeet Whiteside         2/18/19       MDI-Albuterol (Ventolin HFA) 8 Gm Hfa.aer.ad      2 PUFFS INH Q4-6H PRN for DYSPNEA, #1 INH 6 Refills         Prov: Jasmeet Whiteside         1/29/19       Montelukast Sodium (Singulair*) 10 Mg Tab      10 MG PO HS, #30 TAB 9 Refills         Prov: Jasmeet Whiteside         11/19/18       Beclomethasone Dipropionate (Qvar 40 Redihaler 10.6 GM) 10.6 Gm Hfa.aeroba      2 PUFF INH RTBID, #1 INH 3 Refills         Prov: Jasmeet Whiteside         6/27/18       Tiotropium Bromide (Spiriva Respimat 2.5 mcg/Puff) 4 Gm Mist.inhal      2 PUFFS INH RTQDAY, #1 MDI 9 Refills         Prov: Jasmeet Whiteside         6/27/18       Budesonide/Formoterol Fumarate (Symbicort 160/4.5 Mcg) 10.2 Gm Inh      2 PUFF INH BID, #1 INH 9 Refills         Prov: Jasmeet Whiteside         6/27/18       Fexofenadine Hcl (Fexofenadine Hcl) 180 Mg Tablet      180 MG PO QDAY, #30 TAB 9 Refills         Prov: Jasmeet Whiteside         11/7/17       Potassium Chloride (KCl*) 20 Meq/15 Ml Solution      20 MEQ PO BID, BOTTLE         Reported         7/26/17       Magnesium Oxide (Magnesium Oxide*) 400 Mg Tablet      400 MG PO BID, #60 TAB 0 Refills         Reported         7/26/17       LORazepam (LORazepam) 0.5 Mg Tablet      0.5 MG PO Q8H, TAB         Reported          7/26/17       Solifenacin (Vesicare*) 5 Mg Tab      10 MG PO QDAY, TAB         Reported         7/26/17       Apixaban (Eliquis) 2.5 Mg Tablet      2.5 MG PO BID for 30 Days, #60 TAB         Reported         7/26/17       Metoprolol Succinate (Metoprolol Succinate) 50 Mg Tab.er.24h      25 MG PO QDAY, #15 TAB.SR.24H 0 Refills         Reported         7/26/17       Spironolactone (Spironolactone*) 25 Mg Tablet      25 MG PO PC MARYLIN, #30 TAB 0 Refills         Reported         2/8/16       Alendronate Sodium (Alendronate) 70 Mg Tablet      70 MG PO Kidd, #4 TAB         Reported         7/24/15       Atorvastatin (Atorvastatin) 40 Mg Tab      40 MG PO HS, #30 TAB 0 Refills         Reported         1/14/15       Calcium Carbonate/Vitamin D3 (Calcium 600 With Vit D Tab) 1 Each Tablet      1 TAB PO QDAY         Reported         6/13/11       Aspirin (Aspirin Low-Strength 81 Mg) 81 Mg Tab      1 TAB PO QDAY, TAB 0 Refills         Reported         7/22/09       Amlodipine/Benazepril Hcl (Amlodipine/Benazepril 5/20 Mg) 1 Cap Cap      1 EACH PO QAM, 0 Refills         Reported         7/22/09      Current Medications      Current Medications Reviewed 8/6/19            EXAM      CONSTITUTIONAL: Pleasant  normal conversant.       EYES : Pink conjunctive, no ptosis, PERRL.       ENMT : Nose and ears appear normal, normal dentition, mild posterior pharyngeal     wall erythema, no sinus tenderness. Mallampati classification       Neck: Nontender, no masses, no thyromegaly, no nodules.      Resp : Grossly clear to auscultation, no wheezes, rhonchi or crackles     appreciated, normal work of breathing noted.        CVS  : No carotid bruits, s1s2 nl, RRR, no murmur, rubs or gallop, no peripheral    edema       Chest wall: Normal rise with inspiration, nontender on palpation.      GI   : Abdomen soft, with no masses, no hepatosplenomegaly, no hernias, BS+      MSK  : Normal gait and station, no digital cyanosis or clubbing       Skin  : No rashes, ulcerations or lesions, normal turgor and temperature      Neuro: CN II - XII intact, no sensory deficits, DTRs intact and symmetrical, no     motor weakness      Psych: Appropriate affect, A   Vitals      Vitals:             Height 62 in / 157.48 cm           Weight 140 lbs  / 63.680454 kg           BSA 1.64 m2           BMI 25.6 kg/m2           Temperature 98.1 F / 36.72 C - Oral           Pulse 75           Respirations 14           Blood Pressure 127/69 Sitting, Left Arm           Pulse Oximetry 94%, room air            REVIEW      Results Reviewed      PCCS Results Reviewed?:  Yes Prev Lab Results, Yes Prev Radiology Results, Yes     Previous Mecial Records            Assessment      Elevated IgE level - R76.8            Notes      Renewed Medications      * Budesonide/Formoterol Fumarate (Symbicort 160/4.5 Mcg) 10.2 GM INH: 2 PUFF INH      BID #1      * TIOTROPIUM BROMIDE (Spiriva Respimat 2.5 mcg/Puff) 4 GM MIST.INHAL: 2 PUFFS       INH RTQDAY #1      * Beclomethasone Dipropionate (Qvar 80 Redihaler 10.6 GM) 10.6 GM HFA.AEROBA: 1       PUFF INH RTBID #1      Discontinued Medications      * Beclomethasone Dipropionate (Qvar 40 Redihaler 10.6 GM) 10.6 GM HFA.AEROBA: 2       PUFF INH RTBID #1         Instructions: 80 mcg      New Diagnostics      * Immunoglobulin  E (I, 1 DAY         Dx: Elevated IgE level - R76.8      ASSESSMENT:       1. Chronic obstructive pulmonary disease/asthma overlap.       2. Tobacco abuse of cigarettes in remission.      3. Elevated IgE level.       4. History of mold exposure in the home.              PLAN:      1. At this time I will have the patient continue on Symbicort 160 2 puffs twice     daily, Spiriva Respimat 2.5 mcg 2 puffs once daily and qvar 80 2 puffs twice     daily. Continue albuterol or DuoNeb as needed. I have sent in refills today.       2. The patient still has mold exposure in her home, I will repeat an IgE level     now. If it is higher or remains  significantly elevated, additional fungal     testing can be done again.       3. Continue Singulair.       4. She has mild REM sleep apnea in the past but decision was made to hold off on    CPAP for now. She remains asymptomatic and would not like to use a CPAP at this     time.       5. Follow up in 6 months with Dr. Whiteside, sooner if needed.            Patient Education      Patient Education Provided:  COPD, How to use an Inhaler, How to use a Nebulizer      Time Spent:  > 50% /[facility]                 Disclaimer: Converted document may not contain table formatting or lab diagrams. Please see [employer] for the authenticated document.

## 2021-05-28 NOTE — PROGRESS NOTES
Patient: LESTER JUAREZ     Acct: [id number]     Report: #[id number]  UNIT #: [id number]     : 1931    Encounter Date:2020  PRIMARY CARE: RACHEL MUNOZ  ***Signed***  --------------------------------------------------------------------------------------------------------------------  TELEHEALTH NOTE      History of Present Illness      Chief Complaint: (2-3 month follow up/soa )            Lester Juarez is presenting for evaluation via Telehealth visit by phone. Verbal    consent obtained before beginning visit.            Provider spent 24 minutes with the patient during telehealth visit.            The following staff were present during the visit: (Jasmeet Whiteside MD and Jennifer Castillo ma)            The patient is a 88 year old female with chronic obstructive pulmonary disease     and asthma overlap, chronic hypoxic respiratory failure, elevated IgE, history     of mold exposure and tobacco abuse of cigarettes in remission  who presents for     Telehealth visit today. Since her last office visit she has been on budesonide     twice daily. She was supposed to switch spiriva to Stiolto and I had given her     samples. She did well on the samples but then did not call and she is currently     using spiriva and budesonide she is only on albuterol for rescue which she uses     regularly twice daily.  She has not been using it on as needed basis. She has no    significant changes in weight or appetite, no coughing up blood. She is trying     to stay home with the COVID-19 pandemic going on. She tries to keep herself as     active as possible.             Physical exam is deferred due to Telehealth visit.                            Past Med History      [id number]  [id number]                                 [facility]                          [facility] [employer] [facility]                            [place], [place]  [address]                __________________________________________________________________________             Patient Name:                   Attending Physician:      Stacey Juarez M.D.             Patient Visit # MR #            Admit Date  Disch Date     Location      [id number]    [id number]      10/17/2017                 [facility] -             Date of Birth      09/17/1931      __________________________________________________________________________      821 - DIAGNOSTIC REPORT             PULMONARY FUNCTION TEST             SPIROMETRY:      Spirometry shows moderately severe obstructive defect.      FEV1/FVC is 47%.      FEV1 is 8.67 L, 53% of predicted.      FVC is 1.44 L, 88% of predicted.      There is a significant response to bronchodilator administration.  FVC      increased from 1.44 L to 1.6 L, 17% increase.  FEV1 increased from 0.67 to      0.76 L, 13% increase.             LUNG VOLUMES:      Lung volumes show hyperinflation and air trapping.      Total lung capacity is 9.19 L, 223% of predicted.      Residual volume is 7.74 L, 373% of predicted.             DIFFUSION:      Diffusion capacity is severely decreased at 14% of predicted.             FLOW VOLUME LOOP:      Flow volume loop is suggestive of severe obstructive process.             CONCLUSION:      Low FEV1/FVC with low FEV1 and FVC with air trapping, hyperinflation, and low      diffusion capacity.  It suggests moderately severe obstructive airway      disease, likely emphysema.  Disproportionately low diffusion capacity could      suggest underlying secondary process, either interstitial lung disease or      pneumonitis.  There is some reversibility seen on pulmonary function test as      well.  Please correlate clinically.             To be electronically signed in [employer]      [id number] NEHEMIAS CENTENO M.D.             NK:ghanshyam      D:  10/19/2017 07:53      T:  10/19/2017 15:29      #[id number]             Until signed, this is an  unconfirmed preliminary report that may contain      errors and is subject to change.                   10/23/17 1152  <Electronically signed by Jasmeet Whiteside MD>                        [facility]                PACS RADIOLOGY REPORT            Patient: LESTER CARL   Acct: #[id number]   Report: #[id number]            UNIT #: [id number]    DOS: 19 0841      INSURANCE:MEDICARE PART A   LOCATION:[place] [address]   : 1931            PROVIDERS      ADMITTING:  Rhys Park   ATTENDING: Rhys Park      FAMILY:  RACHEL MUNOZ   ORDERING:  AI ROBBINS         OTHER:    DICTATING:  YAZMIN DIAL MD            REQ #:[id number]   EXAM:[facility] - CT CHEST without CONTRAST      REASON FOR EXAM:  hypoxemia, copd      REASON FOR VISIT:  AECOPD, HYPERCAPNIC RESP FAILURE            *******Signed******         PROCEDURE:   CT CHEST WITHOUT CONTRAST             COMPARISON:   [facility], CT, CHEST W/ CONTRAST, 2017, 15:35.             INDICATIONS:   Hypoxemia, COPD             TECHNIQUE:   CT images were created without the administration of contrast     material.               PROTOCOL:     Standard imaging protocol performed                RADIATION:     DLP: 447 mGy*cm          Automated exposure control was utilized to minimize radiation dose.              FINDINGS:         Visualized lower neck without acute abnormality.  Lack of contrast limits ass    essment of mediastinum       and vasculature.  Heart size top normal.  Extensive coronary calcifications.      The ascending aorta       measures up to 4.4 cm at the level of the main pulmonary artery, unchanged.  No     mediastinal, hilar,       or axillary adenopathy.  No pericardial or pleural effusion.  Main pulmonary     artery dilated up to       3.7 cm which is nonspecific but can be seen with pulmonary hypertension.             Trachea and mainstem bronchi patent.  No pneumothorax.   No focal consolidation.     Moderate       emphysema.  Small ground-glass 4 mm nodule in the left upper lobe is stable on     image 14. Small 4 mm       nodule abutting the left pulmonary fissure on image 40 likely intrapulmonary     lymph node, stable.        Visualized portions of the liver, spleen, adrenal glands without acute abnormal    ity.  There is a       nonobstructing 2 mm right upper pole renal calculus.  Exaggerated thoracic     kyphosis with multilevel       disc disease.  Height loss at the inferior endplate of T11 is unchanged.  No     acute fracture.             CONCLUSION:         1. No acute intrathoracic process.      2. Emphysema.      3. Coronary atherosclerotic disease.      4.  Additional chronic findings above.              YAZMIN DIAL MD             Electronically Signed and Approved By: YAZMIN DIAL MD on 8/13/2019 at 15:25                        Until signed, this is an unconfirmed preliminary report that may contain      errors and is subject to change.                                              YAZMINM:      D:08/13/19 1525      Overview of Symptoms      soa            Allergies/Medications      Allergies:        Coded Allergies:             PENICILLINS (Verified  Allergy, Mild, RASH, 5/22/20)      Medications    Last Reconciled on 5/22/20 09:04 by NEHEMIAS WHITESIDE MD      Albuterol Sulfate (Albuterol Sulfate) 1.25 Mg/3 Ml Vial.neb      1.25 MG INH RTTID for 30 Days, #90 NEB         Reported         5/22/20       MDI-Albuterol (Ventolin HFA) 8 Gm Hfa.aer.ad      2 PUFFS INH Q4-6H PRN for DYSPNEA, #1 INH 6 Refills         Prov: Nehemias Whiteside         5/4/20       Montelukast Sodium (Singulair*) 10 Mg Tab      10 MG PO HS, #30 TAB 11 Refills         Prov: Nehemias Whiteside         3/27/20       Neb-Budesonide (Budesonide) 0.5 Mg/2 Ml Ampul.neb      0.5 MG INH RTBID, #60 NEB 11 Refills         Prov: Marielle Kee PA-C         8/29/19       Tiotropium Bromide (Spiriva Respimat 2.5 mcg/Puff) 4 Gm  Mist.inhal      2 PUFFS INH QDAY, #1 MDI 11 Refills         Prov: Marielle Kee PA-C         8/29/19       Solifenacin (Vesicare*) 5 Mg Tab      10 MG PO QDAY, #30 TAB 0 Refills         Prov: Rhys Park         8/15/19       guaiFENesin LA (Humibid La) 600 Mg Tab.er.12h      1200 MG PO BID, #20 TAB.ER 0 Refills         Prov: Rhys Park         8/15/19       Metoprolol Succinate (Metoprolol Succinate) 25 Mg Tab.er.24h      12.5 MG PO QDAY, #15 TAB 0 Refills         Reported         8/13/19       Potassium Chloride Liquid (Potassium Chloride Liquid) 20 Meq/15 Ml Solution      20 MEQ PO BID, BOTTLE         Reported         7/26/17       Magnesium Oxide (Magnesium Oxide*) 400 Mg Tablet      400 MG PO BID, #60 TAB 0 Refills         Reported         7/26/17       Apixaban (Eliquis) 2.5 Mg Tablet      2.5 MG PO BID for 30 Days, #60 TAB         Reported         7/26/17       Spironolactone (Spironolactone*) 25 Mg Tablet      25 MG PO PC MARYLIN, #30 TAB 0 Refills         Reported         2/8/16       Alendronate Sodium (Alendronate) 70 Mg Tablet      70 MG PO Kidd, #4 TAB         Reported         7/24/15       Atorvastatin (Atorvastatin) 40 Mg Tab      40 MG PO HS, #30 TAB 0 Refills         Reported         1/14/15       Aspirin (Aspirin Low-Strength 81 Mg) 81 Mg Tab      1 TAB PO QDAY, TAB 0 Refills         Reported         7/22/09       amLODIPine-Benazepril 5-20 Mg (amLODIPine-Benazepril 5-20 Mg) 1 Cap Cap      1 EACH PO QAM, 0 Refills         Reported         7/22/09            Plan/Instructions      Ambulatory Assessment/Plan:        Notes      New Medications      * Albuterol Sulfate 1.25 MG/3 ML VIAL.NEB: 1.25 MG INH RTTID 30 Days #90         Instructions: DIAGNOSIS CODE REQUIRED PRIOR TO PRESCRIBING.      * Tiotropium Br/Olodaterol HCl (Stiolto Respimat Inhal [place]) 4 GM MIST.INHAL: 2      PUFFS INH RTQDAY #1      Discontinued Medications      * predniSONE 20 MG TABLET: 40 MG PO QDAY #10      * REVEFENACIN  (YUPELRI) 175 MCG/3 ML NEBU: 175 MCG INH RTQDAY 30 Days #30      * TIOTROPIUM BROMIDE (Spiriva Respimat 2.5 mcg/Puff) 4 GM MIST.INHAL: 2 PUFFS       INH QDAY #1      * ARFORMOTEROL TARTRATE (Brovana) 15 MCG/2 ML VIAL.NEB: 15 MCG INH RTBID #60         Instructions: DIAGNOSIS CODE REQUIRED PRIOR TO PRESCRIBING.      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      * Patient instructed to seek medical attention urgently for new or worsening       symptoms.      * Patient was educated/instructed on their diagnosis, treatment and medications       prior to discharge from the clinic today.            PLAN:      The patient is a 88 year old female with asthma chronic obstructive pulmonary     disease overlap syndrome, chronic hypoxic respiratory failure, elevated IgE,     history of mold exposure and tobacco abuse of cigarettes in remission.             1. Asthma chronic obstructive pulmonary disease overlap syndrome. Switch spiriva     to stiolto, she is currently spiriva. I will give her a sample if needed. I     have sent the script to her pharmacy.       2. Continue budesonide nebulizer twice daily and albuterol on only as needed     basis. She is overall doing okay and stable.       3. I will follow up with her in 3-4 months earlier if needed.      Codes:  Phone Eval 21-30 mi [id number]            Electronically signed by Jasmeet Whiteside  06/15/2020 11:51       Disclaimer: Converted document may not contain table formatting or lab diagrams. Please see [employer] for the authenticated  document.

## 2021-05-28 NOTE — PROGRESS NOTES
Patient: LESTER CARL     Acct: [id number]     Report: #[id number]  UNIT #: [id number]     : 1931    Encounter Date:2018  PRIMARY CARE: RACHEL MUNOZ  ***Signed***  --------------------------------------------------------------------------------------------------------------------  Chief Complaint      Encounter Date      2018            Primary Care Provider      RACHEL MUNOZ            Referring Provider      SELF,REFERRED PATIENT            Patient Complaint      Patient is complaining of      follow up            VITALS      Height 5 ft 2 in / 157.48 cm      Weight 130 lbs 4 oz / 59.88418 kg      BSA 1.62 m2      BMI 23.8 kg/m2      Temperature 98.2 F / 36.78 C - Oral      Pulse 62      Respirations 16      Blood Pressure 118/60 Sitting, Right Arm      Pulse Oximetry 92%, room air      Exhaled Nitrous Oxide Testin (18(61))            HPI      The patient is a 86 year old female with history of asthma, smoking in the past     and moderately severe chronic obstructive pulmonary disease.  She is here for     follow up.             Since her last office visit she had shortness of breath with activities. She     was supposed to be on Symbicort, Spiriva and Qvar but has only been using Qvar     and Spiriva. She has been using her rescue inhaler several times and DuoNeb 1-2     times per day. She is also on Singulair and fexofenadine, eliquis and     metoprolol. She has been having significant leg swelling for the past few days.      She has not had any problems lying flat. She does not have any problems with     urination or any changes in her stool or urine color. She has no swelling in     the belly, no chest pain, no abdominal pain, no nausea and vomiting. She had a     flat affect today and has not had any blood work.            ROS      Constitutional:  Complains of: Fatigue, Denies: Fever, Weight gain, Weight loss    , Chills, Insomnia, Other      Respiratory/Breathing:   Complains of: Shortness of air, Denies: Wheezing, Cough    , Hemoptysis, Pleuritic pain, Other      Endocrine:  Denies: Polydipsia, Polyuria, Heat/cold intolerance, Abnorml     menstrual pattern, Diabetes, Other      Eyes:  Denies: Blurred vision, Vision Changes, Other      Ears, nose, mouth, throat:  Denies: Mouth lesions, Thrush, Throat pain,     Hoarseness, Allergies/Hay Fever, Post Nasal Drip, Headaches, Recent Head Injury    , Nose Bleeding, Neck Stiffness, Thyroid Mass, Hearing Loss, Ear Fullness, Dry     Mouth, Nasal or Sinus Pain, Dry Lips, Nasal discharge, Nasal congestion, Other      Cardiovascular:  Denies: Palpitations, Syncope, Claudication, Chest Pain, Wake     up Gasping for air, Leg Swelling, Irregular Heart Rate, Cyanosis, Dyspnea on     Exertion, Other      Gastrointestinal:  Denies: Nausea, Constipation, Diarrhea, Abdominal pain,     Vomiting, Difficulty Swallowing, Reflux/Heartburn, Dysphagia, Jaundice, Bloating    , Melena, Bloody stools, Other      Genitourinary:  Denies: Urinary frequency, Incontinence, Hematuria, Urgency,     Nocturia, Dysuria, Testicular problems, Other      Musculoskeletal:  Denies: Joint Pain, Joint Stiffness, Joint Swelling, Myalgias    , Other      Hematologic/lymphatic:  DENIES: Lymphadenopathy, Bruising, Bleeding tendencies,     Other      Neurological:  Denies: Headache, Numbness, Weakness, Seizures, Other      Psychiatric:  Denies: Anxiety, Appropriate Effect, Depression, Other      Sleep:  No: Excessive daytime sleep, Morning Headache?, Snoring, Insomnia?,     Stop breathing at sleep?, Other      Integumentary:  Denies: Rash, Dry skin, Skin Warm to Touch, Other      Immunologic/Allergic:  Denies: Latex allergy, Seasonal allergies, Asthma,     Urticaria, Eczema, Other      Immunization status:  No: Up to date            FAMILY/SOCIAL/MEDICAL HX      Surgical History:  Yes: Bladder Surgery (TURBT), Bowel Surgery (COLONOSCOPY),     Breast Surgery (DRAINAGE OF CYST,  RIGHT), Carotid Stenosis (RIGHT CEA 1-12-05),     Head Surgery (LEOD ), Oral Surgery (TEETH EXTRACTIONS), Orthopedic Surgery (L4-    L5 LAMINECTOMY), Spinal Surgery (DISC REPAIR), Vascular Surgery (RIGHT CAROTID     ENDARTERECTOMY), Other Surgeries, No: AAA Repair, Abdominal Surgery, Angioplasty    , Appendectomy, Back Surgery, CABG, Cholecystectomy, Ear Surgery, Eye Surgery,     Hernia Surgery, Kidney Surgery, Nose Surgery, Prostatectomy, Rectal Surgery,     Testicular Surgery, Throat Surgery, Valve Replacement      Heart - Family Hx:  Father      Diabetes - Family Hx:  Aunt      Is Father Still Living?:  No      Is Mother Still Living?:  No       Family History:  Yes      Social History:  Tobacco Use, No Alcohol Use, No Recreational Drug use      Smoking status:  Former smoker (1 ppd for 30 years quit for 1 month)      Hysterectomy:  Yes      Anticoagulation Therapy:  No      Antibiotic Prophylaxis:  No      Medical History:  Yes: Arthritis (LEGS AND HIP AREA), Chronic Bronchitis/COPD,     Heart Attack (1986), Hemorrhoids/Rectal Prob (COLONOSCOPY LAST YEAR), High     Blood Pressure, Shortness Of Breath, Miscellaneous Medical/oth (OVERPRODUCTION     OF RED BLOOD CELLS), No: Alcoholism, Allergies, Anemia, Asthma, Blood Disease,     Broken Bones, Cataracts, Chemical Dependency, Chemotherapy/Cancer, Emphysema,     Chronic Liver Disease, Colon Trouble, Colitis, Diverticulitis, Congestive Heart     Failu, Deafness or Ringing Ears, Convulsions, Depression, Anxiety, Bipolar     Disorder, Diabetes, Epilepsy, Seizures, Forgetfullness, Glaucoma, Gall Stones,     Gout, Head Injury, Heart Murmur, Hepatitis, Hiatal Hernia, High Cholesterol,     HIV (Do not ask - volu, Jaundice, Kidney or Bladder Disease, Kidney Stones,     Migrane Headaches, Mitral Valve Prolapse, Night sweats, Phlebitis, Psychiatric     Care, Reflux Disease, Rheumatic Fever, Sexually Transmitted Dis, Sinus Trouble,     Skin Disease/Psoriais/Ecz, Stroke,  Thyroid Problem, Tuberculosis or Pos TB Te      Psychiatric History      none            PREVENTION      Hx Influenza Vaccination:  No      Date Influenza Vaccine Given:  Oct 1, 2016      Influenza Vaccine Declined:  Yes      2 or More Falls Past Year?:  No      Fall Past Year with Injury?:  No      Hx Pneumococcal Vaccination:  Yes      Encouraged to follow-up with:  PCP regarding preventative exams.      Chart initiated by      farida jean ma            ALLERGIES/MEDICATIONS      Allergies:        Coded Allergies:             PENICILLINS (Verified  Allergy, Mild, RASH, 6/27/18)      Medications    Last Reconciled on 6/27/18 10:53 by JASMEET WHITESIDE MD      Beclomethasone Dipropionate (Qvar Redihaler) 10.6 Gm Hfa.aeroba      2 PUFF INH RTBID, #1 INH 9 Refills         Prov: Jasmeet Whiteside         6/27/18       Tiotropium Bromide (Spiriva Respimat 1.25 mcg/puff) 4 Gm Mist.inhal      2 PUFFS INH RTQDAY, #1 INH 9 Refills         Prov: Jasmeet Whiteside         6/27/18       Budesonide/Formoterol Fumarate (Symbicort 160/4.5 Mcg) 10.2 Gm Inh      2 PUFF INH BID, #1 INH 9 Refills         Prov: Jasmeet Whiteside         6/27/18       predniSONE* (predniSONE*) 20 Mg Tablet      40 MG PO QDAY, #10 TAB 0 Refills         Prov: Jasmeet Whiteside         6/27/18       Tiotropium Bromide (Spiriva Respimat 2.5 mcg/Puff) 4 Gm Mist.inhal      2 PUFFS INH RTQDAY, #1 MDI 3 Refills         Prov: Jasmeet Whiteside         1/18/18       Fexofenadine Hcl (Fexofenadine Hcl) 180 Mg Tablet      180 MG PO QDAY, #30 TAB 9 Refills         Prov: Jasmeet Whiteside         11/7/17       Montelukast Sodium (Singulair*) 10 Mg Tab      10 MG PO HS, #30 TAB 9 Refills         Prov: Jasmeet Whiteside         11/7/17       Beclomethasone Dipropionate (QVAR 80 Mcg) 8.7 Gm Aer.w.adap      1 PUFFS INH BID, #1 INH 9 Refills         Prov: Jasmeet Whiteside         11/7/17       MDI-Albuterol (Ventolin HFA*) 8 Gm Hfa.aer.ad      2 PUFFS INH Q4-6H Y for DYSPNEA, #1 INH 6 Refills         Prov:  Jasmeet Whiteside         8/25/17       Budesonide/Formoterol Fumarate (Symbicort 160/4.5 Mcg) 10.2 Gm Inh      2 PUFF INH BID, #1 INH 9 Refills         Prov: Jasmeet Whiteside         8/25/17       Albuterol/Ipratropium (Duoneb) 3 Ml Ampul.neb      3 ML INH Q4H Y for SHORTNESS OF BREATH, #120 NEB 0 Refills         Prov: Rhys Park         7/28/17       Potassium Chloride (KCl*) 20 Meq/15 Ml Solution      20 MEQ PO BID, BOTTLE         Reported         7/26/17       Magnesium Oxide (Magnesium Oxide*) 400 Mg Tablet      400 MG PO BID, #60 TAB 0 Refills         Reported         7/26/17       LORazepam (LORazepam) 0.5 Mg Tablet      0.5 MG PO Q8H, TAB         Reported         7/26/17       Solifenacin (Vesicare*) 5 Mg Tab      10 MG PO QDAY, TAB         Reported         7/26/17       Apixaban (Eliquis) 2.5 Mg Tablet      2.5 MG PO BID for 30 Days, #60 TAB         Reported         7/26/17       Metoprolol Succinate (Metoprolol Succinate*) 50 Mg Tab.er.24h      25 MG PO QDAY, #15 TAB.SR.24H 0 Refills         Reported         7/26/17       Spironolactone (Spironolactone*) 25 Mg Tablet      25 MG PO PC MARYLIN, #30 TAB 0 Refills         Reported         2/8/16       Alendronate Sodium (Alendronate) 70 Mg Tablet      70 MG PO Kidd, #4 TAB         Reported         7/24/15       Atorvastatin (Atorvastatin) 40 Mg Tab      40 MG PO HS, #30 TAB 0 Refills         Reported         1/14/15       Calcium Carbonate/Vitamin D3 (Calcium 600 With Vit D Tab) 1 Each Tablet      1 TAB PO QDAY         Reported         6/13/11       Aspirin (Aspirin Low-Strength 81 Mg) 81 Mg Tab      1 TAB PO QDAY, TAB 0 Refills         Reported         7/22/09       Amlodipine/Benazepril Hcl (Amlodipine/Benazepril 5/20 Mg) 1 Cap Cap      1 EACH PO QAM, 0 Refills         Reported         7/22/09      Current Medications      Current Medications Reviewed 6/27/18            EXAM      CONSTITUTIONAL: Pleasant elderly female in no acute distress,  normal     conversant.        EYES : Pink conjunctive, no ptosis, PERRL.       ENMT : Nose and ears appear normal, normal dentition, mild posterior pharyngeal     wall erythema. Mallampati classification II, no sinus tenderness.       Neck: Nontender, no masses, no thyromegaly, no nodules.      Resp : Bilateral significant diminished breath sounds, resonant to percussion     bilaterally, bilateral expiratory wheezing, no crackles, scattered rhonchi.      CVS  : No carotid bruits, s1s2 nl, RRR, no murmur, rubs or gallop, no     peripheral edema       Chest wall: Normal rise with inspiration, nontender on palpation. Increased AP     diameter.       GI   : Abdomen soft, with no masses, no hepatosplenomegaly, no hernias, BS+      MSK  : Normal gait and station, no digital cyanosis or clubbing       Skin : No rashes, ulcerations or lesions, normal turgor and temperature      Neuro: CN II - XII intact, no sensory deficits, DTRs intact and symmetrical, no     motor weakness      Psych: Appropriate affect, A   Vtials      Vitals:             Height 5 ft 2 in / 157.48 cm           Weight 130 lbs 4 oz / 59.10578 kg           BSA 1.62 m2           BMI 23.8 kg/m2           Temperature 98.2 F / 36.78 C - Oral           Pulse 62           Respirations 16           Blood Pressure 118/60 Sitting, Right Arm           Pulse Oximetry 92%, room air            REVIEW      Results Reviewed      PCCS Results Reviewed?:  Yes Prev Lab Results, Yes Prev Radiology Results, Yes     [facility]      Lab Results      The patient's sleep study was reviewed and it showed REM associated sleep apnea     with apnea hypopnea index of 7.2.      Radiographic Results                     [facility]                PACS RADIOLOGY REPORT            Patient: LESTER CARL LANA   Acct: #[id number]   Report: #[id number]            UNIT #: [id number]    DOS: 07/26/17 1021      INSURANCE:MEDICARE PART A   LOCATION:[address] [place], [place] [address]    : 1931            PROVIDERS      ADMITTING:  Rhys Park   ATTENDING: Rhys Park      FAMILY:  RACHEL MUNOZ   ORDERING:  Rhys Park         OTHER:    DICTATING:  Austen Lyle MD            REYARA #:[id number]    CPT CODE:44251   EXAM:CHW - CT CHEST with CONTRAST      REASON FOR EXAM:  R/O PE      REASON FOR VISIT:  COPDE            *******Signed******               PROCEDURE:   CT CHEST W/ CONTRAST             COMPARISON:   [facility], CT, CHEST W/ CONTRAST, 2017, 21:19.             INDICATIONS:   R/O PE/COPD /SHORTNESS OF AIR             TECHNIQUE:   CT images were obtained with non-ionic intravenous contrast     material.               PROTOCOL:     Pulmonary embolism imaging protocol performed                RADIATION:     CTDIvol: 57.22mGy    DLP: 286mGy*cm          Automated exposure control was utilized to minimize radiation dose.       CONTRAST:   100cc Optiray 350 I.V.      LABS:     eGFR: >60ml/min/1.73m2             FINDINGS:         The pulmonary arteries are well-opacified. No filling defects are seen. There     are no findings to       suggest pulmonary thromboembolic disease.             The pulmonary artery trunk measures 3.4 cm in maximal diameter, and is     unchanged. This may be a       manifestation of pulmonary hypertension.             The heart remains slightly enlarged. Extensive coronary artery calcifications     are evident.             The ascending aorta measures 4.7 cm in maximal diameter. There are no findings     to suggest       dissection.             Lung window images reveal moderate emphysema. No airspace disease is evident.             Mild anterior wedging deformity of the inferior endplate of T11 may represent     Schmorl's node or old       compression fracture, this appears stable.             CONTINUED ON NEXT PAGE...             CONCLUSION:         CT scan of the chest with IV contrast demonstrating no findings to suggest      pulmonary thromboembolic       disease.             Moderate emphysema.             The main pulmonary artery measures 3.4 cm in maximal diameter, which may be a     manifestation of       pulmonary hypertension.             Aneurysmal dilatation of the ascending aorta measuring 4.7 cm.              TAMMY MINOR MD             Electronically Signed and Approved By: TAMMY MINOR MD on 7/26/2017 at 16:08                            Until signed, this is an unconfirmed preliminary report that may contain      errors and is subject to change.                                              COUST:      D:07/26/17 1608      PFT Results      [id number]  [id number]                                 [facility]                          [facility] [employer]                            [place], [place]  [address]               __________________________________________________________________________             Patient Name:                   Attending Physician:      Stacey Juarez M.D.             Patient Visit # MR #            Admit Date  Disch Date     Location      [id number]    [id number]      10/17/2017                 CVS- -             Date of Birth      09/17/1931      __________________________________________________________________________      821 - DIAGNOSTIC REPORT             PULMONARY FUNCTION TEST             SPIROMETRY:      Spirometry shows moderately severe obstructive defect.      FEV1/FVC is 47%.      FEV1 is 8.67 L, 53% of predicted.      FVC is 1.44 L, 88% of predicted.      There is a significant response to bronchodilator administration.  FVC      increased from 1.44 L to 1.6 L, 17% increase.  FEV1 increased from 0.67 to      0.76 L, 13% increase.             LUNG VOLUMES:      Lung volumes show hyperinflation and air trapping.      Total lung capacity is 9.19 L, 223% of predicted.      Residual volume is 7.74 L,  373% of predicted.             DIFFUSION:      Diffusion capacity is severely decreased at 14% of predicted.             FLOW VOLUME LOOP:      Flow volume loop is suggestive of severe obstructive process.             CONCLUSION:      Low FEV1/FVC with low FEV1 and FVC with air trapping, hyperinflation, and low      diffusion capacity.  It suggests moderately severe obstructive airway      disease, likely emphysema.  Disproportionately low diffusion capacity could      suggest underlying secondary process, either interstitial lung disease or      pneumonitis.  There is some reversibility seen on pulmonary function test as      well.  Please correlate clinically.             To be electronically signed in [employer]      [id number] JASMEET WHITESIDE M.D.             NK:ghanshyam      D:  10/19/2017 07:53      T:  10/19/2017 15:29      #[id number]             Until signed, this is an unconfirmed preliminary report that may contain      errors and is subject to change.                   10/23/17 1152  <Electronically signed by Jasmeet Whiteside MD>            Assessment      Asthma       Moderate persistent asthma with acute exacerbation - J45.41       Asthma severity: moderate       Asthma persistence: persistent       Asthma complication type: with acute exacerbation            HEREDIA (dyspnea on exertion) - R06.09            Notes      New Medications      * predniSONE* 20 MG TABLET: 40 MG PO QDAY #10      * Budesonide/Formoterol Fumarate (Symbicort 160/4.5 Mcg) 10.2 GM INH: 2 PUFF     INH BID #1      * Beclomethasone Dipropionate (Qvar 40 Redihaler 10.6 GM) 10.6 GM HFA.AEROBA: 2     PUFF INH RTBID #1      * TIOTROPIUM BROMIDE (Spiriva Respimat 2.5 mcg/Puff) 4 GM MIST.INHAL: 2 PUFFS     INH RTQDAY #1      * Beclomethasone Dipropionate (Qvar 40 Redihaler 10.6 GM) 10.6 GM HFA.AEROBA: 2     PUFF INH RTBID #1       Instructions: 80 mcg      Discontinued Medications      * Budesonide/Formoterol Fumarate (Symbicort 80/4.5 Mcg) 10.2 GM HFA.AER.AD: 2      PUFF INH RTBID #1      * TIOTROPIUM BROMIDE (Spiriva Respimat 2.5 mcg/Puff) 4 GM MIST.INHAL: 2 PUFFS     INH RTQDAY #1      New Diagnostics      * Probrain Natriuretic, Routine       Dx: Asthma - J45.909      * Comp Metabolic Panel, Month       Dx: HEREDIA (dyspnea on exertion) - R06.09      * Chest 2 View, Week       Dx: HEREDIA (dyspnea on exertion) - R06.09      PLAN:      The patient is an 86 year old female with a history of smoking in the past who     has moderately severe Chronic Obstructive Pulmonary Disease and likely has     asthma overlap.              1. Chronic obstructive pulmonary disease asthma overlap. Continue with     Symbicort and Spiriva once daily and Qvar. I wrote for Symbicort again as she     does not know if she is taking it. I gave her 5 day course of Prednisone to use     for now. I will check chest x-ray and basic metabolic panel given her     significant leg swelling. I will also check a comprehensive metabolic panel     now. She is on spironolactone and amlodipine which can cause her to have leg     swelling but she has been on that for a long time.             2. Obstructive sleep apnea. I checked a sleep study at the last office visit     which was reviewed with her today. Sleep study shows mild sleep apnea     especially in REM sleep but otherwise it was normal. Sleep hygiene was     discussed in detail.             3.  Follow up in 3-4 months, earlier if needed.            Patient Education      Education resources provided:  Yes      Patient Education Provided:  Acute Asthma, Acute Bronchitis, COPD                 Disclaimer: Converted document may not contain table formatting or lab diagrams. Please see [employer] for the authenticated document.

## 2021-05-28 NOTE — PROGRESS NOTES
Patient: LESTER CARL     Acct: [id number]     Report: #[id number]  UNIT #: [id number]     : 1931    Encounter Date:2019  PRIMARY CARE: RACHEL MUNOZ  ***Signed***  --------------------------------------------------------------------------------------------------------------------  Chief Complaint      Encounter Date      Aug 29, 2019            Primary Care Provider      RACHEL MUNOZ            Referring Provider      SELF,REFERRED            Patient Complaint      Patient is complaining of      Patient here today for [facility] follow up            VITALS      Height 62 in / 157.48 cm      Weight 140 lbs  / 63.727251 kg      BSA 1.64 m2      BMI 25.6 kg/m2      Temperature 98.1 F / 36.72 C - Oral      Pulse 71      Respirations 13      Blood Pressure 114/85 Sitting, Right Arm      Pulse Oximetry 94%, room air      Initial Exhaled Nitrous Oxide      Date:  2019      Exhaled Nitrous Oxide Results:  64 ([phone])            HPI      The patient is a very pleasant 87 year old  female patient of     Dr. Whiteside's also known to me from a previous office visit earlier this month.     She has a history of asthma chronic obstructive pulmonary disease overlap syndro    me, former smoker, elevated IgE level and history of mold exposure in the home.     [facility] presented to [facility] and was seen by Dr. Ellington on     19. At this time she was complaining of increased dyspnea, coughing and     had some respiratory distress, she initially required BiPAP.  She was treated     with steroids and had a CT scan of the chest that was grossly normal, it showed     stable emphysema. She had another IgE level checked that was still elevated at     780 and additional fungal testing was done during her hospital as she still had     mold exposure in her basement. Fungal testing all came back negative. The     patient gradually improved and was discharged home with an  additional 5 days of     prednisone which she has completed. She was on Symbicort and Spiriva Respimat     and was discharged home on Brovana and Pulmicort nebulizer which she has and is     using. She feels like they help. She was also discharged home on Yupelri instead    of spiriva but was unable to afford it, her copay was going to be $300-$400 so     she did not pick that up. She is overall feeling better and says she feels back     to her baseline. She denies any increased dyspnea, coughing or wheezing,     hemoptysis, fever or chills.             I reviewed her Review of Systems, medical, surgical and family history and agree    with those as entered.      Copies To:   Jasmeet Whiteside      Constitutional:  Denies: Fatigue, Fever, Weight gain, Weight loss, Chills,     Insomnia, Other      Respiratory/Breathing:  Complains of: Shortness of air; Denies: Wheezing, Cough,    Hemoptysis, Pleuritic pain, Other      Endocrine:  Denies: Polydipsia, Polyuria, Heat/cold intolerance, Abnorml     menstrual pattern, Diabetes, Other      Eyes:  Denies: Blurred vision, Vision Changes, Other      Ears, nose, mouth, throat:  Denies: Congestion, Dysphagia, Hearing Changes, Nose    Bleeding, Nasal Discharge, Throat pain, Tinnitus, Other      Cardiovascular:  Denies: Chest Pain, Exertional dyspnea, Peripheral Edema,     Palpitations, Syncope, Wake up Gasping for air, Orthopnea, Tachycardia, Other      Gastrointestinal:  Denies: Abdominal pain/cramping, Bloody stools, Constipation,    Diarrhea, Melena, Nausea, Vomiting, Other      Genitourinary:  Denies: Dysuria, Urinary frequency, Incontinence, Hematuria,     Urgency, Other      Musculoskeletal:  Denies: Joint Pain, Joint Stiffness, Joint Swelling, Myalgias,    Other      Hematologic/lymphatic:  DENIES: Lymphadenopathy, Bruising, Bleeding tendencies,     Other      Neurologic:  Denies: Headache, Numbness, Weakness, Seizures, Other      Psychiatric:  Denies: Anxiety,  Appropriate Effect, Depression, Other      Sleep:  No: Excessive daytime sleep, Morning Headache?, Snoring, Insomnia?, Stop    breathing at sleep?, Other      Integumentary:  Denies: Rash, Dry skin, Skin Warm to Touch, Other            FAMILY/SOCIAL/MEDICAL HX      Surgical History:  Yes: Bladder Surgery (TURBT), Bowel Surgery (COLONOSCOPY),     Breast Surgery (DRAINAGE OF CYST, RIGHT), Carotid Stenosis (RIGHT CEA 1-12-05),     Head Surgery (LEOD ), Oral Surgery (TEETH EXTRACTIONS), Orthopedic Surgery (L4-    L5 LAMINECTOMY), Spinal Surgery (DISC REPAIR), Vascular Surgery (RIGHT CAROTID     ENDARTERECTOMY), Other Surgeries; No: AAA Repair, Abdominal Surgery,     Angioplasty, Appendectomy, Back Surgery, CABG, Cholecystectomy, Ear Surgery, Eye    Surgery, Hernia Surgery, Kidney Surgery, Nose Surgery, Prostatectomy, Rectal     Surgery, Testicular Surgery, Throat Surgery, Valve Replacement      Heart - Family Hx:  Father      Diabetes - Family Hx:  Aunt      Is Father Still Living?:  No      Is Mother Still Living?:  No       Family History:  Yes      Social History:  Tobacco Use; No Alcohol Use, No Recreational Drug use      Smoking status:  Former smoker (1 ppd for 30 years quit for 1 month)      Hysterectomy:  Yes      Anticoagulation Therapy:  No      Antibiotic Prophylaxis:  No      Medical History:  Yes: Arthritis (LEGS AND HIP AREA), Chronic Bronchitis/COPD,     Congestive Heart Failu, Heart Attack (1986), Hemorrhoids/Rectal Prob     (COLONOSCOPY LAST YEAR), High Blood Pressure, Shortness Of Breath, Miscellaneous    Medical/oth (OVERPRODUCTION OF RED BLOOD CELLS); No: Alcoholism, Allergies,     Anemia, Asthma, Blood Disease, Broken Bones, Cataracts, Chemical Dependency,     Chemotherapy/Cancer, Emphysema, Chronic Liver Disease, Colon Trouble, Colitis,     Diverticulitis, Deafness or Ringing Ears, Convulsions, Depression, Anxiety,     Bipolar Disorder, Diabetes, Epilepsy, Seizures, Forgetfullness, Glaucoma, Gall      Stones, Gout, Head Injury, Heart Murmur, Hepatitis, Hiatal Hernia, High     Cholesterol, HIV (Do not ask - volu, Jaundice, Kidney or Bladder Disease, Kidney    Stones, Migrane Headaches, Mitral Valve Prolapse, Night sweats, Phlebitis,     Psychiatric Care, Reflux Disease, Rheumatic Fever, Sexually Transmitted Dis,     Sinus Trouble, Skin Disease/Psoriais/Ecz, Stroke, Thyroid Problem, Tuberculosis     or Pos TB Te      Psychiatric History      none            PREVENTION      Hx Influenza Vaccination:  Yes      Date Influenza Vaccine Given:  Oct 1, 2018      Influenza Vaccine Declined:  No      2 or More Falls Past Year?:  No      Fall Past Year with Injury?:  No      Hx Pneumococcal Vaccination:  Yes      Encouraged to follow-up with:  PCP regarding preventative exams.      Chart initiated by      Ann Marie Anne CMA            ALLERGIES/MEDICATIONS      Allergies:        Coded Allergies:             PENICILLINS (Verified  Allergy, Mild, RASH, 8/29/19)      Medications    Last Reconciled on 8/29/19 13:20 by FREDA LEYVA      Arformoterol Tartrate (Brovana) 15 Mcg/2 Ml Vial.neb      15 MCG INH RTBID, #60 NEB 0 Refills         Prov: Rhys Park         8/15/19       Revefenacin (YUPELRI) 175 Mcg/3 Ml Nebu      175 MCG INH RTQDAY for 30 Days, #30 NEB 0 Refills         Prov: Rhys Prak         8/15/19       Neb-Budesonide (Budesonide) 0.5 Mg/2 Ml Ampul.neb      0.5 MG INH RTBID, #60 NEB 0 Refills         Prov: Rhys Park         8/15/19       Solifenacin (Vesicare*) 5 Mg Tab      10 MG PO QDAY, #30 TAB 0 Refills         Prov: Rhys Park         8/15/19       predniSONE* (predniSONE*) 20 Mg Tablet      40 MG PO QDAY, #10 TAB         Prov: Rhys Park         8/15/19       Guaifenesin (Humibid La*) 600 Mg Tab.er.12h      1200 MG PO BID, #20 TAB.ER 0 Refills         Prov: Rhys Park         8/15/19       Metoprolol Succinate (Metoprolol Succinate) 25 Mg Tab.er.24h      12.5 MG PO QDAY,  #15 TAB 0 Refills         Reported         8/13/19       MDI-Albuterol (Ventolin HFA) 8 Gm Hfa.aer.ad      2 PUFFS INH Q4-6H PRN for DYSPNEA, #1 INH 6 Refills         Prov: Jasmeet Whiteside         1/29/19       Montelukast Sodium (Singulair*) 10 Mg Tab      10 MG PO HS, #30 TAB 9 Refills         Prov: Jasmeet Whiteside         11/19/18       Potassium Chloride (KCl*) 20 Meq/15 Ml Solution      20 MEQ PO BID, BOTTLE         Reported         7/26/17       Magnesium Oxide (Magnesium Oxide*) 400 Mg Tablet      400 MG PO BID, #60 TAB 0 Refills         Reported         7/26/17       Apixaban (Eliquis) 2.5 Mg Tablet      2.5 MG PO BID for 30 Days, #60 TAB         Reported         7/26/17       Spironolactone (Spironolactone*) 25 Mg Tablet      25 MG PO PC MARYLIN, #30 TAB 0 Refills         Reported         2/8/16       Alendronate Sodium (Alendronate) 70 Mg Tablet      70 MG PO Kidd, #4 TAB         Reported         7/24/15       Atorvastatin (Atorvastatin) 40 Mg Tab      40 MG PO HS, #30 TAB 0 Refills         Reported         1/14/15       Aspirin (Aspirin Low-Strength 81 Mg) 81 Mg Tab      1 TAB PO QDAY, TAB 0 Refills         Reported         7/22/09       Amlodipine/Benazepril Hcl (Amlodipine/Benazepril 5/20 Mg) 1 Cap Cap      1 EACH PO QAM, 0 Refills         Reported         7/22/09      Current Medications      Current Medications Reviewed 8/29/19            EXAM      CONSTITUTIONAL: Pleasant  normal conversant.       EYES : Pink conjunctive, no ptosis, PERRL.       ENMT : Nose and ears appear normal, normal dentition, mild posterior pharyngeal     wall erythema, no sinus tenderness. Mallampati classification       Neck: Nontender, no masses, no thyromegaly, no nodules.      Resp : Mildly decreased breath sounds throughout, no wheezes, rhonchi or     crackles, normal work of breathing noted.        CVS  : No carotid bruits, s1s2 nl, RRR, no murmur, rubs or gallop, no peripheral    edema       Chest wall: Normal rise with inspiration,  nontender on palpation.      GI   : Abdomen soft, with no masses, no hepatosplenomegaly, no hernias, BS+      MSK  : Normal gait and station, no digital cyanosis or clubbing       Skin : No rashes, ulcerations or lesions, normal turgor and temperature      Neuro: CN II - XII intact, no sensory deficits, DTRs intact and symmetrical, no     motor weakness      Psych: Appropriate affect, A   Vitals      Vitals:             Height 62 in / 157.48 cm           Weight 140 lbs  / 63.036042 kg           BSA 1.64 m2           BMI 25.6 kg/m2           Temperature 98.1 F / 36.72 C - Oral           Pulse 71           Respirations 13           Blood Pressure 114/85 Sitting, Right Arm           Pulse Oximetry 94%, room air            REVIEW      Results Reviewed      PCCS Results Reviewed?:  Yes Prev Lab Results, Yes Prev Radiology Results, Yes     Previous Mecial Records (I personally reviewed the patient's most recent     pulmonary consultation, progress notes and discharge summary.)      Radiographic Results               [facility]                PACS RADIOLOGY REPORT            Patient: LESTER CARL   Acct: #[id number]   Report: #[id number]            UNIT #: [id number]    DOS: 19 0841      INSURANCE:MEDICARE PART A   LOCATION:[place] [address]   : 1931            PROVIDERS      ADMITTING:  Rhys Park   ATTENDING: Rhys Park      FAMILY:  RACHEL MUNOZ   ORDERING:  AI ROBBINS         OTHER:    DICTATING:  YAZMIN DIAL MD            REQ #:[id number]   EXAM:CHWO - CT CHEST without CONTRAST      REASON FOR EXAM:  hypoxemia, copd      REASON FOR VISIT:  AECOPD, HYPERCAPNIC RESP FAILURE            *******Signed******         PROCEDURE:   CT CHEST WITHOUT CONTRAST             COMPARISON:   [facility], CT, CHEST W/ CONTRAST, 2017, 15:35.             INDICATIONS:   Hypoxemia, COPD             TECHNIQUE:   CT images were created without  the administration of contrast     material.               PROTOCOL:     Standard imaging protocol performed                RADIATION:     DLP: 447 mGy*cm          Automated exposure control was utilized to minimize radiation dose.              FINDINGS:         Visualized lower neck without acute abnormality.  Lack of contrast limits     assessment of mediastinum       and vasculature.  Heart size top normal.  Extensive coronary calcifications.      The ascending aorta       measures up to 4.4 cm at the level of the main pulmonary artery, unchanged.  No     mediastinal, hilar,       or axillary adenopathy.  No pericardial or pleural effusion.  Main pulmonary     artery dilated up to       3.7 cm which is nonspecific but can be seen with pulmonary hypertension.             Trachea and mainstem bronchi patent.  No pneumothorax.  No focal consolidation.     Moderate       emphysema.  Small ground-glass 4 mm nodule in the left upper lobe is stable on     image 14. Small 4 mm       nodule abutting the left pulmonary fissure on image 40 likely intrapulmonary     lymph node, stable.        Visualized portions of the liver, spleen, adrenal glands without acute     abnormality.  There is a       nonobstructing 2 mm right upper pole renal calculus.  Exaggerated thoracic     kyphosis with multilevel       disc disease.  Height loss at the inferior endplate of T11 is unchanged.  No     acute fracture.             CONCLUSION:         1. No acute intrathoracic process.      2. Emphysema.      3. Coronary atherosclerotic disease.      4.  Additional chronic findings above.              YAZMIN IDAL MD             Electronically Signed and Approved By: YAZMIN DIAL MD on 8/13/2019 at 15:25                        Until signed, this is an unconfirmed preliminary report that may contain      errors and is subject to change.                                              JOSE:      D:08/13/19 1525            Assessment      Notes       New Medications      * TIOTROPIUM BROMIDE (Spiriva Respimat 2.5 mcg/Puff) 4 GM MIST.INHAL: 2 PUFFS       INH QDAY #1      Renewed Medications      * Neb-Budesonide (Budesonide) 0.5 MG/2 ML AMPUL.NEB: 0.5 MG INH RTBID #60         Instructions: DIAGNOSIS CODE REQUIRED PRIOR TO PRESCRIBING.      * ARFORMOTEROL TARTRATE (Brovana) 15 MCG/2 ML VIAL.NEB: 15 MCG INH RTBID #60         Instructions: DIAGNOSIS CODE REQUIRED PRIOR TO PRESCRIBING.      ASSESSMENT:       1. Chronic obstructive pulmonary disease/asthma overlap with recent acute     exacerbation resolving.       2. Chronic hypoxic respiratory failure on supplemental oxygen with exertion and     sleep.        3. Elevated IgE level with recent fungal work up negative.        4. History of mold exposure in the home.        5. Tobacco abuse of cigarettes in remission.              PLAN:      1. At this time I will have the patient continue on Brovana and Pulmicort     nebulizer twice daily. She was not able to afford Yupelri I will change it back     to Spiriva Respimat 2.5 mcg 2 puffs once daily. I have sent in refills for her     today. She has completed her recent prednisone taper and seems to be improved.            2. I have discussed with the patient that her fungal testing all came back     negative despite her IgE still being elevated. This has chronically been     elevated and she has had negative fungal work up in the past as well.       3. Continue supplemental oxygen as needed to keep oxygen saturation above 89%.        4. Continue Singulair.       5. She has mild REM sleep apnea but does not wish to use a CPAP or BiPAP.         6. Follow up with Dr. Whiteside as scheduled, sooner if needed.            Patient Education      Patient Education Provided:  COPD, How to use an Inhaler, How to use a Nebulizer      Time Spent:  > 50% /Coord Care                 Disclaimer: Converted document may not contain table formatting or lab diagrams. Please see [facility] for the authenticated document.

## 2021-06-16 ENCOUNTER — OFFICE VISIT (OUTPATIENT)
Dept: NEUROLOGY | Facility: CLINIC | Age: 86
End: 2021-06-16

## 2021-06-16 VITALS
HEIGHT: 63 IN | DIASTOLIC BLOOD PRESSURE: 57 MMHG | WEIGHT: 123 LBS | HEART RATE: 65 BPM | TEMPERATURE: 97.8 F | SYSTOLIC BLOOD PRESSURE: 107 MMHG | BODY MASS INDEX: 21.79 KG/M2

## 2021-06-16 DIAGNOSIS — F02.80 ALZHEIMER'S DISEASE (HCC): Primary | ICD-10-CM

## 2021-06-16 DIAGNOSIS — G30.9 ALZHEIMER'S DISEASE (HCC): Primary | ICD-10-CM

## 2021-06-16 PROCEDURE — 99213 OFFICE O/P EST LOW 20 MIN: CPT | Performed by: PSYCHIATRY & NEUROLOGY

## 2021-06-16 RX ORDER — ALBUTEROL SULFATE 90 UG/1
AEROSOL, METERED RESPIRATORY (INHALATION)
COMMUNITY
Start: 2021-06-05 | End: 2022-01-01

## 2021-06-16 RX ORDER — DONEPEZIL HYDROCHLORIDE 5 MG/1
TABLET, FILM COATED ORAL
COMMUNITY
Start: 2021-05-02 | End: 2021-08-20 | Stop reason: SDUPTHER

## 2021-06-16 RX ORDER — IPRATROPIUM BROMIDE AND ALBUTEROL SULFATE 2.5; .5 MG/3ML; MG/3ML
SOLUTION RESPIRATORY (INHALATION)
COMMUNITY
Start: 2021-04-12 | End: 2021-07-07

## 2021-06-16 RX ORDER — POTASSIUM CHLORIDE 20MEQ/15ML
LIQUID (ML) ORAL
COMMUNITY
Start: 2021-06-01 | End: 2022-01-01

## 2021-06-16 RX ORDER — AMLODIPINE BESYLATE AND BENAZEPRIL HYDROCHLORIDE 5; 20 MG/1; MG/1
1 CAPSULE ORAL DAILY
COMMUNITY
Start: 2021-05-20 | End: 2021-01-01

## 2021-06-16 RX ORDER — BUDESONIDE 0.5 MG/2ML
INHALANT ORAL
COMMUNITY
Start: 2021-06-05 | End: 2021-08-10

## 2021-06-16 RX ORDER — TIOTROPIUM BROMIDE AND OLODATEROL 3.124; 2.736 UG/1; UG/1
SPRAY, METERED RESPIRATORY (INHALATION)
COMMUNITY
Start: 2021-06-08 | End: 2021-06-30 | Stop reason: SDUPTHER

## 2021-06-16 RX ORDER — APIXABAN 2.5 MG/1
TABLET, FILM COATED ORAL
COMMUNITY
Start: 2021-06-13 | End: 2021-01-01

## 2021-06-16 RX ORDER — METOPROLOL SUCCINATE 25 MG/1
TABLET, EXTENDED RELEASE ORAL
COMMUNITY
Start: 2021-05-10 | End: 2021-09-08

## 2021-06-16 RX ORDER — ATORVASTATIN CALCIUM 40 MG/1
40 TABLET, FILM COATED ORAL
COMMUNITY
Start: 2021-05-10 | End: 2021-09-08

## 2021-06-16 RX ORDER — ALENDRONATE SODIUM 70 MG/1
TABLET ORAL
COMMUNITY
Start: 2021-05-28 | End: 2022-01-01

## 2021-06-16 RX ORDER — SPIRONOLACTONE 25 MG/1
25 TABLET ORAL DAILY
COMMUNITY
Start: 2021-03-24 | End: 2021-01-01 | Stop reason: SDUPTHER

## 2021-06-16 RX ORDER — FLUOXETINE 10 MG/1
CAPSULE ORAL
COMMUNITY
Start: 2021-06-04 | End: 2021-07-01

## 2021-06-16 RX ORDER — MONTELUKAST SODIUM 10 MG/1
10 TABLET ORAL
COMMUNITY
Start: 2021-05-10 | End: 2021-08-20

## 2021-06-16 NOTE — ASSESSMENT & PLAN NOTE
She is stable with her Alzheimer's dementia and lives independently.  Her daughter checks on her every day.  I discussed with them that they can follow-up with her primary care provider to get refills of Donepezil 5 mg daily.  In the future she could be evaluated as needed in this clinic.

## 2021-06-16 NOTE — PROGRESS NOTES
"Chief Complaint  Memory Loss and Follow-up    Subjective          Stacey Juarez is a 89 y.o. female who presents to [facility]  History of Present Illness  89-year-old woman here for follow-up for memory problems.  She is taking Aricept 5 mg daily.  She still is by herself.  Her daughter who checks on her every day is with her today.  Her daughter thinks that she is safe with evidence of the living except when she is stubborn.  For example she took the garbage out and the daughter is afraid that she is going to fall.  In terms of bathing, dressing, toileting, walking she is independent.  She is able to carry on conversations and according to the daughter she is not that bad off other than she does ask the same questions several times in a day.  Objective   Vital Signs:   /57 (BP Location: Right arm, Patient Position: Sitting)   Pulse 65   Temp 97.8 °F (36.6 °C)   Ht 160 cm (62.99\")   Wt 55.8 kg (123 lb)   BMI 21.79 kg/m²     Physical Exam   She is alert, fluent, phasic follows commands well.  She is oriented to the time except that she could not tell me the date.  She fully oriented to place.  She can tell me the president of [place] although she confused Miguel with Samaria.  To tell me the previous president [place].  She can tell me the president before Trump as well.  On second attempt on asking her the date she was able to give me the date.  Clock drawing is intact.  She has difficulty with her vision.  Heart is regular with a normal in rate.     Assessment and Plan  Diagnoses and all orders for this visit:    1. Alzheimer's disease (CMS/LTAC, located within [facility]) (Primary)  Assessment & Plan:  She is stable with her Alzheimer's dementia and lives independently.  Her daughter checks on her every day.  I discussed with them that they can follow-up with her primary care provider to get refills of Donepezil 5 mg daily.  In the future she could be evaluated as needed in this " clinic.         Total time spent with the patient and coordinating patient care was 20 minutes.    Follow Up  No follow-ups on file.  Patient was given instructions and counseling regarding her condition or for health maintenance advice. Please see specific information pulled into the AVS if appropriate.

## 2021-06-30 ENCOUNTER — OFFICE VISIT (OUTPATIENT)
Dept: PULMONOLOGY | Facility: CLINIC | Age: 86
End: 2021-06-30

## 2021-06-30 VITALS
BODY MASS INDEX: 24.24 KG/M2 | SYSTOLIC BLOOD PRESSURE: 109 MMHG | HEIGHT: 63 IN | OXYGEN SATURATION: 100 % | DIASTOLIC BLOOD PRESSURE: 53 MMHG | HEART RATE: 45 BPM | WEIGHT: 136.8 LBS | RESPIRATION RATE: 14 BRPM

## 2021-06-30 DIAGNOSIS — I10 HYPERTENSION, UNSPECIFIED TYPE: ICD-10-CM

## 2021-06-30 DIAGNOSIS — I48.91 ATRIAL FIBRILLATION, UNSPECIFIED TYPE (HCC): ICD-10-CM

## 2021-06-30 DIAGNOSIS — F17.201 TOBACCO DEPENDENCE IN REMISSION: ICD-10-CM

## 2021-06-30 DIAGNOSIS — J96.11 CHRONIC RESPIRATORY FAILURE WITH HYPOXIA (HCC): ICD-10-CM

## 2021-06-30 DIAGNOSIS — J44.9 ASTHMA-COPD OVERLAP SYNDROME (HCC): Primary | ICD-10-CM

## 2021-06-30 DIAGNOSIS — R06.09 DYSPNEA ON EXERTION: ICD-10-CM

## 2021-06-30 PROCEDURE — 99213 OFFICE O/P EST LOW 20 MIN: CPT | Performed by: NURSE PRACTITIONER

## 2021-06-30 RX ORDER — TIOTROPIUM BROMIDE AND OLODATEROL 3.124; 2.736 UG/1; UG/1
2 SPRAY, METERED RESPIRATORY (INHALATION) DAILY
Qty: 2 EACH | Refills: 0 | Status: SHIPPED | OUTPATIENT
Start: 2021-06-30 | End: 2021-06-30

## 2021-06-30 RX ORDER — TIOTROPIUM BROMIDE AND OLODATEROL 3.124; 2.736 UG/1; UG/1
2 SPRAY, METERED RESPIRATORY (INHALATION) DAILY
Qty: 2 EACH | Refills: 0 | COMMUNITY
Start: 2021-06-30 | End: 2021-07-02 | Stop reason: SDUPTHER

## 2021-06-30 NOTE — PATIENT INSTRUCTIONS
COPD and Physical Activity  Chronic obstructive pulmonary disease (COPD) is a long-term (chronic) condition that affects the lungs. COPD is a general term that can be used to describe many different lung problems that cause lung swelling (inflammation) and limit airflow, including chronic bronchitis and emphysema.  The main symptom of COPD is shortness of breath, which makes it harder to do even simple tasks. This can also make it harder to exercise and be active. Talk with your health care provider about treatments to help you breathe better and actions you can take to prevent breathing problems during physical activity.  What are the benefits of exercising with COPD?  Exercising regularly is an important part of a healthy lifestyle. You can still exercise and do physical activities even though you have COPD. Exercise and physical activity improve your shortness of breath by increasing blood flow (circulation). This causes your heart to pump more oxygen through your body. Moderate exercise can improve your:  · Oxygen use.  · Energy level.  · Shortness of breath.  · Strength in your breathing muscles.  · Heart health.  · Sleep.  · Self-esteem and feelings of self-worth.  · Depression, stress, and anxiety levels.  Exercise can benefit everyone with COPD. The severity of your disease may affect how hard you can exercise, especially at first, but everyone can benefit. Talk with your health care provider about how much exercise is safe for you, and which activities and exercises are safe for you.  What actions can I take to prevent breathing problems during physical activity?  · Sign up for a pulmonary rehabilitation program. This type of program may include:  ? Education about lung diseases.  ? Exercise classes that teach you how to exercise and be more active while improving your breathing. This usually involves:  § Exercise using your lower extremities, such as a stationary bicycle.  § About 30 minutes of exercise, 2  to 5 times per week, for 6 to 12 weeks  § Strength training, such as push ups or leg lifts.  ? Nutrition education.  ? Group classes in which you can talk with others who also have COPD and learn ways to manage stress.  · If you use an oxygen tank, you should use it while you exercise. Work with your health care provider to adjust your oxygen for your physical activity. Your resting flow rate is different from your flow rate during physical activity.  · While you are exercising:  ? Take slow breaths.  ? Pace yourself and do not try to go too fast.  ? Purse your lips while breathing out. Pursing your lips is similar to a kissing or whistling position.  ? If doing exercise that uses a quick burst of effort, such as weight lifting:  § Breathe in before starting the exercise.  § Breathe out during the hardest part of the exercise (such as raising the weights).  Where to find support  You can find support for exercising with COPD from:  · Your health care provider.  · A pulmonary rehabilitation program.  · Your local health department or community health programs.  · Support groups, online or in-person. Your health care provider may be able to recommend support groups.  Where to find more information  You can find more information about exercising with COPD from:  · American Lung Association: lung.org.  · COPD Foundation: copdfoundation.org.  Contact a health care provider if:  · Your symptoms get worse.  · You have chest pain.  · You have nausea.  · You have a fever.  · You have trouble talking or catching your breath.  · You want to start a new exercise program or a new activity.  Summary  · COPD is a general term that can be used to describe many different lung problems that cause lung swelling (inflammation) and limit airflow. This includes chronic bronchitis and emphysema.  · Exercise and physical activity improve your shortness of breath by increasing blood flow (circulation). This causes your heart to provide more  oxygen to your body.  · Contact your health care provider before starting any exercise program or new activity. Ask your health care provider what exercises and activities are safe for you.  This information is not intended to replace advice given to you by your health care provider. Make sure you discuss any questions you have with your health care provider.  Document Revised: 04/08/2020 Document Reviewed: 01/10/2019  Elsevier Patient Education © 2021 [employer]

## 2021-06-30 NOTE — PROGRESS NOTES
Primary Care Provider  Jered Hewitt PA     Referring Provider  No ref. provider found     Chief Complaint  COPD    Subjective          Stacey Juarez presents to [facility] PULMON[facility] CRITICAL CARE MEDICINE  History of Present Illness  Stacey Juarez is a 89 y.o. female patient of Dr. 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  She is here for a routine follow-up visit today.    Patient states that since her last visit she is doing well.  She describes her shortness of breath as moderate in severity, worse with exertion improved with rest and oxygen.  She continues to wear 2 L of oxygen via nasal cannula continuously.  She uses Pulmicort and Stiolto as prescribed.  She continues to use albuterol and DuoNeb's as needed.  She states that she uses her albuterol approximately every 4-6 hours.  She is able to perform her ADLs without difficulty.  She is up-to-date with her flu and Covid vaccines as well as Prevnar.  It appears that she will be due for a Pneumovax at her next office visit.      Her history of smoking is      Tobacco Use: Medium Risk   • Smoking Tobacco Use: Former Smoker   • Smokeless Tobacco Use: Never Used   .    Review of Systems   Constitutional: Negative for chills, fatigue, fever, unexpected weight gain and unexpected weight loss.   HENT: Negative for congestion (Nasal), hearing loss, mouth sores, nosebleeds, postnasal drip, sore throat and trouble swallowing.    Eyes: Negative for blurred vision and visual disturbance.   Respiratory: Positive for shortness of breath. Negative for apnea, cough and wheezing.         Negative for Hemoptysis     Cardiovascular: Negative for chest pain, palpitations and leg swelling.   Gastrointestinal: Negative for abdominal pain, constipation, diarrhea, nausea, vomiting and GERD.        Negative for  Jaundice  Negative for Bloating  Negative for Melena   Musculoskeletal: Negative for joint swelling and myalgias.        Negative for Joint pain  Negative for Joint stiffness   Skin: Negative for color change.        Negative for cyanosis   Neurological: Negative for syncope, weakness, numbness and headache.      Sleep: Negative for Excessive daytime sleep  Negative for for morning headaches  Negative for Snoring    Family History   Problem Relation Age of Onset   • Heart disease Mother    • Arthritis Mother    • Heart disease Father         Social History     Socioeconomic History   • Marital status:      Spouse name: Not on file   • Number of children: Not on file   • Years of education: Not on file   • Highest education level: Not on file   Tobacco Use   • Smoking status: Former Smoker     Packs/day: 1.00     Types: Cigarettes     Start date: 1961     Quit date: 2017     Years since quittin.0   • Smokeless tobacco: Never Used   • Tobacco comment: SMOKED 31 PLUS YEARS QUIT 1 YEAR AGO   Vaping Use   • Vaping Use: Never used   Substance and Sexual Activity   • Alcohol use: Never   • Drug use: Never   • Sexual activity: Defer        Past Medical History:   Diagnosis Date   • A-fib (CMS/[facility]) 2018   • AAA (abdominal aortic aneurysm) (CMS/[facility])    • Acute mesenteric ischemia (CMS/[facility]) 03/10/2015   • Arthritis    • Benign essential hypertension    • Bladder disorder    • Bladder problem    • CAD (coronary artery disease)    • Cataract    • Colon disorder    • COPD (chronic obstructive pulmonary disease) (CMS/[facility])    • Dementia (CMS/[facility])    • Diverticulitis    • EKG, abnormal 03/10/2015   • Essential hypertension 05/10/2017   • Heart attack (CMS/[facility])    • Hemorrhoids    • HTN (hypertension)    • Hyperlipemia    • Kidney disease    • Limb swelling    • Mixed hyperlipidemia    • Myocardial infarction (CMS/[facility])    • Osteoporosis 2019   • Oxygen dependent 2020   • Paroxysmal A-fib  (CMS/[facility]) 08/02/2017   • Respiratory failure (CMS/[facility]) 12/17/2020   • Tuberculosis     OR POSITIVE PPD TEST   • Vitamin D deficiency 05/08/2015        Immunization History   Administered Date(s) Administered   • Influenza, Unspecified 12/17/2020   • Pneumococcal Conjugate 13-Valent (PCV13) 08/10/2018   • Pneumococcal Polysaccharide (PPSV23) 11/23/2011   • Zostavax 10/01/2012         Allergies   Allergen Reactions   • Penicillins Anaphylaxis          Current Outpatient Medications:   •  albuterol sulfate  (90 Base) MCG/ACT inhaler, INHALE 2 PUFFS BY MOUTH EVERY 4 TO 6 HOURS AS NEEDED FOR DIFFICULT BREATHING, Disp: , Rfl:   •  alendronate (FOSAMAX) 70 MG tablet, TAKE 1 TABLET BY MOUTH 1 TIME A WEEK, Disp: , Rfl:   •  amLODIPine-benazepril (LOTREL 5-20) 5-20 MG per capsule, Take 1 capsule by mouth Daily., Disp: , Rfl:   •  atorvastatin (LIPITOR) 40 MG tablet, Take 40 mg by mouth every night at bedtime., Disp: , Rfl:   •  budesonide (PULMICORT) 0.5 MG/2ML nebulizer solution, INHALE THE CONTENTS OF 1 VIAL PER NEBULIZER TWICE DAILY, Disp: , Rfl:   •  donepezil (ARICEPT) 5 MG tablet, , Disp: , Rfl:   •  Eliquis 2.5 MG tablet tablet, , Disp: , Rfl:   •  ipratropium-albuterol (DUO-NEB) 0.5-2.5 mg/3 ml nebulizer, INHALE THE CONTENTS OF 1 VIAL PER NEBULIZER EVERY 6 HOURS, Disp: , Rfl:   •  metoprolol succinate XL (TOPROL-XL) 25 MG 24 hr tablet, TAKE 1/2 TABLET BY MOUTH EVERY DAY OR AS DIRECTED, Disp: , Rfl:   •  montelukast (SINGULAIR) 10 MG tablet, Take 10 mg by mouth every night at bedtime., Disp: , Rfl:   •  potassium chloride (KAYCIEL) 20 MEQ/15ML (10%) solution, TAKE 15 ML BY MOUTH TWICE DAILY, Disp: , Rfl:   •  spironolactone (ALDACTONE) 25 MG tablet, Take 25 mg by mouth Daily., Disp: , Rfl:   •  Stiolto Respimat 2.5-2.5 MCG/ACT aerosol solution inhaler, Inhale 2 puffs Daily., Disp: 2 each, Rfl: 0  •  FLUoxetine (PROzac) 10 MG capsule, , Disp: , Rfl:      Objective   Physical Exam  Constitutional:       General:  "She is not in acute distress.     Appearance: Normal appearance. She is normal weight.   HENT:      Right Ear: Hearing normal.      Left Ear: Hearing normal.      Nose: No nasal tenderness or congestion.      Mouth/Throat:      Mouth: Mucous membranes are moist. No oral lesions.   Eyes:      Extraocular Movements: Extraocular movements intact.      Pupils: Pupils are equal, round, and reactive to light.   Neck:      Thyroid: No thyroid mass or thyromegaly.   Cardiovascular:      Rate and Rhythm: Normal rate and regular rhythm.      Pulses: Normal pulses.      Heart sounds: Normal heart sounds. No murmur heard.     Pulmonary:      Effort: Pulmonary effort is normal.      Breath sounds: Decreased breath sounds present. No wheezing, rhonchi or rales.      Comments: Diminished breath sounds throughout.  Patient on 2 L of oxygen via nasal cannula continuously.  Patient able speak full sentences without difficulty.  Abdominal:      General: Bowel sounds are normal. There is no distension.      Palpations: Abdomen is soft.      Tenderness: There is no abdominal tenderness.   Musculoskeletal:      Cervical back: Neck supple.      Right lower leg: No edema.      Left lower leg: No edema.   Lymphadenopathy:      Cervical: No cervical adenopathy.      Upper Body:      Right upper body: No axillary adenopathy.   Skin:     General: Skin is warm and dry.      Findings: No lesion or rash.   Neurological:      General: No focal deficit present.      Mental Status: She is alert and oriented to person, place, and time.      Cranial Nerves: Cranial nerves are intact.   Psychiatric:         Mood and Affect: Affect normal. Mood is not anxious or depressed.         Vital Signs:   /53   Pulse (!) 45   Resp 14   Ht 160 cm (63\")   Wt 62.1 kg (136 lb 12.8 oz)   SpO2 100% Comment: on 2 liters  BMI 24.23 kg/m²        Result Review :     CMP    CMP 10/22/20 11/23/20 4/23/21   Glucose 89 101 (A) 79   BUN 19 30 (A) 22   Creatinine " 0.79 1.30 (A) 1.01 (A)   Sodium 140 138 139   Potassium 4.2 5.7 (A) 4.7   Chloride 102 106 103   Calcium 9.9 10.3 9.4   Albumin 4.0 3.0 (A) 4.1   Total Bilirubin 0.45 0.38 0.32   Alkaline Phosphatase 118 105 108   AST (SGOT) 15 17 20   ALT (SGPT) <5 (A) <5 (A) 7 (A)   (A) Abnormal value       Comments are available for some flowsheets but are not being displayed.           CBC w/diff    CBC w/Diff 10/22/20 11/23/20 4/23/21   WBC 5.65 6.02 3.87 (A)   RBC 4.99 5.05 4.41   Hemoglobin 13.0 12.9 12.2   Hematocrit 43.3 44.0 40.2   MCV 86.8 87.1 91.2   MCH 26.1 (A) 25.5 (A) 27.7   MCHC 30.0 (A) 29.3 (A) 30.3 (A)   RDW 14.6 (A) 15.3 (A) 13.8   Platelets 252 404 (A) 215   Neutrophil Rel % 58.0 77.5 58.6   Lymphocyte Rel % 25.5 13.0 (A) 25.6   Monocyte Rel % 10.4 (A) 7.5 9.8   Eosinophil Rel % 5.5 1.0 5.2   Basophil Rel % 0.4 0.5 0.5   (A) Abnormal value            Data reviewed: Radiologic studies Chest x-ray 12/20/2019 and Dr. Whiteside's last office note   Procedures        Assessment and Plan    Diagnoses and all orders for this visit:    1. Asthma-COPD overlap syndrome (CMS/HCC) (Primary)  -     Discontinue: Stiolto Respimat 2.5-2.5 MCG/ACT aerosol solution inhaler; Inhale 2 puffs Daily.  Dispense: 2 each; Refill: 0  -     Stiolto Respimat 2.5-2.5 MCG/ACT aerosol solution inhaler; Inhale 2 puffs Daily.  Dispense: 2 each; Refill: 0    2. Dyspnea on exertion    3. Tobacco dependence in remission    4. Chronic respiratory failure with hypoxia (CMS/HCC)    5. Atrial fibrillation, unspecified type (CMS/HCC)    6. Hypertension, unspecified type    7.  Continue Stiolto.  Samples given to patient today.  She does not believe she needs refills at this time.  8.  Continue Pulmicort nebulizer treatments as scheduled.  Rinse mouth out after each use.  9.  Continue albuterol and DuoNeb's as needed.  10.  Continue submental oxygen to maintain oxygen saturations at or above 89%.  11.  Continue follow-up with cardiology for atrial  fibrillation and hypertension.  12. Instructed patient to call the office, 911, or go to the emergency room with any new or worsening symptoms      Follow Up   Return in about 6 months (around 12/30/2021) for Recheck with Dr. Whiteside.  Patient was given instructions and counseling regarding her condition or for health maintenance advice. Please see specific information pulled into the AVS if appropriate.

## 2021-07-01 DIAGNOSIS — F32.A DEPRESSION, UNSPECIFIED DEPRESSION TYPE: Primary | ICD-10-CM

## 2021-07-01 RX ORDER — FLUOXETINE 10 MG/1
CAPSULE ORAL
Qty: 30 CAPSULE | Refills: 5 | Status: SHIPPED | OUTPATIENT
Start: 2021-07-01 | End: 2022-01-01

## 2021-07-02 DIAGNOSIS — J44.9 ASTHMA-COPD OVERLAP SYNDROME (HCC): ICD-10-CM

## 2021-07-06 RX ORDER — TIOTROPIUM BROMIDE AND OLODATEROL 3.124; 2.736 UG/1; UG/1
2 SPRAY, METERED RESPIRATORY (INHALATION) DAILY
Qty: 2 EACH | Refills: 0 | COMMUNITY
Start: 2021-07-06 | End: 2021-01-01

## 2021-07-07 RX ORDER — IPRATROPIUM BROMIDE AND ALBUTEROL SULFATE 2.5; .5 MG/3ML; MG/3ML
3 SOLUTION RESPIRATORY (INHALATION)
Qty: 3 ML | Refills: 11 | Status: SHIPPED | OUTPATIENT
Start: 2021-07-07 | End: 2021-08-06

## 2021-08-10 RX ORDER — BUDESONIDE 0.5 MG/2ML
INHALANT ORAL
Qty: 120 ML | Refills: 11 | Status: SHIPPED | OUTPATIENT
Start: 2021-08-10 | End: 2021-09-14 | Stop reason: SDUPTHER

## 2021-08-11 RX ORDER — DONEPEZIL HYDROCHLORIDE 5 MG/1
TABLET, FILM COATED ORAL
Qty: 30 TABLET | OUTPATIENT
Start: 2021-08-11

## 2021-08-11 NOTE — TELEPHONE ENCOUNTER
"Refill denied. \" I discussed with them that they can follow-up with her primary care provider to get refills of Donepezil 5 mg daily\" per last office note on 06/16/2021.   "

## 2021-08-20 ENCOUNTER — OFFICE VISIT (OUTPATIENT)
Dept: FAMILY MEDICINE CLINIC | Facility: CLINIC | Age: 86
End: 2021-08-20

## 2021-08-20 VITALS
BODY MASS INDEX: 24.8 KG/M2 | DIASTOLIC BLOOD PRESSURE: 68 MMHG | HEIGHT: 63 IN | OXYGEN SATURATION: 99 % | WEIGHT: 140 LBS | HEART RATE: 62 BPM | SYSTOLIC BLOOD PRESSURE: 117 MMHG

## 2021-08-20 DIAGNOSIS — I10 ESSENTIAL HYPERTENSION: Chronic | ICD-10-CM

## 2021-08-20 DIAGNOSIS — Z99.81 SUPPLEMENTAL OXYGEN DEPENDENT: Chronic | ICD-10-CM

## 2021-08-20 DIAGNOSIS — G30.9 ALZHEIMER'S DISEASE (HCC): Primary | ICD-10-CM

## 2021-08-20 DIAGNOSIS — F02.80 ALZHEIMER'S DISEASE (HCC): Primary | ICD-10-CM

## 2021-08-20 DIAGNOSIS — E78.00 PURE HYPERCHOLESTEROLEMIA: Chronic | ICD-10-CM

## 2021-08-20 DIAGNOSIS — R09.02 HYPOXIA: Chronic | ICD-10-CM

## 2021-08-20 PROCEDURE — 99214 OFFICE O/P EST MOD 30 MIN: CPT | Performed by: PHYSICIAN ASSISTANT

## 2021-08-20 RX ORDER — DONEPEZIL HYDROCHLORIDE 5 MG/1
5 TABLET, FILM COATED ORAL NIGHTLY
Qty: 90 TABLET | Refills: 3 | Status: SHIPPED | OUTPATIENT
Start: 2021-08-20

## 2021-08-20 NOTE — PROGRESS NOTES
Chief Complaint  COPD (4 month follow up), Hypertension, and Hyperlipidemia    Subjective          Stacey Juarez presents to [facility]  History of Present Illness    Stacey Juarez is a 89 y.o. female who presents today for a 4 month follow up COPD, HTN, HLD    Pt offers no complaints at this time. She denies any falls the past year.     Pt is requesting we refill Aricept, she stated Dr. Sarabia released her and she would like us to take over prescription.     Labs-4/2021      Current Outpatient Medications:   •  albuterol sulfate  (90 Base) MCG/ACT inhaler, INHALE 2 PUFFS BY MOUTH EVERY 4 TO 6 HOURS AS NEEDED FOR DIFFICULT BREATHING, Disp: , Rfl:   •  alendronate (FOSAMAX) 70 MG tablet, TAKE 1 TABLET BY MOUTH 1 TIME A WEEK, Disp: , Rfl:   •  amLODIPine-benazepril (LOTREL 5-20) 5-20 MG per capsule, Take 1 capsule by mouth Daily., Disp: , Rfl:   •  atorvastatin (LIPITOR) 40 MG tablet, Take 40 mg by mouth every night at bedtime., Disp: , Rfl:   •  budesonide (PULMICORT) 0.5 MG/2ML nebulizer solution, INHALE THE CONTENTS OF 1 VIAL PER NEBULIZER TWICE DAILY, Disp: 120 mL, Rfl: 11  •  donepezil (ARICEPT) 5 MG tablet, Take 1 tablet by mouth Every Night., Disp: 90 tablet, Rfl: 3  •  Eliquis 2.5 MG tablet tablet, , Disp: , Rfl:   •  FLUoxetine (PROzac) 10 MG capsule, TAKE 1 CAPSULE(10 MG) BY MOUTH EVERY DAY, Disp: 30 capsule, Rfl: 5  •  MAGnesium-Oxide 400 (241.3 Mg) MG tablet tablet, TAKE 1 TABLET TWICE DAILY, Disp: 60 tablet, Rfl: 9  •  metoprolol succinate XL (TOPROL-XL) 25 MG 24 hr tablet, TAKE 1/2 TABLET BY MOUTH EVERY DAY OR AS DIRECTED, Disp: , Rfl:   •  potassium chloride (KAYCIEL) 20 MEQ/15ML (10%) solution, TAKE 15 ML BY MOUTH TWICE DAILY, Disp: , Rfl:   •  spironolactone (ALDACTONE) 25 MG tablet, Take 25 mg by mouth Daily., Disp: , Rfl:   •  Stiolto Respimat 2.5-2.5 MCG/ACT aerosol solution inhaler, Inhale 2 puffs Daily. Lot number: [id number] Exp: 06/23, Disp: 2 each, Rfl:  "0  •  ipratropium-albuterol (DUO-NEB) 0.5-2.5 mg/3 ml nebulizer, Take 3 mL by nebulization 4 (Four) Times a Day for 120 doses., Disp: 3 mL, Rfl: 11    Objective   Vital Signs:   /68 (BP Location: Right arm)   Pulse 62   Ht 160 cm (63\")   Wt 63.5 kg (140 lb)   SpO2 99%   BMI 24.80 kg/m²    Estimated body mass index is 24.8 kg/m² as calculated from the following:    Height as of this encounter: 160 cm (63\").    Weight as of this encounter: 63.5 kg (140 lb).   Physical Exam  Vitals and nursing note reviewed.   Constitutional:       Appearance: Normal appearance.   HENT:      Head: Normocephalic and atraumatic.   Cardiovascular:      Rate and Rhythm: Normal rate and regular rhythm.   Pulmonary:      Effort: Pulmonary effort is normal.      Breath sounds: Normal breath sounds.      Comments: Decreased breath sounds throughout all lung fields  Musculoskeletal:      Cervical back: Neck supple.   Neurological:      Mental Status: She is alert.   Psychiatric:         Mood and Affect: Mood normal.         Behavior: Behavior normal.        [facility] 24/7    Result Review :     Common labs    Common Labsle 10/22/20 10/22/20 10/22/20 11/23/20 11/23/20 4/23/21 4/23/21 4/23/21    0853 0853 0853 1845 1845 0901 0901 0901   Glucose  89   101 (A)  79    BUN  19   30 (A)  22    Creatinine  0.79   1.30 (A)  1.01 (A)    Sodium  140   138  139    Potassium  4.2   5.7 (A)  4.7    Chloride  102   106  103    Calcium  9.9   10.3  9.4    Albumin  4.0   3.0 (A)  4.1    Total Bilirubin  0.45   0.38  0.32    Alkaline Phosphatase  118   105  108    AST (SGOT)  15   17  20    ALT (SGPT)  <5 (A)   <5 (A)  7 (A)    WBC 5.65   6.02  3.87 (A)     Hemoglobin 13.0   12.9  12.2     Hematocrit 43.3   44.0  40.2     Platelets 252   404 (A)  215     Total Cholesterol   148     148   Triglycerides   56     47   HDL Cholesterol   73 (A)     81 (A)   LDL Cholesterol    64 (A)     58 (A)   (A) Abnormal value       Comments are available for some " flowsheets but are not being displayed.                       Assessment and Plan      Diagnoses and all orders for this visit:    1. Alzheimer's disease (CMS/HCC) (Primary)  -     donepezil (ARICEPT) 5 MG tablet; Take 1 tablet by mouth Every Night.  Dispense: 90 tablet; Refill: 3    2. Essential hypertension  Comments:  Stable on lotrel 2.5/10 daily, metoprolol 25mg daily  Overview:  Stable on lotrel 2.5/10 daily, metoprolol 25mg daily      3. Pure hypercholesterolemia  Comments:  Stable on lipitor 40mg QHS    4. Hypoxia  Comments:  Stable on supplemental oxygen    5. Supplemental oxygen dependent  Comments:  2L NC 24/7       Continue cardio and pulmo - Kaini    Follow Up     Return in about 6 months (around 2/20/2022).    Refuses cane or walker for ambulation    I have reviewed information obtained and documented by others and I have confirmed the accuracy of this documented note.     Patient was given instructions and counseling regarding her condition or for health maintenance advice. Please see specific information pulled into the AVS if appropriate.     FREDA Jane

## 2021-09-08 RX ORDER — ATORVASTATIN CALCIUM 40 MG/1
TABLET, FILM COATED ORAL
Qty: 90 TABLET | Refills: 2 | Status: SHIPPED | OUTPATIENT
Start: 2021-09-08 | End: 2022-01-01

## 2021-09-08 RX ORDER — METOPROLOL SUCCINATE 25 MG/1
TABLET, EXTENDED RELEASE ORAL
Qty: 45 TABLET | Refills: 2 | Status: SHIPPED | OUTPATIENT
Start: 2021-09-08 | End: 2022-01-01 | Stop reason: ALTCHOICE

## 2021-09-14 DIAGNOSIS — R06.09 DYSPNEA ON EXERTION: ICD-10-CM

## 2021-09-14 DIAGNOSIS — J44.9 ASTHMA-COPD OVERLAP SYNDROME (HCC): Primary | ICD-10-CM

## 2021-09-14 RX ORDER — BUDESONIDE 0.5 MG/2ML
0.5 INHALANT ORAL 2 TIMES DAILY
Qty: 120 ML | Refills: 11 | Status: SHIPPED | OUTPATIENT
Start: 2021-09-14

## 2021-12-09 NOTE — PROGRESS NOTES
You have chosen to receive care through a telehealth visit.  Do you consent to use a video/audio connection for your medical care today? Yes  The ABCs of the Annual Wellness Visit  Subsequent Medicare Wellness Visit    Chief Complaint   Patient presents with   • Medicare Wellness-subsequent     AWV      Subjective    History of Present Illness:  Stacey Juarez is a 89 y.o. female who presents for a Subsequent Medicare Wellness Visit.    The following portions of the patient's history were reviewed and   updated as appropriate: allergies, current medications, past family history, past medical history, past social history, past surgical history and problem list.    Compared to one year ago, the patient feels her physical   health is the same.    Compared to one year ago, the patient feels her mental   health is better.    Recent Hospitalizations:  She was not admitted to the hospital during the last year.       Current Medical Providers:  Patient Care Team:  Jered Hewitt PA as PCP - General (Physician Assistant)    Outpatient Medications Prior to Visit   Medication Sig Dispense Refill   • albuterol sulfate  (90 Base) MCG/ACT inhaler INHALE 2 PUFFS BY MOUTH EVERY 4 TO 6 HOURS AS NEEDED FOR DIFFICULT BREATHING     • alendronate (FOSAMAX) 70 MG tablet TAKE 1 TABLET BY MOUTH 1 TIME A WEEK     • amLODIPine-benazepril (LOTREL 5-20) 5-20 MG per capsule TAKE ONE CAPSULE BY MOUTH EVERY DAY 90 capsule 1   • atorvastatin (LIPITOR) 40 MG tablet TAKE 1 TABLET BY MOUTH EVERY NIGHT AT BEDTIME 90 tablet 2   • budesonide (PULMICORT) 0.5 MG/2ML nebulizer solution Take 2 mL by nebulization 2 (two) times a day. 120 mL 11   • donepezil (ARICEPT) 5 MG tablet Take 1 tablet by mouth Every Night. 90 tablet 3   • Eliquis 2.5 MG tablet tablet TAKE 1 TABLET BY MOUTH TWICE DAILY 180 tablet 1   • FLUoxetine (PROzac) 10 MG capsule TAKE 1 CAPSULE(10 MG) BY MOUTH EVERY DAY 30 capsule 5   • MAGnesium-Oxide 400 (241.3 Mg) MG tablet tablet TAKE 1  TABLET TWICE DAILY 60 tablet 9   • metoprolol succinate XL (TOPROL-XL) 25 MG 24 hr tablet TAKE 1/2 TABLET BY MOUTH EVERY DAY OR AS DIRECTED 45 tablet 2   • montelukast (SINGULAIR) 10 MG tablet TAKE 1 TABLET BY MOUTH AT BEDTIME 30 tablet 3   • potassium chloride (KAYCIEL) 20 MEQ/15ML (10%) solution TAKE 15 ML BY MOUTH TWICE DAILY     • spironolactone (ALDACTONE) 25 MG tablet TAKE 1 TABLET BY MOUTH EVERY DAY 90 tablet 3   • Stiolto Respimat 2.5-2.5 MCG/ACT aerosol solution inhaler INHALE 2 PUFFS EVERY DAY 4 g 5   • ipratropium-albuterol (DUO-NEB) 0.5-2.5 mg/3 ml nebulizer Take 3 mL by nebulization 4 (Four) Times a Day for 120 doses. 3 mL 11     No facility-administered medications prior to visit.       No opioid medication identified on active medication list. I have reviewed chart for other potential  high risk medication/s and harmful drug interactions in the elderly.          Aspirin is not on active medication list.  Aspirin use is contraindicated for this patient due to: current use of Eliquis.  .    Patient Active Problem List   Diagnosis   • Alzheimer's disease (HCC)   • Pure hypercholesterolemia   • Essential hypertension   • Hypoxia   • Supplemental oxygen dependent     Advance Care Planning  Advance Directive is not on file.  ACP discussion was held with the patient during this visit. Patient does not have an advance directive, information provided.          Objective    Vitals:    21 1355   PainSc:   4   PainLoc: Generalized     BMI Readings from Last 1 Encounters:   21 24.80 kg/m²   BMI is within normal parameters. No follow-up required.    Does the patient have evidence of cognitive impairment? No              HEALTH RISK ASSESSMENT    Smoking Status:  Social History     Tobacco Use   Smoking Status Former Smoker   • Packs/day: 1.00   • Types: Cigarettes   • Start date: 1961   • Quit date: 2017   • Years since quittin.4   Smokeless Tobacco Never Used   Tobacco Comment    SMOKED  31 PLUS YEARS QUIT 1 YEAR AGO     Alcohol Consumption:  Social History     Substance and Sexual Activity   Alcohol Use Never     Fall Risk Screen:    MORROADI Fall Risk Assessment was completed, and patient is at LOW risk for falls.Assessment completed on:12/9/2021    Depression Screening:  PHQ-2/PHQ-9 Depression Screening 12/9/2021   Little interest or pleasure in doing things 0   Feeling down, depressed, or hopeless 0   Total Score 0       Health Habits and Functional and Cognitive Screening:  Functional & Cognitive Status 12/9/2021   Do you have difficulty preparing food and eating? No   Do you have difficulty bathing yourself, getting dressed or grooming yourself? No   Do you have difficulty using the toilet? No   Do you have difficulty moving around from place to place? No   Do you have trouble with steps or getting out of a bed or a chair? No   Current Diet Well Balanced Diet   Dental Exam Up to date   Eye Exam Up to date   Exercise (times per week) 0 times per week   Current Exercises Include No Regular Exercise   Do you need help using the phone?  No   Are you deaf or do you have serious difficulty hearing?  No   Do you need help with transportation? No   Do you need help shopping? No   Do you need help preparing meals?  No   Do you need help with housework?  No   Do you need help with laundry? No   Do you need help taking your medications? No   Do you need help managing money? No   Do you ever drive or ride in a car without wearing a seat belt? No   Have you felt unusual stress, anger or loneliness in the last month? No   Who do you live with? Alone   If you need help, do you have trouble finding someone available to you? No   Have you been bothered in the last four weeks by sexual problems? No   Do you have difficulty concentrating, remembering or making decisions? No       Age-appropriate Screening Schedule:  Refer to the list below for future screening recommendations based on patient's age, sex and/or  medical conditions. Orders for these recommended tests are listed in the plan section. The patient has been provided with a written plan.    Health Maintenance   Topic Date Due   • TDAP/TD VACCINES (1 - Tdap) Never done   • ZOSTER VACCINE (2 of 3) 11/26/2012   • DXA SCAN  10/09/2020   • INFLUENZA VACCINE  08/01/2021   • LIPID PANEL  04/23/2022              Assessment/Plan   CMS Preventative Services Quick Reference  Risk Factors Identified During Encounter  Inactivity/Sedentary  The above risks/problems have been discussed with the patient.  Follow up actions/plans if indicated are seen below in the Assessment/Plan Section.  Pertinent information has been shared with the patient in the After Visit Summary.    Diagnoses and all orders for this visit:    1. Medicare annual wellness visit, subsequent (Primary)        Follow Up:   No follow-ups on file.     An After Visit Summary and PPPS were made available to the patient.

## 2021-12-09 NOTE — PROGRESS NOTES
You have chosen to receive care through a telehealth visit.  Do you consent to use a video/audio connection for your medical care today? Yes  The ABCs of the Annual Wellness Visit  Subsequent Medicare Wellness Visit    Chief Complaint   Patient presents with   • Medicare Wellness-subsequent     AWV      Subjective    History of Present Illness:  Stacey Juarez is a 89 y.o. female who presents for a Subsequent Medicare Wellness Visit.    The following portions of the patient's history were reviewed and   updated as appropriate: allergies, current medications, past family history, past medical history, past social history, past surgical history and problem list.    Compared to one year ago, the patient feels her physical   health is the same.    Compared to one year ago, the patient feels her mental   health is better.    Recent Hospitalizations:  She was not admitted to the hospital during the last year.       Current Medical Providers:  Patient Care Team:  Jered Hewitt PA as PCP - General (Physician Assistant)    Outpatient Medications Prior to Visit   Medication Sig Dispense Refill   • albuterol sulfate  (90 Base) MCG/ACT inhaler INHALE 2 PUFFS BY MOUTH EVERY 4 TO 6 HOURS AS NEEDED FOR DIFFICULT BREATHING     • alendronate (FOSAMAX) 70 MG tablet TAKE 1 TABLET BY MOUTH 1 TIME A WEEK     • amLODIPine-benazepril (LOTREL 5-20) 5-20 MG per capsule TAKE ONE CAPSULE BY MOUTH EVERY DAY 90 capsule 1   • atorvastatin (LIPITOR) 40 MG tablet TAKE 1 TABLET BY MOUTH EVERY NIGHT AT BEDTIME 90 tablet 2   • budesonide (PULMICORT) 0.5 MG/2ML nebulizer solution Take 2 mL by nebulization 2 (two) times a day. 120 mL 11   • donepezil (ARICEPT) 5 MG tablet Take 1 tablet by mouth Every Night. 90 tablet 3   • Eliquis 2.5 MG tablet tablet TAKE 1 TABLET BY MOUTH TWICE DAILY 180 tablet 1   • FLUoxetine (PROzac) 10 MG capsule TAKE 1 CAPSULE(10 MG) BY MOUTH EVERY DAY 30 capsule 5   • MAGnesium-Oxide 400 (241.3 Mg) MG tablet tablet TAKE 1  TABLET TWICE DAILY 60 tablet 9   • metoprolol succinate XL (TOPROL-XL) 25 MG 24 hr tablet TAKE 1/2 TABLET BY MOUTH EVERY DAY OR AS DIRECTED 45 tablet 2   • montelukast (SINGULAIR) 10 MG tablet TAKE 1 TABLET BY MOUTH AT BEDTIME 30 tablet 3   • potassium chloride (KAYCIEL) 20 MEQ/15ML (10%) solution TAKE 15 ML BY MOUTH TWICE DAILY     • spironolactone (ALDACTONE) 25 MG tablet TAKE 1 TABLET BY MOUTH EVERY DAY 90 tablet 3   • Stiolto Respimat 2.5-2.5 MCG/ACT aerosol solution inhaler INHALE 2 PUFFS EVERY DAY 4 g 5   • ipratropium-albuterol (DUO-NEB) 0.5-2.5 mg/3 ml nebulizer Take 3 mL by nebulization 4 (Four) Times a Day for 120 doses. 3 mL 11     No facility-administered medications prior to visit.       No opioid medication identified on active medication list. I have reviewed chart for other potential  high risk medication/s and harmful drug interactions in the elderly.          Aspirin is not on active medication list.  Aspirin use is contraindicated for this patient due to: current use of Eliquis.  .    Patient Active Problem List   Diagnosis   • Alzheimer's disease (HCC)   • Pure hypercholesterolemia   • Essential hypertension   • Hypoxia   • Supplemental oxygen dependent     Advance Care Planning  Advance Directive is not on file.  ACP discussion was held with the patient during this visit. Patient does not have an advance directive, information provided.          Objective    Vitals:    21 1355   PainSc:   4   PainLoc: Generalized     BMI Readings from Last 1 Encounters:   21 24.80 kg/m²   BMI is within normal parameters. No follow-up required.    Does the patient have evidence of cognitive impairment? No              HEALTH RISK ASSESSMENT    Smoking Status:  Social History     Tobacco Use   Smoking Status Former Smoker   • Packs/day: 1.00   • Types: Cigarettes   • Start date: 1961   • Quit date: 2017   • Years since quittin.4   Smokeless Tobacco Never Used   Tobacco Comment    SMOKED  31 PLUS YEARS QUIT 1 YEAR AGO     Alcohol Consumption:  Social History     Substance and Sexual Activity   Alcohol Use Never     Fall Risk Screen:    MORROADI Fall Risk Assessment was completed, and patient is at LOW risk for falls.Assessment completed on:12/9/2021    Depression Screening:  PHQ-2/PHQ-9 Depression Screening 12/9/2021   Little interest or pleasure in doing things 0   Feeling down, depressed, or hopeless 0   Total Score 0       Health Habits and Functional and Cognitive Screening:  Functional & Cognitive Status 12/9/2021   Do you have difficulty preparing food and eating? No   Do you have difficulty bathing yourself, getting dressed or grooming yourself? No   Do you have difficulty using the toilet? No   Do you have difficulty moving around from place to place? No   Do you have trouble with steps or getting out of a bed or a chair? No   Current Diet Well Balanced Diet   Dental Exam Up to date   Eye Exam Up to date   Exercise (times per week) 0 times per week   Current Exercises Include No Regular Exercise   Do you need help using the phone?  No   Are you deaf or do you have serious difficulty hearing?  No   Do you need help with transportation? No   Do you need help shopping? No   Do you need help preparing meals?  No   Do you need help with housework?  No   Do you need help with laundry? No   Do you need help taking your medications? No   Do you need help managing money? No   Do you ever drive or ride in a car without wearing a seat belt? No   Have you felt unusual stress, anger or loneliness in the last month? No   Who do you live with? Alone   If you need help, do you have trouble finding someone available to you? No   Have you been bothered in the last four weeks by sexual problems? No   Do you have difficulty concentrating, remembering or making decisions? No       Age-appropriate Screening Schedule:  Refer to the list below for future screening recommendations based on patient's age, sex and/or  medical conditions. Orders for these recommended tests are listed in the plan section. The patient has been provided with a written plan.    Health Maintenance   Topic Date Due   • TDAP/TD VACCINES (1 - Tdap) Never done   • ZOSTER VACCINE (2 of 3) 11/26/2012   • DXA SCAN  10/09/2020   • INFLUENZA VACCINE  08/01/2021   • LIPID PANEL  04/23/2022              Assessment/Plan   CMS Preventative Services Quick Reference  Risk Factors Identified During Encounter  Inactivity/Sedentary  The above risks/problems have been discussed with the patient.  Follow up actions/plans if indicated are seen below in the Assessment/Plan Section.  Pertinent information has been shared with the patient in the After Visit Summary.    Diagnoses and all orders for this visit:    1. Medicare annual wellness visit, subsequent (Primary)        Follow Up:   Return in about 4 months (around 4/9/2022).     An After Visit Summary and PPPS were made available to the patient.            I have reviewed information obtained and documented by others and I have confirmed the accuracy of this documented note.    FREDA Jane

## 2022-01-01 ENCOUNTER — APPOINTMENT (OUTPATIENT)
Dept: CT IMAGING | Facility: HOSPITAL | Age: 87
End: 2022-01-01

## 2022-01-01 ENCOUNTER — APPOINTMENT (OUTPATIENT)
Dept: GENERAL RADIOLOGY | Facility: HOSPITAL | Age: 87
End: 2022-01-01

## 2022-01-01 ENCOUNTER — HOSPITAL ENCOUNTER (EMERGENCY)
Facility: HOSPITAL | Age: 87
Discharge: HOME | End: 2022-06-12
Attending: STUDENT IN AN ORGANIZED HEALTH CARE EDUCATION/TRAINING PROGRAM | Admitting: STUDENT IN AN ORGANIZED HEALTH CARE EDUCATION/TRAINING PROGRAM

## 2022-01-01 ENCOUNTER — APPOINTMENT (OUTPATIENT)
Dept: NEUROLOGY | Facility: HOSPITAL | Age: 87
End: 2022-01-01

## 2022-01-01 ENCOUNTER — APPOINTMENT (OUTPATIENT)
Dept: ULTRASOUND IMAGING | Facility: HOSPITAL | Age: 87
End: 2022-01-01

## 2022-01-01 ENCOUNTER — HOSPITAL ENCOUNTER (INPATIENT)
Facility: HOSPITAL | Age: 87
LOS: 6 days | Discharge: NURSING, REHAB OR LONG-TERM CARE | End: 2022-07-19
Attending: EMERGENCY MEDICINE | Admitting: INTERNAL MEDICINE

## 2022-01-01 ENCOUNTER — OFFICE VISIT (OUTPATIENT)
Dept: FAMILY MEDICINE CLINIC | Facility: CLINIC | Age: 87
End: 2022-01-01

## 2022-01-01 ENCOUNTER — APPOINTMENT (OUTPATIENT)
Dept: MRI IMAGING | Facility: HOSPITAL | Age: 87
End: 2022-01-01

## 2022-01-01 ENCOUNTER — TELEPHONE (OUTPATIENT)
Dept: FAMILY MEDICINE CLINIC | Facility: CLINIC | Age: 87
End: 2022-01-01

## 2022-01-01 ENCOUNTER — HOSPITAL ENCOUNTER (OUTPATIENT)
Dept: RESPIRATORY THERAPY | Facility: HOSPITAL | Age: 87
Discharge: HOME | End: 2022-06-20
Admitting: INTERNAL MEDICINE

## 2022-01-01 ENCOUNTER — LAB (OUTPATIENT)
Dept: LAB | Facility: HOSPITAL | Age: 87
End: 2022-01-01

## 2022-01-01 ENCOUNTER — HOSPITAL ENCOUNTER (INPATIENT)
Facility: HOSPITAL | Age: 87
LOS: 13 days | End: 2022-09-19
Attending: EMERGENCY MEDICINE

## 2022-01-01 ENCOUNTER — TELEPHONE (OUTPATIENT)
Dept: PULMONOLOGY | Facility: CLINIC | Age: 87
End: 2022-01-01

## 2022-01-01 ENCOUNTER — TELEPHONE (OUTPATIENT)
Dept: CARDIOLOGY | Facility: CLINIC | Age: 87
End: 2022-01-01

## 2022-01-01 ENCOUNTER — OFFICE VISIT (OUTPATIENT)
Dept: PULMONOLOGY | Facility: CLINIC | Age: 87
End: 2022-01-01

## 2022-01-01 ENCOUNTER — APPOINTMENT (OUTPATIENT)
Dept: CARDIOLOGY | Facility: HOSPITAL | Age: 87
End: 2022-01-01

## 2022-01-01 ENCOUNTER — OFFICE VISIT (OUTPATIENT)
Dept: CARDIOLOGY | Facility: CLINIC | Age: 87
End: 2022-01-01

## 2022-01-01 VITALS
HEART RATE: 117 BPM | OXYGEN SATURATION: 95 % | DIASTOLIC BLOOD PRESSURE: 79 MMHG | TEMPERATURE: 97.7 F | BODY MASS INDEX: 20.16 KG/M2 | RESPIRATION RATE: 20 BRPM | SYSTOLIC BLOOD PRESSURE: 108 MMHG | WEIGHT: 113.76 LBS | HEIGHT: 63 IN

## 2022-01-01 VITALS
WEIGHT: 146 LBS | HEART RATE: 84 BPM | OXYGEN SATURATION: 99 % | TEMPERATURE: 98.1 F | RESPIRATION RATE: 19 BRPM | BODY MASS INDEX: 25.87 KG/M2 | SYSTOLIC BLOOD PRESSURE: 107 MMHG | HEIGHT: 63 IN | DIASTOLIC BLOOD PRESSURE: 71 MMHG

## 2022-01-01 VITALS
DIASTOLIC BLOOD PRESSURE: 55 MMHG | SYSTOLIC BLOOD PRESSURE: 111 MMHG | HEART RATE: 53 BPM | BODY MASS INDEX: 26.22 KG/M2 | WEIGHT: 148 LBS | HEIGHT: 63 IN

## 2022-01-01 VITALS
HEART RATE: 73 BPM | SYSTOLIC BLOOD PRESSURE: 120 MMHG | DIASTOLIC BLOOD PRESSURE: 53 MMHG | OXYGEN SATURATION: 98 % | TEMPERATURE: 97.4 F | BODY MASS INDEX: 24.8 KG/M2 | WEIGHT: 139.99 LBS | RESPIRATION RATE: 18 BRPM | HEIGHT: 63 IN

## 2022-01-01 VITALS
HEIGHT: 63 IN | DIASTOLIC BLOOD PRESSURE: 53 MMHG | WEIGHT: 145.72 LBS | OXYGEN SATURATION: 94 % | TEMPERATURE: 98.6 F | SYSTOLIC BLOOD PRESSURE: 97 MMHG | HEART RATE: 75 BPM | RESPIRATION RATE: 16 BRPM | BODY MASS INDEX: 25.82 KG/M2

## 2022-01-01 VITALS
HEIGHT: 63 IN | SYSTOLIC BLOOD PRESSURE: 97 MMHG | TEMPERATURE: 97.5 F | BODY MASS INDEX: 25.91 KG/M2 | WEIGHT: 146.2 LBS | OXYGEN SATURATION: 100 % | DIASTOLIC BLOOD PRESSURE: 41 MMHG | HEART RATE: 43 BPM

## 2022-01-01 DIAGNOSIS — E83.42 HYPOMAGNESEMIA: ICD-10-CM

## 2022-01-01 DIAGNOSIS — F32.A DEPRESSION, UNSPECIFIED DEPRESSION TYPE: ICD-10-CM

## 2022-01-01 DIAGNOSIS — R77.8 ELEVATED TROPONIN: ICD-10-CM

## 2022-01-01 DIAGNOSIS — R06.09 DYSPNEA ON EXERTION: ICD-10-CM

## 2022-01-01 DIAGNOSIS — I10 ESSENTIAL HYPERTENSION: Primary | ICD-10-CM

## 2022-01-01 DIAGNOSIS — R55 NEAR SYNCOPE: Primary | ICD-10-CM

## 2022-01-01 DIAGNOSIS — I25.10 CORONARY ARTERY DISEASE INVOLVING NATIVE CORONARY ARTERY OF NATIVE HEART WITHOUT ANGINA PECTORIS: Primary | ICD-10-CM

## 2022-01-01 DIAGNOSIS — E78.2 MIXED HYPERLIPIDEMIA: ICD-10-CM

## 2022-01-01 DIAGNOSIS — I25.10 CORONARY ARTERY DISEASE INVOLVING NATIVE CORONARY ARTERY OF NATIVE HEART WITHOUT ANGINA PECTORIS: ICD-10-CM

## 2022-01-01 DIAGNOSIS — J44.9 ASTHMA-COPD OVERLAP SYNDROME: ICD-10-CM

## 2022-01-01 DIAGNOSIS — J44.1 COPD WITH ACUTE EXACERBATION: ICD-10-CM

## 2022-01-01 DIAGNOSIS — R53.83 OTHER FATIGUE: ICD-10-CM

## 2022-01-01 DIAGNOSIS — I48.91 ATRIAL FIBRILLATION, UNSPECIFIED TYPE: ICD-10-CM

## 2022-01-01 DIAGNOSIS — I48.0 PAROXYSMAL ATRIAL FIBRILLATION: Chronic | ICD-10-CM

## 2022-01-01 DIAGNOSIS — Z78.9 DECREASED ACTIVITIES OF DAILY LIVING (ADL): ICD-10-CM

## 2022-01-01 DIAGNOSIS — E55.9 VITAMIN D DEFICIENCY: ICD-10-CM

## 2022-01-01 DIAGNOSIS — I95.9 HYPOTENSION, UNSPECIFIED HYPOTENSION TYPE: ICD-10-CM

## 2022-01-01 DIAGNOSIS — E86.0 DEHYDRATION: ICD-10-CM

## 2022-01-01 DIAGNOSIS — M81.0 OSTEOPOROSIS WITHOUT CURRENT PATHOLOGICAL FRACTURE, UNSPECIFIED OSTEOPOROSIS TYPE: ICD-10-CM

## 2022-01-01 DIAGNOSIS — J44.1 COPD WITH ACUTE EXACERBATION: Primary | ICD-10-CM

## 2022-01-01 DIAGNOSIS — I10 ESSENTIAL HYPERTENSION: ICD-10-CM

## 2022-01-01 DIAGNOSIS — J44.1 COPD EXACERBATION: Primary | ICD-10-CM

## 2022-01-01 DIAGNOSIS — E78.2 MIXED HYPERLIPIDEMIA: Primary | ICD-10-CM

## 2022-01-01 DIAGNOSIS — E78.00 PURE HYPERCHOLESTEROLEMIA: ICD-10-CM

## 2022-01-01 DIAGNOSIS — D50.9 IRON DEFICIENCY ANEMIA, UNSPECIFIED IRON DEFICIENCY ANEMIA TYPE: Primary | ICD-10-CM

## 2022-01-01 DIAGNOSIS — J44.9 CHRONIC OBSTRUCTIVE PULMONARY DISEASE, UNSPECIFIED COPD TYPE: Chronic | ICD-10-CM

## 2022-01-01 DIAGNOSIS — E61.1 IRON DEFICIENCY: Primary | ICD-10-CM

## 2022-01-01 DIAGNOSIS — I48.0 PAROXYSMAL ATRIAL FIBRILLATION: ICD-10-CM

## 2022-01-01 DIAGNOSIS — E87.5 HYPERKALEMIA: ICD-10-CM

## 2022-01-01 DIAGNOSIS — R41.82 ALTERED MENTAL STATUS, UNSPECIFIED ALTERED MENTAL STATUS TYPE: Primary | ICD-10-CM

## 2022-01-01 DIAGNOSIS — R51.9 HEADACHE, UNSPECIFIED HEADACHE TYPE: ICD-10-CM

## 2022-01-01 DIAGNOSIS — R06.02 SOB (SHORTNESS OF BREATH): ICD-10-CM

## 2022-01-01 DIAGNOSIS — R13.12 OROPHARYNGEAL DYSPHAGIA: ICD-10-CM

## 2022-01-01 DIAGNOSIS — E53.8 VITAMIN B12 DEFICIENCY: Primary | ICD-10-CM

## 2022-01-01 DIAGNOSIS — J96.11 CHRONIC RESPIRATORY FAILURE WITH HYPOXIA: ICD-10-CM

## 2022-01-01 DIAGNOSIS — R26.2 DIFFICULTY WALKING: ICD-10-CM

## 2022-01-01 DIAGNOSIS — Z99.81 SUPPLEMENTAL OXYGEN DEPENDENT: Chronic | ICD-10-CM

## 2022-01-01 LAB
25(OH)D3 SERPL-MCNC: 7.2 NG/ML (ref 30–100)
ABO GROUP BLD: NORMAL
ALBUMIN SERPL-MCNC: 2.2 G/DL (ref 3.5–5.2)
ALBUMIN SERPL-MCNC: 2.5 G/DL (ref 3.5–5.2)
ALBUMIN SERPL-MCNC: 2.8 G/DL (ref 3.5–5.2)
ALBUMIN SERPL-MCNC: 2.8 G/DL (ref 3.5–5.2)
ALBUMIN SERPL-MCNC: 2.9 G/DL (ref 3.5–5.2)
ALBUMIN SERPL-MCNC: 3 G/DL (ref 3.5–5.2)
ALBUMIN SERPL-MCNC: 3 G/DL (ref 3.5–5.2)
ALBUMIN SERPL-MCNC: 3.1 G/DL (ref 3.5–5.2)
ALBUMIN SERPL-MCNC: 3.2 G/DL (ref 3.5–5.2)
ALBUMIN SERPL-MCNC: 3.2 G/DL (ref 3.5–5.2)
ALBUMIN SERPL-MCNC: 3.5 G/DL (ref 3.5–5.2)
ALBUMIN SERPL-MCNC: 3.7 G/DL (ref 3.5–5.2)
ALBUMIN SERPL-MCNC: 3.9 G/DL (ref 3.5–5.2)
ALBUMIN SERPL-MCNC: 4.1 G/DL (ref 3.5–5.2)
ALBUMIN SERPL-MCNC: 4.1 G/DL (ref 3.5–5.2)
ALBUMIN SERPL-MCNC: 4.2 G/DL (ref 3.5–5.2)
ALBUMIN SERPL-MCNC: 4.4 G/DL (ref 3.5–5.2)
ALBUMIN/GLOB SERPL: 0.8 G/DL
ALBUMIN/GLOB SERPL: 1.1 G/DL
ALBUMIN/GLOB SERPL: 1.1 G/DL
ALBUMIN/GLOB SERPL: 1.3 G/DL
ALBUMIN/GLOB SERPL: 1.4 G/DL
ALP SERPL-CCNC: 100 U/L (ref 39–117)
ALP SERPL-CCNC: 111 U/L (ref 39–117)
ALP SERPL-CCNC: 117 U/L (ref 39–117)
ALP SERPL-CCNC: 61 U/L (ref 39–117)
ALP SERPL-CCNC: 68 U/L (ref 39–117)
ALP SERPL-CCNC: 70 U/L (ref 39–117)
ALP SERPL-CCNC: 72 U/L (ref 39–117)
ALP SERPL-CCNC: 73 U/L (ref 39–117)
ALP SERPL-CCNC: 73 U/L (ref 39–117)
ALP SERPL-CCNC: 74 U/L (ref 39–117)
ALP SERPL-CCNC: 80 U/L (ref 39–117)
ALP SERPL-CCNC: 81 U/L (ref 39–117)
ALP SERPL-CCNC: 83 U/L (ref 39–117)
ALP SERPL-CCNC: 92 U/L (ref 39–117)
ALP SERPL-CCNC: 93 U/L (ref 39–117)
ALP SERPL-CCNC: 96 U/L (ref 39–117)
ALP SERPL-CCNC: 97 U/L (ref 39–117)
ALT SERPL W P-5'-P-CCNC: 11 U/L (ref 1–33)
ALT SERPL W P-5'-P-CCNC: 12 U/L (ref 1–33)
ALT SERPL W P-5'-P-CCNC: 14 U/L (ref 1–33)
ALT SERPL W P-5'-P-CCNC: 15 U/L (ref 1–33)
ALT SERPL W P-5'-P-CCNC: 16 U/L (ref 1–33)
ALT SERPL W P-5'-P-CCNC: 22 U/L (ref 1–33)
ALT SERPL W P-5'-P-CCNC: 28 U/L (ref 1–33)
ALT SERPL W P-5'-P-CCNC: 46 U/L (ref 1–33)
ALT SERPL W P-5'-P-CCNC: 54 U/L (ref 1–33)
ALT SERPL W P-5'-P-CCNC: 60 U/L (ref 1–33)
ALT SERPL W P-5'-P-CCNC: 9 U/L (ref 1–33)
ALT SERPL W P-5'-P-CCNC: <5 U/L (ref 1–33)
AMMONIA BLD-SCNC: 13 UMOL/L (ref 11–51)
ANION GAP SERPL CALCULATED.3IONS-SCNC: 10 MMOL/L (ref 5–15)
ANION GAP SERPL CALCULATED.3IONS-SCNC: 10.1 MMOL/L (ref 5–15)
ANION GAP SERPL CALCULATED.3IONS-SCNC: 10.2 MMOL/L (ref 5–15)
ANION GAP SERPL CALCULATED.3IONS-SCNC: 10.7 MMOL/L (ref 5–15)
ANION GAP SERPL CALCULATED.3IONS-SCNC: 10.8 MMOL/L (ref 5–15)
ANION GAP SERPL CALCULATED.3IONS-SCNC: 11.8 MMOL/L (ref 5–15)
ANION GAP SERPL CALCULATED.3IONS-SCNC: 12.4 MMOL/L (ref 5–15)
ANION GAP SERPL CALCULATED.3IONS-SCNC: 12.8 MMOL/L (ref 5–15)
ANION GAP SERPL CALCULATED.3IONS-SCNC: 13.1 MMOL/L (ref 5–15)
ANION GAP SERPL CALCULATED.3IONS-SCNC: 13.5 MMOL/L (ref 5–15)
ANION GAP SERPL CALCULATED.3IONS-SCNC: 13.6 MMOL/L (ref 5–15)
ANION GAP SERPL CALCULATED.3IONS-SCNC: 15.8 MMOL/L (ref 5–15)
ANION GAP SERPL CALCULATED.3IONS-SCNC: 5.7 MMOL/L (ref 5–15)
ANION GAP SERPL CALCULATED.3IONS-SCNC: 6.2 MMOL/L (ref 5–15)
ANION GAP SERPL CALCULATED.3IONS-SCNC: 6.6 MMOL/L (ref 5–15)
ANION GAP SERPL CALCULATED.3IONS-SCNC: 6.8 MMOL/L (ref 5–15)
ANION GAP SERPL CALCULATED.3IONS-SCNC: 7.2 MMOL/L (ref 5–15)
ANION GAP SERPL CALCULATED.3IONS-SCNC: 7.5 MMOL/L (ref 5–15)
ANION GAP SERPL CALCULATED.3IONS-SCNC: 7.8 MMOL/L (ref 5–15)
ANION GAP SERPL CALCULATED.3IONS-SCNC: 7.8 MMOL/L (ref 5–15)
ANION GAP SERPL CALCULATED.3IONS-SCNC: 8.5 MMOL/L (ref 5–15)
ANION GAP SERPL CALCULATED.3IONS-SCNC: 9.1 MMOL/L (ref 5–15)
ANION GAP SERPL CALCULATED.3IONS-SCNC: 9.6 MMOL/L (ref 5–15)
ANION GAP SERPL CALCULATED.3IONS-SCNC: 9.7 MMOL/L (ref 5–15)
ANION GAP SERPL CALCULATED.3IONS-SCNC: 9.8 MMOL/L (ref 5–15)
ASCENDING AORTA: 4.2 CM
AST SERPL-CCNC: 12 U/L (ref 1–32)
AST SERPL-CCNC: 14 U/L (ref 1–32)
AST SERPL-CCNC: 14 U/L (ref 1–32)
AST SERPL-CCNC: 16 U/L (ref 1–32)
AST SERPL-CCNC: 16 U/L (ref 1–32)
AST SERPL-CCNC: 17 U/L (ref 1–32)
AST SERPL-CCNC: 17 U/L (ref 1–32)
AST SERPL-CCNC: 18 U/L (ref 1–32)
AST SERPL-CCNC: 22 U/L (ref 1–32)
AST SERPL-CCNC: 22 U/L (ref 1–32)
AST SERPL-CCNC: 234 U/L (ref 1–32)
AST SERPL-CCNC: 24 U/L (ref 1–32)
AST SERPL-CCNC: 27 U/L (ref 1–32)
AST SERPL-CCNC: 29 U/L (ref 1–32)
AST SERPL-CCNC: 76 U/L (ref 1–32)
AST SERPL-CCNC: 81 U/L (ref 1–32)
AST SERPL-CCNC: 87 U/L (ref 1–32)
BACTERIA SPEC AEROBE CULT: ABNORMAL
BACTERIA SPEC AEROBE CULT: NO GROWTH
BACTERIA SPEC AEROBE CULT: NORMAL
BACTERIA UR QL AUTO: ABNORMAL /HPF
BACTERIA UR QL AUTO: ABNORMAL /HPF
BASE EXCESS BLDV CALC-SCNC: -0.6 MMOL/L (ref -2–2)
BASOPHILS # BLD AUTO: 0.01 10*3/MM3 (ref 0–0.2)
BASOPHILS # BLD AUTO: 0.02 10*3/MM3 (ref 0–0.2)
BASOPHILS # BLD AUTO: 0.03 10*3/MM3 (ref 0–0.2)
BASOPHILS # BLD AUTO: 0.04 10*3/MM3 (ref 0–0.2)
BASOPHILS # BLD AUTO: 0.05 10*3/MM3 (ref 0–0.2)
BASOPHILS NFR BLD AUTO: 0.1 % (ref 0–1.5)
BASOPHILS NFR BLD AUTO: 0.2 % (ref 0–1.5)
BASOPHILS NFR BLD AUTO: 0.3 % (ref 0–1.5)
BASOPHILS NFR BLD AUTO: 0.4 % (ref 0–1.5)
BASOPHILS NFR BLD AUTO: 0.4 % (ref 0–1.5)
BASOPHILS NFR BLD AUTO: 0.6 % (ref 0–1.5)
BDY SITE: ABNORMAL
BH BB BLOOD EXPIRATION DATE: NORMAL
BH BB BLOOD EXPIRATION DATE: NORMAL
BH BB BLOOD TYPE BARCODE: 7300
BH BB BLOOD TYPE BARCODE: 7300
BH BB DISPENSE STATUS: NORMAL
BH BB DISPENSE STATUS: NORMAL
BH BB PRODUCT CODE: NORMAL
BH BB PRODUCT CODE: NORMAL
BH BB UNIT NUMBER: NORMAL
BH BB UNIT NUMBER: NORMAL
BH CV ECHO MEAS - AO ROOT DIAM: 2.6 CM
BH CV ECHO MEAS - EDV(MOD-SP2): 38.9 ML
BH CV ECHO MEAS - EDV(MOD-SP4): 41.6 ML
BH CV ECHO MEAS - EF(MOD-BP): 65 %
BH CV ECHO MEAS - ESV(MOD-SP2): 13.4 ML
BH CV ECHO MEAS - ESV(MOD-SP4): 14.5 ML
BH CV ECHO MEAS - IVSD: 1.2 CM
BH CV ECHO MEAS - LA A2CS (ATRIAL LENGTH): 5.2 CM
BH CV ECHO MEAS - LA A4C LENGTH: 5.3 CM
BH CV ECHO MEAS - LA DIMENSION: 2.9 CM
BH CV ECHO MEAS - LAT PEAK E' VEL: 7.3 CM/SEC
BH CV ECHO MEAS - LVIDD: 3.5 CM
BH CV ECHO MEAS - LVIDS: 2.3 CM
BH CV ECHO MEAS - LVOT DIAM: 2 CM
BH CV ECHO MEAS - LVPWD: 1.1 CM
BH CV ECHO MEAS - MED PEAK E' VEL: 6.1 CM/SEC
BH CV ECHO MEAS - MV A MAX VEL: 110 CM/SEC
BH CV ECHO MEAS - MV DEC TIME: 348 MSEC
BH CV ECHO MEAS - MV E MAX VEL: 67.9 CM/SEC
BH CV ECHO MEAS - MV E/A: 0.6
BH CV ECHO MEAS - PA V2 MAX: 58 CM/SEC
BH CV ECHO MEAS - RAP SYSTOLE: 3 MMHG
BH CV ECHO MEAS - RVSP: 44 MMHG
BH CV ECHO MEAS - TR MAX PG: 41 MMHG
BH CV ECHO MEAS - TR MAX VEL: 316 CM/SEC
BH CV ECHO MEASUREMENTS AVERAGE E/E' RATIO: 10.13
BILIRUB CONJ SERPL-MCNC: 0.2 MG/DL (ref 0–0.3)
BILIRUB CONJ SERPL-MCNC: 0.3 MG/DL (ref 0–0.3)
BILIRUB CONJ SERPL-MCNC: <0.2 MG/DL (ref 0–0.3)
BILIRUB CONJ SERPL-MCNC: <0.2 MG/DL (ref 0–0.3)
BILIRUB INDIRECT SERPL-MCNC: 0.3 MG/DL
BILIRUB INDIRECT SERPL-MCNC: 0.4 MG/DL
BILIRUB INDIRECT SERPL-MCNC: 0.5 MG/DL
BILIRUB INDIRECT SERPL-MCNC: 0.5 MG/DL
BILIRUB INDIRECT SERPL-MCNC: 0.6 MG/DL
BILIRUB INDIRECT SERPL-MCNC: 0.6 MG/DL
BILIRUB INDIRECT SERPL-MCNC: ABNORMAL MG/DL
BILIRUB INDIRECT SERPL-MCNC: ABNORMAL MG/DL
BILIRUB SERPL-MCNC: 0.3 MG/DL (ref 0–1.2)
BILIRUB SERPL-MCNC: 0.4 MG/DL (ref 0–1.2)
BILIRUB SERPL-MCNC: 0.5 MG/DL (ref 0–1.2)
BILIRUB SERPL-MCNC: 0.6 MG/DL (ref 0–1.2)
BILIRUB SERPL-MCNC: 0.7 MG/DL (ref 0–1.2)
BILIRUB SERPL-MCNC: 0.8 MG/DL (ref 0–1.2)
BILIRUB SERPL-MCNC: 1 MG/DL (ref 0–1.2)
BILIRUB SERPL-MCNC: 1.1 MG/DL (ref 0–1.2)
BILIRUB UR QL STRIP: ABNORMAL
BILIRUB UR QL STRIP: NEGATIVE
BILIRUB UR QL STRIP: NEGATIVE
BLD GP AB SCN SERPL QL: NEGATIVE
BLD GP AB SCN SERPL QL: NEGATIVE
BUN SERPL-MCNC: 13 MG/DL (ref 8–23)
BUN SERPL-MCNC: 14 MG/DL (ref 8–23)
BUN SERPL-MCNC: 15 MG/DL (ref 8–23)
BUN SERPL-MCNC: 16 MG/DL (ref 8–23)
BUN SERPL-MCNC: 16 MG/DL (ref 8–23)
BUN SERPL-MCNC: 18 MG/DL (ref 8–23)
BUN SERPL-MCNC: 18 MG/DL (ref 8–23)
BUN SERPL-MCNC: 19 MG/DL (ref 8–23)
BUN SERPL-MCNC: 20 MG/DL (ref 8–23)
BUN SERPL-MCNC: 20 MG/DL (ref 8–23)
BUN SERPL-MCNC: 21 MG/DL (ref 8–23)
BUN SERPL-MCNC: 22 MG/DL (ref 8–23)
BUN SERPL-MCNC: 22 MG/DL (ref 8–23)
BUN SERPL-MCNC: 24 MG/DL (ref 8–23)
BUN SERPL-MCNC: 25 MG/DL (ref 8–23)
BUN SERPL-MCNC: 27 MG/DL (ref 8–23)
BUN SERPL-MCNC: 28 MG/DL (ref 8–23)
BUN SERPL-MCNC: 31 MG/DL (ref 8–23)
BUN SERPL-MCNC: 33 MG/DL (ref 8–23)
BUN SERPL-MCNC: 34 MG/DL (ref 8–23)
BUN SERPL-MCNC: 41 MG/DL (ref 8–23)
BUN/CREAT SERPL: 11.2 (ref 7–25)
BUN/CREAT SERPL: 11.9 (ref 7–25)
BUN/CREAT SERPL: 13.6 (ref 7–25)
BUN/CREAT SERPL: 14.4 (ref 7–25)
BUN/CREAT SERPL: 15.2 (ref 7–25)
BUN/CREAT SERPL: 15.2 (ref 7–25)
BUN/CREAT SERPL: 15.6 (ref 7–25)
BUN/CREAT SERPL: 16.1 (ref 7–25)
BUN/CREAT SERPL: 16.9 (ref 7–25)
BUN/CREAT SERPL: 17.7 (ref 7–25)
BUN/CREAT SERPL: 17.9 (ref 7–25)
BUN/CREAT SERPL: 18.1 (ref 7–25)
BUN/CREAT SERPL: 18.2 (ref 7–25)
BUN/CREAT SERPL: 19 (ref 7–25)
BUN/CREAT SERPL: 19.5 (ref 7–25)
BUN/CREAT SERPL: 20 (ref 7–25)
BUN/CREAT SERPL: 20 (ref 7–25)
BUN/CREAT SERPL: 20.4 (ref 7–25)
BUN/CREAT SERPL: 20.9 (ref 7–25)
BUN/CREAT SERPL: 21.1 (ref 7–25)
BUN/CREAT SERPL: 21.6 (ref 7–25)
BUN/CREAT SERPL: 22.2 (ref 7–25)
BUN/CREAT SERPL: 22.8 (ref 7–25)
CALCIUM SPEC-SCNC: 10.4 MG/DL (ref 8.2–9.6)
CALCIUM SPEC-SCNC: 10.5 MG/DL (ref 8.2–9.6)
CALCIUM SPEC-SCNC: 10.5 MG/DL (ref 8.2–9.6)
CALCIUM SPEC-SCNC: 8.1 MG/DL (ref 8.2–9.6)
CALCIUM SPEC-SCNC: 8.7 MG/DL (ref 8.2–9.6)
CALCIUM SPEC-SCNC: 8.9 MG/DL (ref 8.2–9.6)
CALCIUM SPEC-SCNC: 9 MG/DL (ref 8.2–9.6)
CALCIUM SPEC-SCNC: 9.1 MG/DL (ref 8.2–9.6)
CALCIUM SPEC-SCNC: 9.2 MG/DL (ref 8.2–9.6)
CALCIUM SPEC-SCNC: 9.2 MG/DL (ref 8.2–9.6)
CALCIUM SPEC-SCNC: 9.3 MG/DL (ref 8.2–9.6)
CALCIUM SPEC-SCNC: 9.4 MG/DL (ref 8.2–9.6)
CALCIUM SPEC-SCNC: 9.4 MG/DL (ref 8.2–9.6)
CALCIUM SPEC-SCNC: 9.6 MG/DL (ref 8.2–9.6)
CALCIUM SPEC-SCNC: 9.6 MG/DL (ref 8.2–9.6)
CALCIUM SPEC-SCNC: 9.7 MG/DL (ref 8.2–9.6)
CALCIUM SPEC-SCNC: 9.8 MG/DL (ref 8.2–9.6)
CALCIUM SPEC-SCNC: 9.9 MG/DL (ref 8.2–9.6)
CHLORIDE SERPL-SCNC: 100 MMOL/L (ref 98–107)
CHLORIDE SERPL-SCNC: 101 MMOL/L (ref 98–107)
CHLORIDE SERPL-SCNC: 102 MMOL/L (ref 98–107)
CHLORIDE SERPL-SCNC: 103 MMOL/L (ref 98–107)
CHLORIDE SERPL-SCNC: 104 MMOL/L (ref 98–107)
CHLORIDE SERPL-SCNC: 105 MMOL/L (ref 98–107)
CHLORIDE SERPL-SCNC: 105 MMOL/L (ref 98–107)
CHLORIDE SERPL-SCNC: 106 MMOL/L (ref 98–107)
CHLORIDE SERPL-SCNC: 106 MMOL/L (ref 98–107)
CHLORIDE SERPL-SCNC: 108 MMOL/L (ref 98–107)
CHLORIDE SERPL-SCNC: 108 MMOL/L (ref 98–107)
CHLORIDE SERPL-SCNC: 109 MMOL/L (ref 98–107)
CHLORIDE SERPL-SCNC: 111 MMOL/L (ref 98–107)
CHLORIDE SERPL-SCNC: 113 MMOL/L (ref 98–107)
CHLORIDE SERPL-SCNC: 99 MMOL/L (ref 98–107)
CHLORIDE SERPL-SCNC: 99 MMOL/L (ref 98–107)
CHOLEST SERPL-MCNC: 168 MG/DL (ref 0–200)
CHOLEST SERPL-MCNC: 198 MG/DL (ref 0–200)
CLARITY UR: ABNORMAL
CLARITY UR: ABNORMAL
CLARITY UR: CLEAR
CO2 SERPL-SCNC: 20.2 MMOL/L (ref 22–29)
CO2 SERPL-SCNC: 21.2 MMOL/L (ref 22–29)
CO2 SERPL-SCNC: 22.6 MMOL/L (ref 22–29)
CO2 SERPL-SCNC: 22.9 MMOL/L (ref 22–29)
CO2 SERPL-SCNC: 23.4 MMOL/L (ref 22–29)
CO2 SERPL-SCNC: 24.3 MMOL/L (ref 22–29)
CO2 SERPL-SCNC: 25.8 MMOL/L (ref 22–29)
CO2 SERPL-SCNC: 25.9 MMOL/L (ref 22–29)
CO2 SERPL-SCNC: 26.2 MMOL/L (ref 22–29)
CO2 SERPL-SCNC: 26.5 MMOL/L (ref 22–29)
CO2 SERPL-SCNC: 27 MMOL/L (ref 22–29)
CO2 SERPL-SCNC: 27.2 MMOL/L (ref 22–29)
CO2 SERPL-SCNC: 27.2 MMOL/L (ref 22–29)
CO2 SERPL-SCNC: 27.4 MMOL/L (ref 22–29)
CO2 SERPL-SCNC: 27.5 MMOL/L (ref 22–29)
CO2 SERPL-SCNC: 27.5 MMOL/L (ref 22–29)
CO2 SERPL-SCNC: 27.8 MMOL/L (ref 22–29)
CO2 SERPL-SCNC: 28.2 MMOL/L (ref 22–29)
CO2 SERPL-SCNC: 28.2 MMOL/L (ref 22–29)
CO2 SERPL-SCNC: 28.3 MMOL/L (ref 22–29)
CO2 SERPL-SCNC: 28.8 MMOL/L (ref 22–29)
CO2 SERPL-SCNC: 29.3 MMOL/L (ref 22–29)
CO2 SERPL-SCNC: 29.9 MMOL/L (ref 22–29)
CO2 SERPL-SCNC: 31.4 MMOL/L (ref 22–29)
CO2 SERPL-SCNC: 35.2 MMOL/L (ref 22–29)
COHGB MFR BLD: 2.6 % (ref 0–1.5)
COLOR UR: ABNORMAL
COLOR UR: ABNORMAL
COLOR UR: YELLOW
CREAT SERPL-MCNC: 0.83 MG/DL (ref 0.57–1)
CREAT SERPL-MCNC: 0.84 MG/DL (ref 0.57–1)
CREAT SERPL-MCNC: 0.84 MG/DL (ref 0.57–1)
CREAT SERPL-MCNC: 0.9 MG/DL (ref 0.57–1)
CREAT SERPL-MCNC: 0.93 MG/DL (ref 0.57–1)
CREAT SERPL-MCNC: 0.93 MG/DL (ref 0.57–1)
CREAT SERPL-MCNC: 0.96 MG/DL (ref 0.57–1)
CREAT SERPL-MCNC: 0.97 MG/DL (ref 0.57–1)
CREAT SERPL-MCNC: 0.99 MG/DL (ref 0.57–1)
CREAT SERPL-MCNC: 0.99 MG/DL (ref 0.57–1)
CREAT SERPL-MCNC: 1.05 MG/DL (ref 0.57–1)
CREAT SERPL-MCNC: 1.1 MG/DL (ref 0.57–1)
CREAT SERPL-MCNC: 1.1 MG/DL (ref 0.57–1)
CREAT SERPL-MCNC: 1.21 MG/DL (ref 0.57–1)
CREAT SERPL-MCNC: 1.34 MG/DL (ref 0.57–1)
CREAT SERPL-MCNC: 1.36 MG/DL (ref 0.57–1)
CREAT SERPL-MCNC: 1.37 MG/DL (ref 0.57–1)
CREAT SERPL-MCNC: 1.49 MG/DL (ref 0.57–1)
CREAT SERPL-MCNC: 1.61 MG/DL (ref 0.57–1)
CREAT SERPL-MCNC: 1.69 MG/DL (ref 0.57–1)
CREAT SERPL-MCNC: 1.78 MG/DL (ref 0.57–1)
CREAT SERPL-MCNC: 2.01 MG/DL (ref 0.57–1)
CREAT SERPL-MCNC: 2.32 MG/DL (ref 0.57–1)
CROSSMATCH INTERPRETATION: NORMAL
CROSSMATCH INTERPRETATION: NORMAL
D-LACTATE SERPL-SCNC: 1.4 MMOL/L (ref 0.5–2)
DEPRECATED RDW RBC AUTO: 41 FL (ref 37–54)
DEPRECATED RDW RBC AUTO: 44.3 FL (ref 37–54)
DEPRECATED RDW RBC AUTO: 44.8 FL (ref 37–54)
DEPRECATED RDW RBC AUTO: 48.2 FL (ref 37–54)
DEPRECATED RDW RBC AUTO: 49.6 FL (ref 37–54)
DEPRECATED RDW RBC AUTO: 49.6 FL (ref 37–54)
DEPRECATED RDW RBC AUTO: 50.5 FL (ref 37–54)
DEPRECATED RDW RBC AUTO: 51.5 FL (ref 37–54)
DEPRECATED RDW RBC AUTO: 55 FL (ref 37–54)
DEPRECATED RDW RBC AUTO: 55.4 FL (ref 37–54)
DEPRECATED RDW RBC AUTO: 56.6 FL (ref 37–54)
DEPRECATED RDW RBC AUTO: 56.6 FL (ref 37–54)
DEPRECATED RDW RBC AUTO: 56.8 FL (ref 37–54)
DEPRECATED RDW RBC AUTO: 56.9 FL (ref 37–54)
DEPRECATED RDW RBC AUTO: 57 FL (ref 37–54)
DEPRECATED RDW RBC AUTO: 57.1 FL (ref 37–54)
DEPRECATED RDW RBC AUTO: 57.1 FL (ref 37–54)
DEPRECATED RDW RBC AUTO: 58 FL (ref 37–54)
DEPRECATED RDW RBC AUTO: 58.4 FL (ref 37–54)
DEPRECATED RDW RBC AUTO: 59.2 FL (ref 37–54)
DEPRECATED RDW RBC AUTO: 59.2 FL (ref 37–54)
DEPRECATED RDW RBC AUTO: 59.4 FL (ref 37–54)
DEPRECATED RDW RBC AUTO: 62.3 FL (ref 37–54)
DEPRECATED RDW RBC AUTO: 63.2 FL (ref 37–54)
EGFRCR SERPLBLD CKD-EPI 2021: 19.5 ML/MIN/1.73
EGFRCR SERPLBLD CKD-EPI 2021: 23.2 ML/MIN/1.73
EGFRCR SERPLBLD CKD-EPI 2021: 26.8 ML/MIN/1.73
EGFRCR SERPLBLD CKD-EPI 2021: 28.6 ML/MIN/1.73
EGFRCR SERPLBLD CKD-EPI 2021: 30.3 ML/MIN/1.73
EGFRCR SERPLBLD CKD-EPI 2021: 33.2 ML/MIN/1.73
EGFRCR SERPLBLD CKD-EPI 2021: 36.8 ML/MIN/1.73
EGFRCR SERPLBLD CKD-EPI 2021: 37.1 ML/MIN/1.73
EGFRCR SERPLBLD CKD-EPI 2021: 42.7 ML/MIN/1.73
EGFRCR SERPLBLD CKD-EPI 2021: 47.8 ML/MIN/1.73
EGFRCR SERPLBLD CKD-EPI 2021: 47.8 ML/MIN/1.73
EGFRCR SERPLBLD CKD-EPI 2021: 50.6 ML/MIN/1.73
EGFRCR SERPLBLD CKD-EPI 2021: 54.3 ML/MIN/1.73
EGFRCR SERPLBLD CKD-EPI 2021: 54.3 ML/MIN/1.73
EGFRCR SERPLBLD CKD-EPI 2021: 55.6 ML/MIN/1.73
EGFRCR SERPLBLD CKD-EPI 2021: 56.3 ML/MIN/1.73
EGFRCR SERPLBLD CKD-EPI 2021: 58.5 ML/MIN/1.73
EGFRCR SERPLBLD CKD-EPI 2021: 58.5 ML/MIN/1.73
EGFRCR SERPLBLD CKD-EPI 2021: 60.9 ML/MIN/1.73
EGFRCR SERPLBLD CKD-EPI 2021: 66.1 ML/MIN/1.73
EGFRCR SERPLBLD CKD-EPI 2021: 66.1 ML/MIN/1.73
EGFRCR SERPLBLD CKD-EPI 2021: 67.1 ML/MIN/1.73
EOSINOPHIL # BLD AUTO: 0.01 10*3/MM3 (ref 0–0.4)
EOSINOPHIL # BLD AUTO: 0.02 10*3/MM3 (ref 0–0.4)
EOSINOPHIL # BLD AUTO: 0.02 10*3/MM3 (ref 0–0.4)
EOSINOPHIL # BLD AUTO: 0.03 10*3/MM3 (ref 0–0.4)
EOSINOPHIL # BLD AUTO: 0.04 10*3/MM3 (ref 0–0.4)
EOSINOPHIL # BLD AUTO: 0.04 10*3/MM3 (ref 0–0.4)
EOSINOPHIL # BLD AUTO: 0.05 10*3/MM3 (ref 0–0.4)
EOSINOPHIL # BLD AUTO: 0.07 10*3/MM3 (ref 0–0.4)
EOSINOPHIL # BLD AUTO: 0.07 10*3/MM3 (ref 0–0.4)
EOSINOPHIL # BLD AUTO: 0.08 10*3/MM3 (ref 0–0.4)
EOSINOPHIL # BLD AUTO: 0.11 10*3/MM3 (ref 0–0.4)
EOSINOPHIL # BLD AUTO: 0.15 10*3/MM3 (ref 0–0.4)
EOSINOPHIL NFR BLD AUTO: 0.1 % (ref 0.3–6.2)
EOSINOPHIL NFR BLD AUTO: 0.1 % (ref 0.3–6.2)
EOSINOPHIL NFR BLD AUTO: 0.2 % (ref 0.3–6.2)
EOSINOPHIL NFR BLD AUTO: 0.3 % (ref 0.3–6.2)
EOSINOPHIL NFR BLD AUTO: 0.4 % (ref 0.3–6.2)
EOSINOPHIL NFR BLD AUTO: 0.5 % (ref 0.3–6.2)
EOSINOPHIL NFR BLD AUTO: 0.6 % (ref 0.3–6.2)
EOSINOPHIL NFR BLD AUTO: 0.7 % (ref 0.3–6.2)
EOSINOPHIL NFR BLD AUTO: 0.8 % (ref 0.3–6.2)
EOSINOPHIL NFR BLD AUTO: 0.8 % (ref 0.3–6.2)
EOSINOPHIL NFR BLD AUTO: 0.9 % (ref 0.3–6.2)
EOSINOPHIL NFR BLD AUTO: 1 % (ref 0.3–6.2)
EOSINOPHIL NFR BLD AUTO: 1 % (ref 0.3–6.2)
EOSINOPHIL NFR BLD AUTO: 1.4 % (ref 0.3–6.2)
ERYTHROCYTE [DISTWIDTH] IN BLOOD BY AUTOMATED COUNT: 13.6 % (ref 12.3–15.4)
ERYTHROCYTE [DISTWIDTH] IN BLOOD BY AUTOMATED COUNT: 13.6 % (ref 12.3–15.4)
ERYTHROCYTE [DISTWIDTH] IN BLOOD BY AUTOMATED COUNT: 14.4 % (ref 12.3–15.4)
ERYTHROCYTE [DISTWIDTH] IN BLOOD BY AUTOMATED COUNT: 15.4 % (ref 12.3–15.4)
ERYTHROCYTE [DISTWIDTH] IN BLOOD BY AUTOMATED COUNT: 15.5 % (ref 12.3–15.4)
ERYTHROCYTE [DISTWIDTH] IN BLOOD BY AUTOMATED COUNT: 15.5 % (ref 12.3–15.4)
ERYTHROCYTE [DISTWIDTH] IN BLOOD BY AUTOMATED COUNT: 15.6 % (ref 12.3–15.4)
ERYTHROCYTE [DISTWIDTH] IN BLOOD BY AUTOMATED COUNT: 15.9 % (ref 12.3–15.4)
ERYTHROCYTE [DISTWIDTH] IN BLOOD BY AUTOMATED COUNT: 17.2 % (ref 12.3–15.4)
ERYTHROCYTE [DISTWIDTH] IN BLOOD BY AUTOMATED COUNT: 17.3 % (ref 12.3–15.4)
ERYTHROCYTE [DISTWIDTH] IN BLOOD BY AUTOMATED COUNT: 17.6 % (ref 12.3–15.4)
ERYTHROCYTE [DISTWIDTH] IN BLOOD BY AUTOMATED COUNT: 17.6 % (ref 12.3–15.4)
ERYTHROCYTE [DISTWIDTH] IN BLOOD BY AUTOMATED COUNT: 17.7 % (ref 12.3–15.4)
ERYTHROCYTE [DISTWIDTH] IN BLOOD BY AUTOMATED COUNT: 17.8 % (ref 12.3–15.4)
ERYTHROCYTE [DISTWIDTH] IN BLOOD BY AUTOMATED COUNT: 17.8 % (ref 12.3–15.4)
ERYTHROCYTE [DISTWIDTH] IN BLOOD BY AUTOMATED COUNT: 17.9 % (ref 12.3–15.4)
ERYTHROCYTE [DISTWIDTH] IN BLOOD BY AUTOMATED COUNT: 17.9 % (ref 12.3–15.4)
ERYTHROCYTE [DISTWIDTH] IN BLOOD BY AUTOMATED COUNT: 18 % (ref 12.3–15.4)
ERYTHROCYTE [DISTWIDTH] IN BLOOD BY AUTOMATED COUNT: 18.1 % (ref 12.3–15.4)
ERYTHROCYTE [DISTWIDTH] IN BLOOD BY AUTOMATED COUNT: 18.2 % (ref 12.3–15.4)
ERYTHROCYTE [DISTWIDTH] IN BLOOD BY AUTOMATED COUNT: 18.5 % (ref 12.3–15.4)
ERYTHROCYTE [DISTWIDTH] IN BLOOD BY AUTOMATED COUNT: 18.6 % (ref 12.3–15.4)
FHHB: 9.9 % (ref 0–5)
FOLATE SERPL-MCNC: 2.69 NG/ML (ref 4.78–24.2)
FOLATE SERPL-MCNC: 6.68 NG/ML (ref 4.78–24.2)
GFR SERPL CREATININE-BSD FRML MDRD: 45 ML/MIN/1.73
GLOBULIN UR ELPH-MCNC: 2.9 GM/DL
GLOBULIN UR ELPH-MCNC: 2.9 GM/DL
GLOBULIN UR ELPH-MCNC: 3 GM/DL
GLOBULIN UR ELPH-MCNC: 3.1 GM/DL
GLOBULIN UR ELPH-MCNC: 3.7 GM/DL
GLUCOSE BLDC GLUCOMTR-MCNC: 83 MG/DL (ref 70–99)
GLUCOSE SERPL-MCNC: 100 MG/DL (ref 65–99)
GLUCOSE SERPL-MCNC: 101 MG/DL (ref 65–99)
GLUCOSE SERPL-MCNC: 103 MG/DL (ref 65–99)
GLUCOSE SERPL-MCNC: 106 MG/DL (ref 65–99)
GLUCOSE SERPL-MCNC: 108 MG/DL (ref 65–99)
GLUCOSE SERPL-MCNC: 109 MG/DL (ref 65–99)
GLUCOSE SERPL-MCNC: 113 MG/DL (ref 65–99)
GLUCOSE SERPL-MCNC: 115 MG/DL (ref 65–99)
GLUCOSE SERPL-MCNC: 119 MG/DL (ref 65–99)
GLUCOSE SERPL-MCNC: 121 MG/DL (ref 65–99)
GLUCOSE SERPL-MCNC: 70 MG/DL (ref 65–99)
GLUCOSE SERPL-MCNC: 75 MG/DL (ref 65–99)
GLUCOSE SERPL-MCNC: 75 MG/DL (ref 65–99)
GLUCOSE SERPL-MCNC: 76 MG/DL (ref 65–99)
GLUCOSE SERPL-MCNC: 79 MG/DL (ref 65–99)
GLUCOSE SERPL-MCNC: 80 MG/DL (ref 65–99)
GLUCOSE SERPL-MCNC: 81 MG/DL (ref 65–99)
GLUCOSE SERPL-MCNC: 82 MG/DL (ref 65–99)
GLUCOSE SERPL-MCNC: 86 MG/DL (ref 65–99)
GLUCOSE SERPL-MCNC: 87 MG/DL (ref 65–99)
GLUCOSE SERPL-MCNC: 88 MG/DL (ref 65–99)
GLUCOSE SERPL-MCNC: 89 MG/DL (ref 65–99)
GLUCOSE SERPL-MCNC: 89 MG/DL (ref 65–99)
GLUCOSE SERPL-MCNC: 91 MG/DL (ref 65–99)
GLUCOSE SERPL-MCNC: 95 MG/DL (ref 65–99)
GLUCOSE UR STRIP-MCNC: NEGATIVE MG/DL
HCO3 BLDV-SCNC: 24.7 MMOL/L (ref 22–26)
HCT VFR BLD AUTO: 23.2 % (ref 34–46.6)
HCT VFR BLD AUTO: 25.3 % (ref 34–46.6)
HCT VFR BLD AUTO: 25.4 % (ref 34–46.6)
HCT VFR BLD AUTO: 27.8 % (ref 34–46.6)
HCT VFR BLD AUTO: 27.9 % (ref 34–46.6)
HCT VFR BLD AUTO: 28.2 % (ref 34–46.6)
HCT VFR BLD AUTO: 29 % (ref 34–46.6)
HCT VFR BLD AUTO: 29.1 % (ref 34–46.6)
HCT VFR BLD AUTO: 29.5 % (ref 34–46.6)
HCT VFR BLD AUTO: 29.6 % (ref 34–46.6)
HCT VFR BLD AUTO: 29.7 % (ref 34–46.6)
HCT VFR BLD AUTO: 29.9 % (ref 34–46.6)
HCT VFR BLD AUTO: 30.1 % (ref 34–46.6)
HCT VFR BLD AUTO: 30.4 % (ref 34–46.6)
HCT VFR BLD AUTO: 30.4 % (ref 34–46.6)
HCT VFR BLD AUTO: 30.9 % (ref 34–46.6)
HCT VFR BLD AUTO: 31.9 % (ref 34–46.6)
HCT VFR BLD AUTO: 31.9 % (ref 34–46.6)
HCT VFR BLD AUTO: 33 % (ref 34–46.6)
HCT VFR BLD AUTO: 33.4 % (ref 34–46.6)
HCT VFR BLD AUTO: 33.6 % (ref 34–46.6)
HCT VFR BLD AUTO: 33.7 % (ref 34–46.6)
HCT VFR BLD AUTO: 33.7 % (ref 34–46.6)
HCT VFR BLD AUTO: 34 % (ref 34–46.6)
HCT VFR BLD AUTO: 34.4 % (ref 34–46.6)
HCT VFR BLD AUTO: 39.1 % (ref 34–46.6)
HDLC SERPL-MCNC: 73 MG/DL (ref 40–60)
HDLC SERPL-MCNC: 90 MG/DL (ref 40–60)
HEMOCCULT STL QL IA: POSITIVE
HGB BLD-MCNC: 10 G/DL (ref 12–15.9)
HGB BLD-MCNC: 10.1 G/DL (ref 12–15.9)
HGB BLD-MCNC: 10.2 G/DL (ref 12–15.9)
HGB BLD-MCNC: 10.2 G/DL (ref 12–15.9)
HGB BLD-MCNC: 10.3 G/DL (ref 12–15.9)
HGB BLD-MCNC: 10.5 G/DL (ref 12–15.9)
HGB BLD-MCNC: 12.5 G/DL (ref 12–15.9)
HGB BLD-MCNC: 7.1 G/DL (ref 12–15.9)
HGB BLD-MCNC: 7.5 G/DL (ref 12–15.9)
HGB BLD-MCNC: 7.7 G/DL (ref 12–15.9)
HGB BLD-MCNC: 8.5 G/DL (ref 12–15.9)
HGB BLD-MCNC: 8.6 G/DL (ref 12–15.9)
HGB BLD-MCNC: 8.6 G/DL (ref 12–15.9)
HGB BLD-MCNC: 8.9 G/DL (ref 12–15.9)
HGB BLD-MCNC: 9.1 G/DL (ref 12–15.9)
HGB BLD-MCNC: 9.2 G/DL (ref 12–15.9)
HGB BLD-MCNC: 9.3 G/DL (ref 12–15.9)
HGB BLD-MCNC: 9.4 G/DL (ref 12–15.9)
HGB BLD-MCNC: 9.7 G/DL (ref 12–15.9)
HGB BLD-MCNC: 9.7 G/DL (ref 12–15.9)
HGB BLD-MCNC: 9.9 G/DL (ref 12–15.9)
HGB BLDA-MCNC: 11.1 G/DL (ref 11.7–14.6)
HGB UR QL STRIP.AUTO: ABNORMAL
HGB UR QL STRIP.AUTO: NEGATIVE
HGB UR QL STRIP.AUTO: NEGATIVE
HOLD SPECIMEN: NORMAL
HYALINE CASTS UR QL AUTO: ABNORMAL /LPF
HYALINE CASTS UR QL AUTO: ABNORMAL /LPF
IMM GRANULOCYTES # BLD AUTO: 0.02 10*3/MM3 (ref 0–0.05)
IMM GRANULOCYTES # BLD AUTO: 0.02 10*3/MM3 (ref 0–0.05)
IMM GRANULOCYTES # BLD AUTO: 0.03 10*3/MM3 (ref 0–0.05)
IMM GRANULOCYTES # BLD AUTO: 0.06 10*3/MM3 (ref 0–0.05)
IMM GRANULOCYTES # BLD AUTO: 0.07 10*3/MM3 (ref 0–0.05)
IMM GRANULOCYTES # BLD AUTO: 0.07 10*3/MM3 (ref 0–0.05)
IMM GRANULOCYTES # BLD AUTO: 0.08 10*3/MM3 (ref 0–0.05)
IMM GRANULOCYTES # BLD AUTO: 0.09 10*3/MM3 (ref 0–0.05)
IMM GRANULOCYTES # BLD AUTO: 0.11 10*3/MM3 (ref 0–0.05)
IMM GRANULOCYTES # BLD AUTO: 0.13 10*3/MM3 (ref 0–0.05)
IMM GRANULOCYTES # BLD AUTO: 0.16 10*3/MM3 (ref 0–0.05)
IMM GRANULOCYTES # BLD AUTO: 0.19 10*3/MM3 (ref 0–0.05)
IMM GRANULOCYTES # BLD AUTO: 0.2 10*3/MM3 (ref 0–0.05)
IMM GRANULOCYTES # BLD AUTO: 0.2 10*3/MM3 (ref 0–0.05)
IMM GRANULOCYTES # BLD AUTO: 0.21 10*3/MM3 (ref 0–0.05)
IMM GRANULOCYTES # BLD AUTO: 0.23 10*3/MM3 (ref 0–0.05)
IMM GRANULOCYTES # BLD AUTO: 0.3 10*3/MM3 (ref 0–0.05)
IMM GRANULOCYTES # BLD AUTO: 0.31 10*3/MM3 (ref 0–0.05)
IMM GRANULOCYTES # BLD AUTO: 0.33 10*3/MM3 (ref 0–0.05)
IMM GRANULOCYTES NFR BLD AUTO: 0.4 % (ref 0–0.5)
IMM GRANULOCYTES NFR BLD AUTO: 0.4 % (ref 0–0.5)
IMM GRANULOCYTES NFR BLD AUTO: 0.5 % (ref 0–0.5)
IMM GRANULOCYTES NFR BLD AUTO: 0.8 % (ref 0–0.5)
IMM GRANULOCYTES NFR BLD AUTO: 0.9 % (ref 0–0.5)
IMM GRANULOCYTES NFR BLD AUTO: 1 % (ref 0–0.5)
IMM GRANULOCYTES NFR BLD AUTO: 1 % (ref 0–0.5)
IMM GRANULOCYTES NFR BLD AUTO: 1.1 % (ref 0–0.5)
IMM GRANULOCYTES NFR BLD AUTO: 1.1 % (ref 0–0.5)
IMM GRANULOCYTES NFR BLD AUTO: 1.3 % (ref 0–0.5)
IMM GRANULOCYTES NFR BLD AUTO: 1.6 % (ref 0–0.5)
IMM GRANULOCYTES NFR BLD AUTO: 1.8 % (ref 0–0.5)
IMM GRANULOCYTES NFR BLD AUTO: 1.9 % (ref 0–0.5)
IMM GRANULOCYTES NFR BLD AUTO: 2.1 % (ref 0–0.5)
IMM GRANULOCYTES NFR BLD AUTO: 2.2 % (ref 0–0.5)
IMM GRANULOCYTES NFR BLD AUTO: 2.2 % (ref 0–0.5)
IMM GRANULOCYTES NFR BLD AUTO: 2.9 % (ref 0–0.5)
IMM GRANULOCYTES NFR BLD AUTO: 3.3 % (ref 0–0.5)
IMM GRANULOCYTES NFR BLD AUTO: 3.7 % (ref 0–0.5)
INHALED O2 CONCENTRATION: ABNORMAL %
IRON 24H UR-MRATE: 36 MCG/DL (ref 37–145)
IRON 24H UR-MRATE: 40 MCG/DL (ref 37–145)
IRON SATN MFR SERPL: 18 % (ref 20–50)
IRON SATN MFR SERPL: 8 % (ref 20–50)
KETONES UR QL STRIP: ABNORMAL
LDLC SERPL CALC-MCNC: 112 MG/DL (ref 0–100)
LDLC SERPL CALC-MCNC: 67 MG/DL (ref 0–100)
LDLC/HDLC SERPL: 0.74 {RATIO}
LDLC/HDLC SERPL: 1.52 {RATIO}
LEFT ATRIUM VOLUME INDEX: 16.9 ML/M2
LEFT ATRIUM VOLUME: 28.8 ML
LEUKOCYTE ESTERASE UR QL STRIP.AUTO: ABNORMAL
LEUKOCYTE ESTERASE UR QL STRIP.AUTO: ABNORMAL
LEUKOCYTE ESTERASE UR QL STRIP.AUTO: NEGATIVE
LYMPHOCYTES # BLD AUTO: 0.32 10*3/MM3 (ref 0.7–3.1)
LYMPHOCYTES # BLD AUTO: 0.35 10*3/MM3 (ref 0.7–3.1)
LYMPHOCYTES # BLD AUTO: 0.46 10*3/MM3 (ref 0.7–3.1)
LYMPHOCYTES # BLD AUTO: 0.49 10*3/MM3 (ref 0.7–3.1)
LYMPHOCYTES # BLD AUTO: 0.5 10*3/MM3 (ref 0.7–3.1)
LYMPHOCYTES # BLD AUTO: 0.5 10*3/MM3 (ref 0.7–3.1)
LYMPHOCYTES # BLD AUTO: 0.56 10*3/MM3 (ref 0.7–3.1)
LYMPHOCYTES # BLD AUTO: 0.57 10*3/MM3 (ref 0.7–3.1)
LYMPHOCYTES # BLD AUTO: 0.57 10*3/MM3 (ref 0.7–3.1)
LYMPHOCYTES # BLD AUTO: 0.6 10*3/MM3 (ref 0.7–3.1)
LYMPHOCYTES # BLD AUTO: 0.61 10*3/MM3 (ref 0.7–3.1)
LYMPHOCYTES # BLD AUTO: 0.62 10*3/MM3 (ref 0.7–3.1)
LYMPHOCYTES # BLD AUTO: 0.8 10*3/MM3 (ref 0.7–3.1)
LYMPHOCYTES # BLD AUTO: 0.85 10*3/MM3 (ref 0.7–3.1)
LYMPHOCYTES # BLD AUTO: 0.85 10*3/MM3 (ref 0.7–3.1)
LYMPHOCYTES # BLD AUTO: 0.89 10*3/MM3 (ref 0.7–3.1)
LYMPHOCYTES NFR BLD AUTO: 10 % (ref 19.6–45.3)
LYMPHOCYTES NFR BLD AUTO: 10.9 % (ref 19.6–45.3)
LYMPHOCYTES NFR BLD AUTO: 14.5 % (ref 19.6–45.3)
LYMPHOCYTES NFR BLD AUTO: 14.9 % (ref 19.6–45.3)
LYMPHOCYTES NFR BLD AUTO: 2.7 % (ref 19.6–45.3)
LYMPHOCYTES NFR BLD AUTO: 3.8 % (ref 19.6–45.3)
LYMPHOCYTES NFR BLD AUTO: 3.9 % (ref 19.6–45.3)
LYMPHOCYTES NFR BLD AUTO: 3.9 % (ref 19.6–45.3)
LYMPHOCYTES NFR BLD AUTO: 4.1 % (ref 19.6–45.3)
LYMPHOCYTES NFR BLD AUTO: 4.5 % (ref 19.6–45.3)
LYMPHOCYTES NFR BLD AUTO: 5 % (ref 19.6–45.3)
LYMPHOCYTES NFR BLD AUTO: 5.3 % (ref 19.6–45.3)
LYMPHOCYTES NFR BLD AUTO: 5.4 % (ref 19.6–45.3)
LYMPHOCYTES NFR BLD AUTO: 5.8 % (ref 19.6–45.3)
LYMPHOCYTES NFR BLD AUTO: 6 % (ref 19.6–45.3)
LYMPHOCYTES NFR BLD AUTO: 6.4 % (ref 19.6–45.3)
LYMPHOCYTES NFR BLD AUTO: 7.3 % (ref 19.6–45.3)
LYMPHOCYTES NFR BLD AUTO: 7.8 % (ref 19.6–45.3)
LYMPHOCYTES NFR BLD AUTO: 9.5 % (ref 19.6–45.3)
MAGNESIUM SERPL-MCNC: 1.4 MG/DL (ref 1.6–2.4)
MAGNESIUM SERPL-MCNC: 1.4 MG/DL (ref 1.6–2.4)
MAGNESIUM SERPL-MCNC: 1.6 MG/DL (ref 1.6–2.4)
MAGNESIUM SERPL-MCNC: 1.7 MG/DL (ref 1.6–2.4)
MAGNESIUM SERPL-MCNC: 1.8 MG/DL (ref 1.6–2.4)
MAGNESIUM SERPL-MCNC: 1.9 MG/DL (ref 1.6–2.4)
MAGNESIUM SERPL-MCNC: 2 MG/DL (ref 1.6–2.4)
MAGNESIUM SERPL-MCNC: 2.1 MG/DL (ref 1.6–2.4)
MAGNESIUM SERPL-MCNC: 2.2 MG/DL (ref 1.6–2.4)
MAGNESIUM SERPL-MCNC: 2.4 MG/DL (ref 1.6–2.4)
MAGNESIUM SERPL-MCNC: 2.6 MG/DL (ref 1.6–2.4)
MAXIMAL PREDICTED HEART RATE: 130 BPM
MCH RBC QN AUTO: 25.4 PG (ref 26.6–33)
MCH RBC QN AUTO: 25.8 PG (ref 26.6–33)
MCH RBC QN AUTO: 26 PG (ref 26.6–33)
MCH RBC QN AUTO: 26.1 PG (ref 26.6–33)
MCH RBC QN AUTO: 26.3 PG (ref 26.6–33)
MCH RBC QN AUTO: 26.3 PG (ref 26.6–33)
MCH RBC QN AUTO: 26.4 PG (ref 26.6–33)
MCH RBC QN AUTO: 26.5 PG (ref 26.6–33)
MCH RBC QN AUTO: 26.6 PG (ref 26.6–33)
MCH RBC QN AUTO: 26.7 PG (ref 26.6–33)
MCH RBC QN AUTO: 27.2 PG (ref 26.6–33)
MCH RBC QN AUTO: 27.3 PG (ref 26.6–33)
MCH RBC QN AUTO: 27.3 PG (ref 26.6–33)
MCH RBC QN AUTO: 27.4 PG (ref 26.6–33)
MCH RBC QN AUTO: 27.4 PG (ref 26.6–33)
MCH RBC QN AUTO: 27.5 PG (ref 26.6–33)
MCH RBC QN AUTO: 27.7 PG (ref 26.6–33)
MCH RBC QN AUTO: 27.9 PG (ref 26.6–33)
MCH RBC QN AUTO: 28.8 PG (ref 26.6–33)
MCHC RBC AUTO-ENTMCNC: 29.4 G/DL (ref 31.5–35.7)
MCHC RBC AUTO-ENTMCNC: 29.5 G/DL (ref 31.5–35.7)
MCHC RBC AUTO-ENTMCNC: 29.9 G/DL (ref 31.5–35.7)
MCHC RBC AUTO-ENTMCNC: 29.9 G/DL (ref 31.5–35.7)
MCHC RBC AUTO-ENTMCNC: 30 G/DL (ref 31.5–35.7)
MCHC RBC AUTO-ENTMCNC: 30.1 G/DL (ref 31.5–35.7)
MCHC RBC AUTO-ENTMCNC: 30.1 G/DL (ref 31.5–35.7)
MCHC RBC AUTO-ENTMCNC: 30.2 G/DL (ref 31.5–35.7)
MCHC RBC AUTO-ENTMCNC: 30.3 G/DL (ref 31.5–35.7)
MCHC RBC AUTO-ENTMCNC: 30.4 G/DL (ref 31.5–35.7)
MCHC RBC AUTO-ENTMCNC: 30.5 G/DL (ref 31.5–35.7)
MCHC RBC AUTO-ENTMCNC: 30.6 G/DL (ref 31.5–35.7)
MCHC RBC AUTO-ENTMCNC: 30.8 G/DL (ref 31.5–35.7)
MCHC RBC AUTO-ENTMCNC: 30.9 G/DL (ref 31.5–35.7)
MCHC RBC AUTO-ENTMCNC: 30.9 G/DL (ref 31.5–35.7)
MCHC RBC AUTO-ENTMCNC: 31.4 G/DL (ref 31.5–35.7)
MCHC RBC AUTO-ENTMCNC: 32 G/DL (ref 31.5–35.7)
MCV RBC AUTO: 83.3 FL (ref 79–97)
MCV RBC AUTO: 85.8 FL (ref 79–97)
MCV RBC AUTO: 86 FL (ref 79–97)
MCV RBC AUTO: 86 FL (ref 79–97)
MCV RBC AUTO: 86.1 FL (ref 79–97)
MCV RBC AUTO: 86.7 FL (ref 79–97)
MCV RBC AUTO: 86.8 FL (ref 79–97)
MCV RBC AUTO: 86.9 FL (ref 79–97)
MCV RBC AUTO: 86.9 FL (ref 79–97)
MCV RBC AUTO: 87.1 FL (ref 79–97)
MCV RBC AUTO: 87.8 FL (ref 79–97)
MCV RBC AUTO: 87.9 FL (ref 79–97)
MCV RBC AUTO: 88 FL (ref 79–97)
MCV RBC AUTO: 88.4 FL (ref 79–97)
MCV RBC AUTO: 88.8 FL (ref 79–97)
MCV RBC AUTO: 88.9 FL (ref 79–97)
MCV RBC AUTO: 89 FL (ref 79–97)
MCV RBC AUTO: 89.9 FL (ref 79–97)
MCV RBC AUTO: 90.1 FL (ref 79–97)
MCV RBC AUTO: 90.6 FL (ref 79–97)
MCV RBC AUTO: 90.7 FL (ref 79–97)
MCV RBC AUTO: 91.4 FL (ref 79–97)
MCV RBC AUTO: 92 FL (ref 79–97)
MCV RBC AUTO: 92.2 FL (ref 79–97)
METHGB BLD QL: 0.3 % (ref 0–1.5)
MODALITY: ABNORMAL
MONOCYTES # BLD AUTO: 0.42 10*3/MM3 (ref 0.1–0.9)
MONOCYTES # BLD AUTO: 0.44 10*3/MM3 (ref 0.1–0.9)
MONOCYTES # BLD AUTO: 0.53 10*3/MM3 (ref 0.1–0.9)
MONOCYTES # BLD AUTO: 0.55 10*3/MM3 (ref 0.1–0.9)
MONOCYTES # BLD AUTO: 0.6 10*3/MM3 (ref 0.1–0.9)
MONOCYTES # BLD AUTO: 0.63 10*3/MM3 (ref 0.1–0.9)
MONOCYTES # BLD AUTO: 0.67 10*3/MM3 (ref 0.1–0.9)
MONOCYTES # BLD AUTO: 0.68 10*3/MM3 (ref 0.1–0.9)
MONOCYTES # BLD AUTO: 0.7 10*3/MM3 (ref 0.1–0.9)
MONOCYTES # BLD AUTO: 0.74 10*3/MM3 (ref 0.1–0.9)
MONOCYTES # BLD AUTO: 0.74 10*3/MM3 (ref 0.1–0.9)
MONOCYTES # BLD AUTO: 0.83 10*3/MM3 (ref 0.1–0.9)
MONOCYTES # BLD AUTO: 0.84 10*3/MM3 (ref 0.1–0.9)
MONOCYTES # BLD AUTO: 0.93 10*3/MM3 (ref 0.1–0.9)
MONOCYTES # BLD AUTO: 0.93 10*3/MM3 (ref 0.1–0.9)
MONOCYTES # BLD AUTO: 0.96 10*3/MM3 (ref 0.1–0.9)
MONOCYTES # BLD AUTO: 1.05 10*3/MM3 (ref 0.1–0.9)
MONOCYTES # BLD AUTO: 1.09 10*3/MM3 (ref 0.1–0.9)
MONOCYTES # BLD AUTO: 1.14 10*3/MM3 (ref 0.1–0.9)
MONOCYTES NFR BLD AUTO: 10.4 % (ref 5–12)
MONOCYTES NFR BLD AUTO: 11.2 % (ref 5–12)
MONOCYTES NFR BLD AUTO: 11.7 % (ref 5–12)
MONOCYTES NFR BLD AUTO: 7.2 % (ref 5–12)
MONOCYTES NFR BLD AUTO: 7.3 % (ref 5–12)
MONOCYTES NFR BLD AUTO: 7.4 % (ref 5–12)
MONOCYTES NFR BLD AUTO: 7.4 % (ref 5–12)
MONOCYTES NFR BLD AUTO: 7.6 % (ref 5–12)
MONOCYTES NFR BLD AUTO: 7.6 % (ref 5–12)
MONOCYTES NFR BLD AUTO: 7.7 % (ref 5–12)
MONOCYTES NFR BLD AUTO: 7.9 % (ref 5–12)
MONOCYTES NFR BLD AUTO: 8.1 % (ref 5–12)
MONOCYTES NFR BLD AUTO: 8.3 % (ref 5–12)
MONOCYTES NFR BLD AUTO: 8.8 % (ref 5–12)
MONOCYTES NFR BLD AUTO: 8.9 % (ref 5–12)
MONOCYTES NFR BLD AUTO: 9 % (ref 5–12)
MONOCYTES NFR BLD AUTO: 9.1 % (ref 5–12)
MONOCYTES NFR BLD AUTO: 9.2 % (ref 5–12)
MONOCYTES NFR BLD AUTO: 9.9 % (ref 5–12)
NEUTROPHILS NFR BLD AUTO: 10.26 10*3/MM3 (ref 1.7–7)
NEUTROPHILS NFR BLD AUTO: 12.99 10*3/MM3 (ref 1.7–7)
NEUTROPHILS NFR BLD AUTO: 3.88 10*3/MM3 (ref 1.7–7)
NEUTROPHILS NFR BLD AUTO: 4.57 10*3/MM3 (ref 1.7–7)
NEUTROPHILS NFR BLD AUTO: 4.74 10*3/MM3 (ref 1.7–7)
NEUTROPHILS NFR BLD AUTO: 5.12 10*3/MM3 (ref 1.7–7)
NEUTROPHILS NFR BLD AUTO: 6.04 10*3/MM3 (ref 1.7–7)
NEUTROPHILS NFR BLD AUTO: 6.48 10*3/MM3 (ref 1.7–7)
NEUTROPHILS NFR BLD AUTO: 6.73 10*3/MM3 (ref 1.7–7)
NEUTROPHILS NFR BLD AUTO: 6.82 10*3/MM3 (ref 1.7–7)
NEUTROPHILS NFR BLD AUTO: 6.88 10*3/MM3 (ref 1.7–7)
NEUTROPHILS NFR BLD AUTO: 7.3 10*3/MM3 (ref 1.7–7)
NEUTROPHILS NFR BLD AUTO: 7.32 10*3/MM3 (ref 1.7–7)
NEUTROPHILS NFR BLD AUTO: 7.54 10*3/MM3 (ref 1.7–7)
NEUTROPHILS NFR BLD AUTO: 7.74 10*3/MM3 (ref 1.7–7)
NEUTROPHILS NFR BLD AUTO: 7.91 10*3/MM3 (ref 1.7–7)
NEUTROPHILS NFR BLD AUTO: 72.2 % (ref 42.7–76)
NEUTROPHILS NFR BLD AUTO: 75 % (ref 42.7–76)
NEUTROPHILS NFR BLD AUTO: 77.1 % (ref 42.7–76)
NEUTROPHILS NFR BLD AUTO: 78.9 % (ref 42.7–76)
NEUTROPHILS NFR BLD AUTO: 8.55 10*3/MM3 (ref 1.7–7)
NEUTROPHILS NFR BLD AUTO: 8.59 10*3/MM3 (ref 1.7–7)
NEUTROPHILS NFR BLD AUTO: 8.8 10*3/MM3 (ref 1.7–7)
NEUTROPHILS NFR BLD AUTO: 80.4 % (ref 42.7–76)
NEUTROPHILS NFR BLD AUTO: 80.6 % (ref 42.7–76)
NEUTROPHILS NFR BLD AUTO: 80.7 % (ref 42.7–76)
NEUTROPHILS NFR BLD AUTO: 81.5 % (ref 42.7–76)
NEUTROPHILS NFR BLD AUTO: 82.3 % (ref 42.7–76)
NEUTROPHILS NFR BLD AUTO: 82.9 % (ref 42.7–76)
NEUTROPHILS NFR BLD AUTO: 83 % (ref 42.7–76)
NEUTROPHILS NFR BLD AUTO: 83.3 % (ref 42.7–76)
NEUTROPHILS NFR BLD AUTO: 83.6 % (ref 42.7–76)
NEUTROPHILS NFR BLD AUTO: 84.2 % (ref 42.7–76)
NEUTROPHILS NFR BLD AUTO: 85.8 % (ref 42.7–76)
NEUTROPHILS NFR BLD AUTO: 86.2 % (ref 42.7–76)
NEUTROPHILS NFR BLD AUTO: 86.3 % (ref 42.7–76)
NEUTROPHILS NFR BLD AUTO: 87 % (ref 42.7–76)
NEUTROPHILS NFR BLD AUTO: 87.3 % (ref 42.7–76)
NITRITE UR QL STRIP: NEGATIVE
NITRITE UR QL STRIP: NEGATIVE
NITRITE UR QL STRIP: POSITIVE
NOTE: ABNORMAL
NRBC BLD AUTO-RTO: 0 /100 WBC (ref 0–0.2)
NRBC BLD AUTO-RTO: 0.2 /100 WBC (ref 0–0.2)
NT-PROBNP SERPL-MCNC: 113.2 PG/ML (ref 0–1800)
NT-PROBNP SERPL-MCNC: 189 PG/ML (ref 0–1800)
NT-PROBNP SERPL-MCNC: 295.9 PG/ML (ref 0–1800)
NT-PROBNP SERPL-MCNC: 3412 PG/ML (ref 0–1800)
NT-PROBNP SERPL-MCNC: 724.1 PG/ML (ref 0–1800)
NT-PROBNP SERPL-MCNC: ABNORMAL PG/ML (ref 0–1800)
OXYHGB MFR BLDV: 87.2 % (ref 94–99)
PCO2 BLDV: 43.2 MM HG (ref 41–51)
PH BLDV: 7.38 PH UNITS (ref 7.31–7.41)
PH UR STRIP.AUTO: 6.5 [PH] (ref 5–8)
PH UR STRIP.AUTO: <=5 [PH] (ref 5–8)
PH UR STRIP.AUTO: <=5 [PH] (ref 5–8)
PHOSPHATE SERPL-MCNC: 1.8 MG/DL (ref 2.5–4.5)
PHOSPHATE SERPL-MCNC: 1.9 MG/DL (ref 2.5–4.5)
PHOSPHATE SERPL-MCNC: 2 MG/DL (ref 2.5–4.5)
PHOSPHATE SERPL-MCNC: 2 MG/DL (ref 2.5–4.5)
PHOSPHATE SERPL-MCNC: 2.5 MG/DL (ref 2.5–4.5)
PHOSPHATE SERPL-MCNC: 2.5 MG/DL (ref 2.5–4.5)
PHOSPHATE SERPL-MCNC: 2.9 MG/DL (ref 2.5–4.5)
PHOSPHATE SERPL-MCNC: 3.1 MG/DL (ref 2.5–4.5)
PHOSPHATE SERPL-MCNC: 3.2 MG/DL (ref 2.5–4.5)
PHOSPHATE SERPL-MCNC: 3.3 MG/DL (ref 2.5–4.5)
PHOSPHATE SERPL-MCNC: 3.8 MG/DL (ref 2.5–4.5)
PLATELET # BLD AUTO: 136 10*3/MM3 (ref 140–450)
PLATELET # BLD AUTO: 137 10*3/MM3 (ref 140–450)
PLATELET # BLD AUTO: 142 10*3/MM3 (ref 140–450)
PLATELET # BLD AUTO: 149 10*3/MM3 (ref 140–450)
PLATELET # BLD AUTO: 150 10*3/MM3 (ref 140–450)
PLATELET # BLD AUTO: 154 10*3/MM3 (ref 140–450)
PLATELET # BLD AUTO: 155 10*3/MM3 (ref 140–450)
PLATELET # BLD AUTO: 159 10*3/MM3 (ref 140–450)
PLATELET # BLD AUTO: 170 10*3/MM3 (ref 140–450)
PLATELET # BLD AUTO: 171 10*3/MM3 (ref 140–450)
PLATELET # BLD AUTO: 176 10*3/MM3 (ref 140–450)
PLATELET # BLD AUTO: 177 10*3/MM3 (ref 140–450)
PLATELET # BLD AUTO: 180 10*3/MM3 (ref 140–450)
PLATELET # BLD AUTO: 181 10*3/MM3 (ref 140–450)
PLATELET # BLD AUTO: 193 10*3/MM3 (ref 140–450)
PLATELET # BLD AUTO: 205 10*3/MM3 (ref 140–450)
PLATELET # BLD AUTO: 206 10*3/MM3 (ref 140–450)
PLATELET # BLD AUTO: 207 10*3/MM3 (ref 140–450)
PLATELET # BLD AUTO: 207 10*3/MM3 (ref 140–450)
PLATELET # BLD AUTO: 215 10*3/MM3 (ref 140–450)
PLATELET # BLD AUTO: 226 10*3/MM3 (ref 140–450)
PLATELET # BLD AUTO: 235 10*3/MM3 (ref 140–450)
PLATELET # BLD AUTO: 258 10*3/MM3 (ref 140–450)
PLATELET # BLD AUTO: 285 10*3/MM3 (ref 140–450)
PMV BLD AUTO: 10 FL (ref 6–12)
PMV BLD AUTO: 8.9 FL (ref 6–12)
PMV BLD AUTO: 8.9 FL (ref 6–12)
PMV BLD AUTO: 9 FL (ref 6–12)
PMV BLD AUTO: 9 FL (ref 6–12)
PMV BLD AUTO: 9.1 FL (ref 6–12)
PMV BLD AUTO: 9.2 FL (ref 6–12)
PMV BLD AUTO: 9.3 FL (ref 6–12)
PMV BLD AUTO: 9.4 FL (ref 6–12)
PMV BLD AUTO: 9.5 FL (ref 6–12)
PMV BLD AUTO: 9.6 FL (ref 6–12)
PMV BLD AUTO: 9.7 FL (ref 6–12)
PMV BLD AUTO: 9.8 FL (ref 6–12)
PMV BLD AUTO: 9.9 FL (ref 6–12)
PMV BLD AUTO: 9.9 FL (ref 6–12)
PO2 BLDV: 68.4 MM HG (ref 35–42)
POTASSIUM SERPL-SCNC: 3.3 MMOL/L (ref 3.5–5.2)
POTASSIUM SERPL-SCNC: 3.4 MMOL/L (ref 3.5–5.2)
POTASSIUM SERPL-SCNC: 3.5 MMOL/L (ref 3.5–5.2)
POTASSIUM SERPL-SCNC: 3.5 MMOL/L (ref 3.5–5.2)
POTASSIUM SERPL-SCNC: 3.6 MMOL/L (ref 3.5–5.2)
POTASSIUM SERPL-SCNC: 3.6 MMOL/L (ref 3.5–5.2)
POTASSIUM SERPL-SCNC: 3.7 MMOL/L (ref 3.5–5.2)
POTASSIUM SERPL-SCNC: 3.8 MMOL/L (ref 3.5–5.2)
POTASSIUM SERPL-SCNC: 3.9 MMOL/L (ref 3.5–5.2)
POTASSIUM SERPL-SCNC: 4 MMOL/L (ref 3.5–5.2)
POTASSIUM SERPL-SCNC: 4 MMOL/L (ref 3.5–5.2)
POTASSIUM SERPL-SCNC: 4.2 MMOL/L (ref 3.5–5.2)
POTASSIUM SERPL-SCNC: 4.2 MMOL/L (ref 3.5–5.2)
POTASSIUM SERPL-SCNC: 4.3 MMOL/L (ref 3.5–5.2)
POTASSIUM SERPL-SCNC: 4.5 MMOL/L (ref 3.5–5.2)
POTASSIUM SERPL-SCNC: 4.6 MMOL/L (ref 3.5–5.2)
POTASSIUM SERPL-SCNC: 4.7 MMOL/L (ref 3.5–5.2)
POTASSIUM SERPL-SCNC: 4.8 MMOL/L (ref 3.5–5.2)
POTASSIUM SERPL-SCNC: 4.9 MMOL/L (ref 3.5–5.2)
POTASSIUM SERPL-SCNC: 4.9 MMOL/L (ref 3.5–5.2)
POTASSIUM SERPL-SCNC: 5.2 MMOL/L (ref 3.5–5.2)
POTASSIUM SERPL-SCNC: 5.3 MMOL/L (ref 3.5–5.2)
PROCALCITONIN SERPL-MCNC: 0.23 NG/ML (ref 0–0.25)
PROT SERPL-MCNC: 4.7 G/DL (ref 6–8.5)
PROT SERPL-MCNC: 5.1 G/DL (ref 6–8.5)
PROT SERPL-MCNC: 5.1 G/DL (ref 6–8.5)
PROT SERPL-MCNC: 5.2 G/DL (ref 6–8.5)
PROT SERPL-MCNC: 5.2 G/DL (ref 6–8.5)
PROT SERPL-MCNC: 5.3 G/DL (ref 6–8.5)
PROT SERPL-MCNC: 5.4 G/DL (ref 6–8.5)
PROT SERPL-MCNC: 5.4 G/DL (ref 6–8.5)
PROT SERPL-MCNC: 5.5 G/DL (ref 6–8.5)
PROT SERPL-MCNC: 5.8 G/DL (ref 6–8.5)
PROT SERPL-MCNC: 6 G/DL (ref 6–8.5)
PROT SERPL-MCNC: 6.6 G/DL (ref 6–8.5)
PROT SERPL-MCNC: 7 G/DL (ref 6–8.5)
PROT SERPL-MCNC: 7.1 G/DL (ref 6–8.5)
PROT SERPL-MCNC: 7.1 G/DL (ref 6–8.5)
PROT SERPL-MCNC: 7.5 G/DL (ref 6–8.5)
PROT SERPL-MCNC: 7.8 G/DL (ref 6–8.5)
PROT UR QL STRIP: ABNORMAL
PROT UR QL STRIP: ABNORMAL
PROT UR QL STRIP: NEGATIVE
QT INTERVAL: 372 MS
QT INTERVAL: 384 MS
QT INTERVAL: 398 MS
QT INTERVAL: 414 MS
QT INTERVAL: 516 MS
RBC # BLD AUTO: 2.67 10*6/MM3 (ref 3.77–5.28)
RBC # BLD AUTO: 2.76 10*6/MM3 (ref 3.77–5.28)
RBC # BLD AUTO: 2.91 10*6/MM3 (ref 3.77–5.28)
RBC # BLD AUTO: 3.11 10*6/MM3 (ref 3.77–5.28)
RBC # BLD AUTO: 3.14 10*6/MM3 (ref 3.77–5.28)
RBC # BLD AUTO: 3.24 10*6/MM3 (ref 3.77–5.28)
RBC # BLD AUTO: 3.24 10*6/MM3 (ref 3.77–5.28)
RBC # BLD AUTO: 3.26 10*6/MM3 (ref 3.77–5.28)
RBC # BLD AUTO: 3.34 10*6/MM3 (ref 3.77–5.28)
RBC # BLD AUTO: 3.36 10*6/MM3 (ref 3.77–5.28)
RBC # BLD AUTO: 3.42 10*6/MM3 (ref 3.77–5.28)
RBC # BLD AUTO: 3.46 10*6/MM3 (ref 3.77–5.28)
RBC # BLD AUTO: 3.48 10*6/MM3 (ref 3.77–5.28)
RBC # BLD AUTO: 3.5 10*6/MM3 (ref 3.77–5.28)
RBC # BLD AUTO: 3.53 10*6/MM3 (ref 3.77–5.28)
RBC # BLD AUTO: 3.55 10*6/MM3 (ref 3.77–5.28)
RBC # BLD AUTO: 3.58 10*6/MM3 (ref 3.77–5.28)
RBC # BLD AUTO: 3.68 10*6/MM3 (ref 3.77–5.28)
RBC # BLD AUTO: 3.72 10*6/MM3 (ref 3.77–5.28)
RBC # BLD AUTO: 3.78 10*6/MM3 (ref 3.77–5.28)
RBC # BLD AUTO: 3.82 10*6/MM3 (ref 3.77–5.28)
RBC # BLD AUTO: 3.92 10*6/MM3 (ref 3.77–5.28)
RBC # BLD AUTO: 4.13 10*6/MM3 (ref 3.77–5.28)
RBC # BLD AUTO: 4.34 10*6/MM3 (ref 3.77–5.28)
RBC # UR STRIP: ABNORMAL /HPF
RBC # UR STRIP: ABNORMAL /HPF
REF LAB TEST METHOD: ABNORMAL
REF LAB TEST METHOD: ABNORMAL
RH BLD: POSITIVE
SAO2 % BLDCOV: 89.8 % (ref 45–75)
SARS-COV-2 RNA PNL SPEC NAA+PROBE: NOT DETECTED
SODIUM SERPL-SCNC: 134 MMOL/L (ref 136–145)
SODIUM SERPL-SCNC: 135 MMOL/L (ref 136–145)
SODIUM SERPL-SCNC: 136 MMOL/L (ref 136–145)
SODIUM SERPL-SCNC: 138 MMOL/L (ref 136–145)
SODIUM SERPL-SCNC: 138 MMOL/L (ref 136–145)
SODIUM SERPL-SCNC: 139 MMOL/L (ref 136–145)
SODIUM SERPL-SCNC: 140 MMOL/L (ref 136–145)
SODIUM SERPL-SCNC: 141 MMOL/L (ref 136–145)
SODIUM SERPL-SCNC: 142 MMOL/L (ref 136–145)
SODIUM SERPL-SCNC: 144 MMOL/L (ref 136–145)
SODIUM SERPL-SCNC: 145 MMOL/L (ref 136–145)
SODIUM SERPL-SCNC: 145 MMOL/L (ref 136–145)
SODIUM SERPL-SCNC: 146 MMOL/L (ref 136–145)
SODIUM SERPL-SCNC: 147 MMOL/L (ref 136–145)
SODIUM SERPL-SCNC: 148 MMOL/L (ref 136–145)
SP GR UR STRIP: 1.02 (ref 1–1.03)
SQUAMOUS #/AREA URNS HPF: ABNORMAL /HPF
SQUAMOUS #/AREA URNS HPF: ABNORMAL /HPF
STRESS TARGET HR: 111 BPM
T&S EXPIRATION DATE: NORMAL
T&S EXPIRATION DATE: NORMAL
TIBC SERPL-MCNC: 221 MCG/DL (ref 298–536)
TIBC SERPL-MCNC: 460 MCG/DL (ref 298–536)
TRANSFERRIN SERPL-MCNC: 148 MG/DL (ref 200–360)
TRANSFERRIN SERPL-MCNC: 309 MG/DL (ref 200–360)
TRIGL SERPL-MCNC: 57 MG/DL (ref 0–150)
TRIGL SERPL-MCNC: 71 MG/DL (ref 0–150)
TROPONIN T SERPL-MCNC: 0.01 NG/ML (ref 0–0.03)
TROPONIN T SERPL-MCNC: 0.03 NG/ML (ref 0–0.03)
TROPONIN T SERPL-MCNC: 0.04 NG/ML (ref 0–0.03)
TROPONIN T SERPL-MCNC: <0.01 NG/ML (ref 0–0.03)
TSH SERPL DL<=0.05 MIU/L-ACNC: 1.24 UIU/ML (ref 0.27–4.2)
TSH SERPL DL<=0.05 MIU/L-ACNC: 3.75 UIU/ML (ref 0.27–4.2)
UNIT  ABO: NORMAL
UNIT  ABO: NORMAL
UNIT  RH: NORMAL
UNIT  RH: NORMAL
UROBILINOGEN UR QL STRIP: ABNORMAL
VANCOMYCIN SERPL-MCNC: 16.51 MCG/ML (ref 5–40)
VANCOMYCIN SERPL-MCNC: 20.17 MCG/ML (ref 5–40)
VANCOMYCIN SERPL-MCNC: 20.45 MCG/ML (ref 5–40)
VANCOMYCIN SERPL-MCNC: 9.4 MCG/ML (ref 5–40)
VIT B12 BLD-MCNC: 267 PG/ML (ref 211–946)
VIT B12 BLD-MCNC: >2000 PG/ML (ref 211–946)
VLDLC SERPL-MCNC: 11 MG/DL (ref 5–40)
VLDLC SERPL-MCNC: 13 MG/DL (ref 5–40)
WBC # UR STRIP: ABNORMAL /HPF
WBC # UR STRIP: ABNORMAL /HPF
WBC NRBC COR # BLD: 10.28 10*3/MM3 (ref 3.4–10.8)
WBC NRBC COR # BLD: 10.56 10*3/MM3 (ref 3.4–10.8)
WBC NRBC COR # BLD: 10.6 10*3/MM3 (ref 3.4–10.8)
WBC NRBC COR # BLD: 11.94 10*3/MM3 (ref 3.4–10.8)
WBC NRBC COR # BLD: 14.94 10*3/MM3 (ref 3.4–10.8)
WBC NRBC COR # BLD: 4.1 10*3/MM3 (ref 3.4–10.8)
WBC NRBC COR # BLD: 5.32 10*3/MM3 (ref 3.4–10.8)
WBC NRBC COR # BLD: 5.37 10*3/MM3 (ref 3.4–10.8)
WBC NRBC COR # BLD: 5.48 10*3/MM3 (ref 3.4–10.8)
WBC NRBC COR # BLD: 5.68 10*3/MM3 (ref 3.4–10.8)
WBC NRBC COR # BLD: 5.98 10*3/MM3 (ref 3.4–10.8)
WBC NRBC COR # BLD: 6.14 10*3/MM3 (ref 3.4–10.8)
WBC NRBC COR # BLD: 6.28 10*3/MM3 (ref 3.4–10.8)
WBC NRBC COR # BLD: 6.55 10*3/MM3 (ref 3.4–10.8)
WBC NRBC COR # BLD: 7 10*3/MM3 (ref 3.4–10.8)
WBC NRBC COR # BLD: 8.21 10*3/MM3 (ref 3.4–10.8)
WBC NRBC COR # BLD: 8.3 10*3/MM3 (ref 3.4–10.8)
WBC NRBC COR # BLD: 8.47 10*3/MM3 (ref 3.4–10.8)
WBC NRBC COR # BLD: 8.69 10*3/MM3 (ref 3.4–10.8)
WBC NRBC COR # BLD: 8.87 10*3/MM3 (ref 3.4–10.8)
WBC NRBC COR # BLD: 8.88 10*3/MM3 (ref 3.4–10.8)
WBC NRBC COR # BLD: 8.96 10*3/MM3 (ref 3.4–10.8)
WBC NRBC COR # BLD: 9.09 10*3/MM3 (ref 3.4–10.8)
WBC NRBC COR # BLD: 9.18 10*3/MM3 (ref 3.4–10.8)
WHOLE BLOOD HOLD COAG: NORMAL
WHOLE BLOOD HOLD SPECIMEN: NORMAL

## 2022-01-01 PROCEDURE — 83880 ASSAY OF NATRIURETIC PEPTIDE: CPT

## 2022-01-01 PROCEDURE — 86923 COMPATIBILITY TEST ELECTRIC: CPT

## 2022-01-01 PROCEDURE — 25010000002 MORPHINE PER 10 MG: Performed by: INTERNAL MEDICINE

## 2022-01-01 PROCEDURE — 99233 SBSQ HOSP IP/OBS HIGH 50: CPT | Performed by: INTERNAL MEDICINE

## 2022-01-01 PROCEDURE — 70450 CT HEAD/BRAIN W/O DYE: CPT

## 2022-01-01 PROCEDURE — 84484 ASSAY OF TROPONIN QUANT: CPT | Performed by: INTERNAL MEDICINE

## 2022-01-01 PROCEDURE — 25010000002 MEROPENEM PER 100 MG: Performed by: FAMILY MEDICINE

## 2022-01-01 PROCEDURE — 85025 COMPLETE CBC W/AUTO DIFF WBC: CPT | Performed by: FAMILY MEDICINE

## 2022-01-01 PROCEDURE — 94799 UNLISTED PULMONARY SVC/PX: CPT

## 2022-01-01 PROCEDURE — 36415 COLL VENOUS BLD VENIPUNCTURE: CPT

## 2022-01-01 PROCEDURE — 80076 HEPATIC FUNCTION PANEL: CPT | Performed by: FAMILY MEDICINE

## 2022-01-01 PROCEDURE — 94726 PLETHYSMOGRAPHY LUNG VOLUMES: CPT | Performed by: INTERNAL MEDICINE

## 2022-01-01 PROCEDURE — 99223 1ST HOSP IP/OBS HIGH 75: CPT | Performed by: INTERNAL MEDICINE

## 2022-01-01 PROCEDURE — 86900 BLOOD TYPING SEROLOGIC ABO: CPT

## 2022-01-01 PROCEDURE — 80053 COMPREHEN METABOLIC PANEL: CPT | Performed by: INTERNAL MEDICINE

## 2022-01-01 PROCEDURE — 99232 SBSQ HOSP IP/OBS MODERATE 35: CPT | Performed by: INTERNAL MEDICINE

## 2022-01-01 PROCEDURE — 99233 SBSQ HOSP IP/OBS HIGH 50: CPT | Performed by: FAMILY MEDICINE

## 2022-01-01 PROCEDURE — 83880 ASSAY OF NATRIURETIC PEPTIDE: CPT | Performed by: EMERGENCY MEDICINE

## 2022-01-01 PROCEDURE — 94664 DEMO&/EVAL PT USE INHALER: CPT

## 2022-01-01 PROCEDURE — 93000 ELECTROCARDIOGRAM COMPLETE: CPT | Performed by: INTERNAL MEDICINE

## 2022-01-01 PROCEDURE — 80053 COMPREHEN METABOLIC PANEL: CPT

## 2022-01-01 PROCEDURE — 74176 CT ABD & PELVIS W/O CONTRAST: CPT

## 2022-01-01 PROCEDURE — 80048 BASIC METABOLIC PNL TOTAL CA: CPT | Performed by: INTERNAL MEDICINE

## 2022-01-01 PROCEDURE — 99231 SBSQ HOSP IP/OBS SF/LOW 25: CPT | Performed by: INTERNAL MEDICINE

## 2022-01-01 PROCEDURE — 94761 N-INVAS EAR/PLS OXIMETRY MLT: CPT

## 2022-01-01 PROCEDURE — 82746 ASSAY OF FOLIC ACID SERUM: CPT | Performed by: PHYSICIAN ASSISTANT

## 2022-01-01 PROCEDURE — 85018 HEMOGLOBIN: CPT | Performed by: PHYSICIAN ASSISTANT

## 2022-01-01 PROCEDURE — 83735 ASSAY OF MAGNESIUM: CPT | Performed by: INTERNAL MEDICINE

## 2022-01-01 PROCEDURE — 82306 VITAMIN D 25 HYDROXY: CPT

## 2022-01-01 PROCEDURE — 85014 HEMATOCRIT: CPT | Performed by: FAMILY MEDICINE

## 2022-01-01 PROCEDURE — 80061 LIPID PANEL: CPT

## 2022-01-01 PROCEDURE — 95819 EEG AWAKE AND ASLEEP: CPT

## 2022-01-01 PROCEDURE — 81003 URINALYSIS AUTO W/O SCOPE: CPT | Performed by: EMERGENCY MEDICINE

## 2022-01-01 PROCEDURE — 93005 ELECTROCARDIOGRAM TRACING: CPT

## 2022-01-01 PROCEDURE — 86901 BLOOD TYPING SEROLOGIC RH(D): CPT | Performed by: FAMILY MEDICINE

## 2022-01-01 PROCEDURE — 92526 ORAL FUNCTION THERAPY: CPT

## 2022-01-01 PROCEDURE — 84443 ASSAY THYROID STIM HORMONE: CPT | Performed by: INTERNAL MEDICINE

## 2022-01-01 PROCEDURE — 93010 ELECTROCARDIOGRAM REPORT: CPT | Performed by: SPECIALIST

## 2022-01-01 PROCEDURE — 93005 ELECTROCARDIOGRAM TRACING: CPT | Performed by: STUDENT IN AN ORGANIZED HEALTH CARE EDUCATION/TRAINING PROGRAM

## 2022-01-01 PROCEDURE — G0378 HOSPITAL OBSERVATION PER HR: HCPCS

## 2022-01-01 PROCEDURE — 84100 ASSAY OF PHOSPHORUS: CPT | Performed by: INTERNAL MEDICINE

## 2022-01-01 PROCEDURE — 82805 BLOOD GASES W/O2 SATURATION: CPT | Performed by: INTERNAL MEDICINE

## 2022-01-01 PROCEDURE — 99221 1ST HOSP IP/OBS SF/LOW 40: CPT | Performed by: INTERNAL MEDICINE

## 2022-01-01 PROCEDURE — 83735 ASSAY OF MAGNESIUM: CPT | Performed by: FAMILY MEDICINE

## 2022-01-01 PROCEDURE — 97165 OT EVAL LOW COMPLEX 30 MIN: CPT

## 2022-01-01 PROCEDURE — U0005 INFEC AGEN DETEC AMPLI PROBE: HCPCS | Performed by: INTERNAL MEDICINE

## 2022-01-01 PROCEDURE — 80048 BASIC METABOLIC PNL TOTAL CA: CPT | Performed by: FAMILY MEDICINE

## 2022-01-01 PROCEDURE — 82746 ASSAY OF FOLIC ACID SERUM: CPT | Performed by: INTERNAL MEDICINE

## 2022-01-01 PROCEDURE — 85027 COMPLETE CBC AUTOMATED: CPT | Performed by: INTERNAL MEDICINE

## 2022-01-01 PROCEDURE — 94060 EVALUATION OF WHEEZING: CPT | Performed by: INTERNAL MEDICINE

## 2022-01-01 PROCEDURE — 80202 ASSAY OF VANCOMYCIN: CPT | Performed by: FAMILY MEDICINE

## 2022-01-01 PROCEDURE — 84100 ASSAY OF PHOSPHORUS: CPT | Performed by: FAMILY MEDICINE

## 2022-01-01 PROCEDURE — 86900 BLOOD TYPING SEROLOGIC ABO: CPT | Performed by: PHYSICIAN ASSISTANT

## 2022-01-01 PROCEDURE — 86901 BLOOD TYPING SEROLOGIC RH(D): CPT

## 2022-01-01 PROCEDURE — 85025 COMPLETE CBC W/AUTO DIFF WBC: CPT | Performed by: INTERNAL MEDICINE

## 2022-01-01 PROCEDURE — 83605 ASSAY OF LACTIC ACID: CPT | Performed by: FAMILY MEDICINE

## 2022-01-01 PROCEDURE — 94640 AIRWAY INHALATION TREATMENT: CPT

## 2022-01-01 PROCEDURE — 36430 TRANSFUSION BLD/BLD COMPNT: CPT

## 2022-01-01 PROCEDURE — 86901 BLOOD TYPING SEROLOGIC RH(D): CPT | Performed by: PHYSICIAN ASSISTANT

## 2022-01-01 PROCEDURE — 93005 ELECTROCARDIOGRAM TRACING: CPT | Performed by: INTERNAL MEDICINE

## 2022-01-01 PROCEDURE — 99214 OFFICE O/P EST MOD 30 MIN: CPT | Performed by: INTERNAL MEDICINE

## 2022-01-01 PROCEDURE — 94760 N-INVAS EAR/PLS OXIMETRY 1: CPT

## 2022-01-01 PROCEDURE — 83735 ASSAY OF MAGNESIUM: CPT | Performed by: EMERGENCY MEDICINE

## 2022-01-01 PROCEDURE — 86850 RBC ANTIBODY SCREEN: CPT | Performed by: FAMILY MEDICINE

## 2022-01-01 PROCEDURE — 99283 EMERGENCY DEPT VISIT LOW MDM: CPT

## 2022-01-01 PROCEDURE — 36415 COLL VENOUS BLD VENIPUNCTURE: CPT | Performed by: EMERGENCY MEDICINE

## 2022-01-01 PROCEDURE — 84484 ASSAY OF TROPONIN QUANT: CPT | Performed by: EMERGENCY MEDICINE

## 2022-01-01 PROCEDURE — 25010000002 VANCOMYCIN 5 G RECONSTITUTED SOLUTION: Performed by: FAMILY MEDICINE

## 2022-01-01 PROCEDURE — 85027 COMPLETE CBC AUTOMATED: CPT

## 2022-01-01 PROCEDURE — 93005 ELECTROCARDIOGRAM TRACING: CPT | Performed by: FAMILY MEDICINE

## 2022-01-01 PROCEDURE — 85025 COMPLETE CBC W/AUTO DIFF WBC: CPT

## 2022-01-01 PROCEDURE — 83540 ASSAY OF IRON: CPT | Performed by: PHYSICIAN ASSISTANT

## 2022-01-01 PROCEDURE — 82607 VITAMIN B-12: CPT | Performed by: INTERNAL MEDICINE

## 2022-01-01 PROCEDURE — P9016 RBC LEUKOCYTES REDUCED: HCPCS

## 2022-01-01 PROCEDURE — 87186 SC STD MICRODIL/AGAR DIL: CPT | Performed by: PHYSICIAN ASSISTANT

## 2022-01-01 PROCEDURE — 87077 CULTURE AEROBIC IDENTIFY: CPT | Performed by: PHYSICIAN ASSISTANT

## 2022-01-01 PROCEDURE — 85018 HEMOGLOBIN: CPT | Performed by: FAMILY MEDICINE

## 2022-01-01 PROCEDURE — 85025 COMPLETE CBC W/AUTO DIFF WBC: CPT | Performed by: EMERGENCY MEDICINE

## 2022-01-01 PROCEDURE — 85014 HEMATOCRIT: CPT | Performed by: PHYSICIAN ASSISTANT

## 2022-01-01 PROCEDURE — 25010000002 HALOPERIDOL LACTATE PER 5 MG: Performed by: HOSPITALIST

## 2022-01-01 PROCEDURE — 25010000002 FUROSEMIDE PER 20 MG: Performed by: PHYSICIAN ASSISTANT

## 2022-01-01 PROCEDURE — 84466 ASSAY OF TRANSFERRIN: CPT | Performed by: PHYSICIAN ASSISTANT

## 2022-01-01 PROCEDURE — 25010000002 SODIUM CHLORIDE 0.9 % WITH KCL 20 MEQ 20-0.9 MEQ/L-% SOLUTION: Performed by: INTERNAL MEDICINE

## 2022-01-01 PROCEDURE — 87040 BLOOD CULTURE FOR BACTERIA: CPT | Performed by: FAMILY MEDICINE

## 2022-01-01 PROCEDURE — 71045 X-RAY EXAM CHEST 1 VIEW: CPT

## 2022-01-01 PROCEDURE — 92610 EVALUATE SWALLOWING FUNCTION: CPT

## 2022-01-01 PROCEDURE — 25010000002 MAGNESIUM SULFATE 2 GM/50ML SOLUTION: Performed by: INTERNAL MEDICINE

## 2022-01-01 PROCEDURE — 93306 TTE W/DOPPLER COMPLETE: CPT | Performed by: INTERNAL MEDICINE

## 2022-01-01 PROCEDURE — 99222 1ST HOSP IP/OBS MODERATE 55: CPT | Performed by: INTERNAL MEDICINE

## 2022-01-01 PROCEDURE — 94060 EVALUATION OF WHEEZING: CPT

## 2022-01-01 PROCEDURE — 97161 PT EVAL LOW COMPLEX 20 MIN: CPT

## 2022-01-01 PROCEDURE — 84466 ASSAY OF TRANSFERRIN: CPT | Performed by: INTERNAL MEDICINE

## 2022-01-01 PROCEDURE — 87086 URINE CULTURE/COLONY COUNT: CPT | Performed by: PHYSICIAN ASSISTANT

## 2022-01-01 PROCEDURE — 83540 ASSAY OF IRON: CPT | Performed by: INTERNAL MEDICINE

## 2022-01-01 PROCEDURE — P9612 CATHETERIZE FOR URINE SPEC: HCPCS

## 2022-01-01 PROCEDURE — 99284 EMERGENCY DEPT VISIT MOD MDM: CPT

## 2022-01-01 PROCEDURE — 84145 PROCALCITONIN (PCT): CPT | Performed by: PHYSICIAN ASSISTANT

## 2022-01-01 PROCEDURE — 99239 HOSP IP/OBS DSCHRG MGMT >30: CPT | Performed by: INTERNAL MEDICINE

## 2022-01-01 PROCEDURE — 76775 US EXAM ABDO BACK WALL LIM: CPT

## 2022-01-01 PROCEDURE — 99214 OFFICE O/P EST MOD 30 MIN: CPT | Performed by: PHYSICIAN ASSISTANT

## 2022-01-01 PROCEDURE — 97530 THERAPEUTIC ACTIVITIES: CPT

## 2022-01-01 PROCEDURE — 25010000002 FUROSEMIDE PER 20 MG: Performed by: FAMILY MEDICINE

## 2022-01-01 PROCEDURE — 86900 BLOOD TYPING SEROLOGIC ABO: CPT | Performed by: FAMILY MEDICINE

## 2022-01-01 PROCEDURE — 83735 ASSAY OF MAGNESIUM: CPT

## 2022-01-01 PROCEDURE — 81001 URINALYSIS AUTO W/SCOPE: CPT | Performed by: EMERGENCY MEDICINE

## 2022-01-01 PROCEDURE — 87086 URINE CULTURE/COLONY COUNT: CPT | Performed by: INTERNAL MEDICINE

## 2022-01-01 PROCEDURE — 70551 MRI BRAIN STEM W/O DYE: CPT

## 2022-01-01 PROCEDURE — 81001 URINALYSIS AUTO W/SCOPE: CPT | Performed by: PHYSICIAN ASSISTANT

## 2022-01-01 PROCEDURE — 84484 ASSAY OF TROPONIN QUANT: CPT

## 2022-01-01 PROCEDURE — 82820 HEMOGLOBIN-OXYGEN AFFINITY: CPT | Performed by: INTERNAL MEDICINE

## 2022-01-01 PROCEDURE — U0004 COV-19 TEST NON-CDC HGH THRU: HCPCS | Performed by: INTERNAL MEDICINE

## 2022-01-01 PROCEDURE — 83880 ASSAY OF NATRIURETIC PEPTIDE: CPT | Performed by: INTERNAL MEDICINE

## 2022-01-01 PROCEDURE — 84443 ASSAY THYROID STIM HORMONE: CPT

## 2022-01-01 PROCEDURE — 80053 COMPREHEN METABOLIC PANEL: CPT | Performed by: EMERGENCY MEDICINE

## 2022-01-01 PROCEDURE — 94726 PLETHYSMOGRAPHY LUNG VOLUMES: CPT

## 2022-01-01 PROCEDURE — 80069 RENAL FUNCTION PANEL: CPT | Performed by: INTERNAL MEDICINE

## 2022-01-01 PROCEDURE — 86850 RBC ANTIBODY SCREEN: CPT | Performed by: PHYSICIAN ASSISTANT

## 2022-01-01 PROCEDURE — 93010 ELECTROCARDIOGRAM REPORT: CPT | Performed by: INTERNAL MEDICINE

## 2022-01-01 PROCEDURE — 99285 EMERGENCY DEPT VISIT HI MDM: CPT

## 2022-01-01 PROCEDURE — 94729 DIFFUSING CAPACITY: CPT

## 2022-01-01 PROCEDURE — 82607 VITAMIN B-12: CPT | Performed by: PHYSICIAN ASSISTANT

## 2022-01-01 PROCEDURE — 0 POTASSIUM CHLORIDE 10 MEQ/100ML SOLUTION: Performed by: FAMILY MEDICINE

## 2022-01-01 PROCEDURE — 93005 ELECTROCARDIOGRAM TRACING: CPT | Performed by: EMERGENCY MEDICINE

## 2022-01-01 PROCEDURE — 82274 ASSAY TEST FOR BLOOD FECAL: CPT | Performed by: PHYSICIAN ASSISTANT

## 2022-01-01 PROCEDURE — 83880 ASSAY OF NATRIURETIC PEPTIDE: CPT | Performed by: NURSE PRACTITIONER

## 2022-01-01 PROCEDURE — 94729 DIFFUSING CAPACITY: CPT | Performed by: INTERNAL MEDICINE

## 2022-01-01 PROCEDURE — 82140 ASSAY OF AMMONIA: CPT | Performed by: INTERNAL MEDICINE

## 2022-01-01 PROCEDURE — 93306 TTE W/DOPPLER COMPLETE: CPT

## 2022-01-01 PROCEDURE — 82962 GLUCOSE BLOOD TEST: CPT

## 2022-01-01 PROCEDURE — 25010000002 MAGNESIUM SULFATE IN D5W 1G/100ML (PREMIX) 1-5 GM/100ML-% SOLUTION: Performed by: FAMILY MEDICINE

## 2022-01-01 PROCEDURE — 83880 ASSAY OF NATRIURETIC PEPTIDE: CPT | Performed by: PHYSICIAN ASSISTANT

## 2022-01-01 PROCEDURE — 97166 OT EVAL MOD COMPLEX 45 MIN: CPT

## 2022-01-01 PROCEDURE — 25010000002 LEVOFLOXACIN PER 250 MG: Performed by: PHYSICIAN ASSISTANT

## 2022-01-01 PROCEDURE — 97110 THERAPEUTIC EXERCISES: CPT

## 2022-01-01 RX ORDER — SODIUM CHLORIDE 0.9 % (FLUSH) 0.9 %
10 SYRINGE (ML) INJECTION AS NEEDED
Status: DISCONTINUED | OUTPATIENT
Start: 2022-01-01 | End: 2022-01-01

## 2022-01-01 RX ORDER — MORPHINE SULFATE 20 MG/ML
5 SOLUTION ORAL
Qty: 180 ML | Refills: 0
Start: 2022-01-01 | End: 2022-09-23

## 2022-01-01 RX ORDER — AMLODIPINE BESYLATE 2.5 MG/1
2.5 TABLET ORAL
Status: DISCONTINUED | OUTPATIENT
Start: 2022-01-01 | End: 2022-01-01

## 2022-01-01 RX ORDER — DONEPEZIL HYDROCHLORIDE 5 MG/1
5 TABLET, FILM COATED ORAL NIGHTLY
Status: DISCONTINUED | OUTPATIENT
Start: 2022-01-01 | End: 2022-01-01 | Stop reason: HOSPADM

## 2022-01-01 RX ORDER — HALOPERIDOL 2 MG/ML
0.5 SOLUTION ORAL
Start: 2022-01-01

## 2022-01-01 RX ORDER — MAGNESIUM SULFATE HEPTAHYDRATE 40 MG/ML
2 INJECTION, SOLUTION INTRAVENOUS ONCE
Status: COMPLETED | OUTPATIENT
Start: 2022-01-01 | End: 2022-01-01

## 2022-01-01 RX ORDER — SODIUM CHLORIDE 0.9 % (FLUSH) 0.9 %
10 SYRINGE (ML) INJECTION AS NEEDED
Status: DISCONTINUED | OUTPATIENT
Start: 2022-01-01 | End: 2022-01-01 | Stop reason: HOSPADM

## 2022-01-01 RX ORDER — ALBUTEROL SULFATE 2.5 MG/3ML
2.5 SOLUTION RESPIRATORY (INHALATION) ONCE
Status: COMPLETED | OUTPATIENT
Start: 2022-01-01 | End: 2022-01-01

## 2022-01-01 RX ORDER — ARFORMOTEROL TARTRATE 15 UG/2ML
15 SOLUTION RESPIRATORY (INHALATION)
Status: DISCONTINUED | OUTPATIENT
Start: 2022-01-01 | End: 2022-01-01 | Stop reason: HOSPADM

## 2022-01-01 RX ORDER — NITROGLYCERIN 0.4 MG/1
0.4 TABLET SUBLINGUAL
Refills: 12
Start: 2022-01-01

## 2022-01-01 RX ORDER — HALOPERIDOL 5 MG/ML
2 INJECTION INTRAMUSCULAR ONCE
Status: COMPLETED | OUTPATIENT
Start: 2022-01-01 | End: 2022-01-01

## 2022-01-01 RX ORDER — CHOLECALCIFEROL (VITAMIN D3) 125 MCG
1000 CAPSULE ORAL DAILY
Status: DISCONTINUED | OUTPATIENT
Start: 2022-01-01 | End: 2022-01-01 | Stop reason: HOSPADM

## 2022-01-01 RX ORDER — AMLODIPINE BESYLATE 5 MG/1
5 TABLET ORAL DAILY
Status: DISCONTINUED | OUTPATIENT
Start: 2022-01-01 | End: 2022-01-01

## 2022-01-01 RX ORDER — SODIUM CHLORIDE 9 MG/ML
40 INJECTION, SOLUTION INTRAVENOUS AS NEEDED
Status: DISCONTINUED | OUTPATIENT
Start: 2022-01-01 | End: 2022-01-01 | Stop reason: HOSPADM

## 2022-01-01 RX ORDER — ASPIRIN 81 MG/1
324 TABLET, CHEWABLE ORAL ONCE
Status: DISCONTINUED | OUTPATIENT
Start: 2022-01-01 | End: 2022-01-01

## 2022-01-01 RX ORDER — ACETAMINOPHEN 325 MG/1
650 TABLET ORAL EVERY 4 HOURS PRN
Start: 2022-01-01

## 2022-01-01 RX ORDER — ACETAMINOPHEN 160 MG/5ML
650 SOLUTION ORAL EVERY 4 HOURS PRN
Start: 2022-01-01

## 2022-01-01 RX ORDER — FLUOXETINE 10 MG/1
10 CAPSULE ORAL DAILY
Status: DISCONTINUED | OUTPATIENT
Start: 2022-01-01 | End: 2022-01-01 | Stop reason: HOSPADM

## 2022-01-01 RX ORDER — IPRATROPIUM BROMIDE AND ALBUTEROL SULFATE 2.5; .5 MG/3ML; MG/3ML
3 SOLUTION RESPIRATORY (INHALATION)
Status: DISCONTINUED | OUTPATIENT
Start: 2022-01-01 | End: 2022-01-01

## 2022-01-01 RX ORDER — FLUOXETINE 10 MG/1
CAPSULE ORAL
Qty: 90 CAPSULE | Refills: 1 | Status: SHIPPED | OUTPATIENT
Start: 2022-01-01

## 2022-01-01 RX ORDER — ATROPINE SULFATE 10 MG/ML
SOLUTION/ DROPS OPHTHALMIC
Refills: 12
Start: 2022-01-01

## 2022-01-01 RX ORDER — AMLODIPINE BESYLATE AND BENAZEPRIL HYDROCHLORIDE 5; 20 MG/1; MG/1
CAPSULE ORAL
Qty: 90 CAPSULE | Refills: 1 | Status: SHIPPED | OUTPATIENT
Start: 2022-01-01 | End: 2022-01-01 | Stop reason: HOSPADM

## 2022-01-01 RX ORDER — ASPIRIN 81 MG/1
81 TABLET, CHEWABLE ORAL EVERY OTHER DAY
COMMUNITY
End: 2022-01-01 | Stop reason: HOSPADM

## 2022-01-01 RX ORDER — HALOPERIDOL 0.5 MG/1
0.5 TABLET ORAL
Status: DISCONTINUED | OUTPATIENT
Start: 2022-01-01 | End: 2022-01-01 | Stop reason: HOSPADM

## 2022-01-01 RX ORDER — LEVOFLOXACIN 5 MG/ML
750 INJECTION, SOLUTION INTRAVENOUS
Status: DISCONTINUED | OUTPATIENT
Start: 2022-01-01 | End: 2022-01-01

## 2022-01-01 RX ORDER — LORAZEPAM 2 MG/ML
0.5 CONCENTRATE ORAL
Status: DISCONTINUED | OUTPATIENT
Start: 2022-01-01 | End: 2022-01-01 | Stop reason: HOSPADM

## 2022-01-01 RX ORDER — BUDESONIDE 0.5 MG/2ML
0.5 INHALANT ORAL 2 TIMES DAILY
Status: DISCONTINUED | OUTPATIENT
Start: 2022-01-01 | End: 2022-01-01 | Stop reason: HOSPADM

## 2022-01-01 RX ORDER — MORPHINE SULFATE 2 MG/ML
2 INJECTION, SOLUTION INTRAMUSCULAR; INTRAVENOUS
Status: DISCONTINUED | OUTPATIENT
Start: 2022-01-01 | End: 2022-01-01 | Stop reason: HOSPADM

## 2022-01-01 RX ORDER — SODIUM CHLORIDE 9 MG/ML
100 INJECTION, SOLUTION INTRAVENOUS CONTINUOUS
Status: DISCONTINUED | OUTPATIENT
Start: 2022-01-01 | End: 2022-01-01

## 2022-01-01 RX ORDER — LORAZEPAM 2 MG/ML
0.5 INJECTION INTRAMUSCULAR
Status: DISCONTINUED | OUTPATIENT
Start: 2022-01-01 | End: 2022-01-01

## 2022-01-01 RX ORDER — BISACODYL 10 MG
10 SUPPOSITORY, RECTAL RECTAL DAILY PRN
Status: DISCONTINUED | OUTPATIENT
Start: 2022-01-01 | End: 2022-01-01 | Stop reason: HOSPADM

## 2022-01-01 RX ORDER — POTASSIUM CHLORIDE 20MEQ/15ML
LIQUID (ML) ORAL
Qty: 900 ML | Refills: 3 | Status: SHIPPED | OUTPATIENT
Start: 2022-01-01 | End: 2022-01-01 | Stop reason: HOSPADM

## 2022-01-01 RX ORDER — MONTELUKAST SODIUM 10 MG/1
10 TABLET ORAL DAILY
Status: DISCONTINUED | OUTPATIENT
Start: 2022-01-01 | End: 2022-01-01 | Stop reason: HOSPADM

## 2022-01-01 RX ORDER — ALBUTEROL SULFATE 2.5 MG/3ML
2.5 SOLUTION RESPIRATORY (INHALATION) EVERY 4 HOURS PRN
Status: DISCONTINUED | OUTPATIENT
Start: 2022-01-01 | End: 2022-01-01 | Stop reason: HOSPADM

## 2022-01-01 RX ORDER — LANOLIN ALCOHOL/MO/W.PET/CERES
1000 CREAM (GRAM) TOPICAL DAILY
Qty: 90 TABLET | Refills: 1 | Status: SHIPPED | OUTPATIENT
Start: 2022-01-01

## 2022-01-01 RX ORDER — POTASSIUM CHLORIDE 750 MG/1
20 CAPSULE, EXTENDED RELEASE ORAL 2 TIMES DAILY WITH MEALS
Status: COMPLETED | OUTPATIENT
Start: 2022-01-01 | End: 2022-01-01

## 2022-01-01 RX ORDER — BISACODYL 5 MG/1
5 TABLET, DELAYED RELEASE ORAL DAILY PRN
Status: DISCONTINUED | OUTPATIENT
Start: 2022-01-01 | End: 2022-01-01 | Stop reason: HOSPADM

## 2022-01-01 RX ORDER — PANTOPRAZOLE SODIUM 40 MG/10ML
40 INJECTION, POWDER, LYOPHILIZED, FOR SOLUTION INTRAVENOUS
Status: DISCONTINUED | OUTPATIENT
Start: 2022-01-01 | End: 2022-01-01 | Stop reason: HOSPADM

## 2022-01-01 RX ORDER — FOLIC ACID 1 MG/1
1 TABLET ORAL 2 TIMES DAILY
Status: DISCONTINUED | OUTPATIENT
Start: 2022-01-01 | End: 2022-01-01

## 2022-01-01 RX ORDER — POLYETHYLENE GLYCOL 3350 17 G/17G
17 POWDER, FOR SOLUTION ORAL DAILY PRN
Status: DISCONTINUED | OUTPATIENT
Start: 2022-01-01 | End: 2022-01-01 | Stop reason: HOSPADM

## 2022-01-01 RX ORDER — ALBUTEROL SULFATE 90 UG/1
AEROSOL, METERED RESPIRATORY (INHALATION)
Qty: 6.7 G | Refills: 3 | Status: SHIPPED | OUTPATIENT
Start: 2022-01-01

## 2022-01-01 RX ORDER — IPRATROPIUM BROMIDE AND ALBUTEROL SULFATE 2.5; .5 MG/3ML; MG/3ML
3 SOLUTION RESPIRATORY (INHALATION) ONCE
Status: COMPLETED | OUTPATIENT
Start: 2022-01-01 | End: 2022-01-01

## 2022-01-01 RX ORDER — ACETAMINOPHEN 160 MG/5ML
650 SOLUTION ORAL EVERY 4 HOURS PRN
Status: DISCONTINUED | OUTPATIENT
Start: 2022-01-01 | End: 2022-01-01 | Stop reason: HOSPADM

## 2022-01-01 RX ORDER — IPRATROPIUM BROMIDE AND ALBUTEROL SULFATE 2.5; .5 MG/3ML; MG/3ML
3 SOLUTION RESPIRATORY (INHALATION) EVERY 4 HOURS PRN
Status: DISCONTINUED | OUTPATIENT
Start: 2022-01-01 | End: 2022-01-01 | Stop reason: HOSPADM

## 2022-01-01 RX ORDER — BUDESONIDE 0.5 MG/2ML
0.5 INHALANT ORAL
Status: DISCONTINUED | OUTPATIENT
Start: 2022-01-01 | End: 2022-01-01

## 2022-01-01 RX ORDER — SODIUM CHLORIDE, SODIUM LACTATE, POTASSIUM CHLORIDE, CALCIUM CHLORIDE 600; 310; 30; 20 MG/100ML; MG/100ML; MG/100ML; MG/100ML
75 INJECTION, SOLUTION INTRAVENOUS CONTINUOUS
Status: DISCONTINUED | OUTPATIENT
Start: 2022-01-01 | End: 2022-01-01

## 2022-01-01 RX ORDER — AMOXICILLIN 250 MG
2 CAPSULE ORAL 2 TIMES DAILY PRN
Status: DISCONTINUED | OUTPATIENT
Start: 2022-01-01 | End: 2022-01-01 | Stop reason: HOSPADM

## 2022-01-01 RX ORDER — SPIRONOLACTONE 25 MG/1
TABLET ORAL
Qty: 90 TABLET | Refills: 0 | Status: SHIPPED | OUTPATIENT
Start: 2022-01-01 | End: 2022-01-01 | Stop reason: HOSPADM

## 2022-01-01 RX ORDER — ATROPINE SULFATE 10 MG/ML
2 SOLUTION/ DROPS OPHTHALMIC
Status: DISCONTINUED | OUTPATIENT
Start: 2022-01-01 | End: 2022-01-01 | Stop reason: HOSPADM

## 2022-01-01 RX ORDER — FERROUS SULFATE 325(65) MG
325 TABLET ORAL
Status: DISCONTINUED | OUTPATIENT
Start: 2022-01-01 | End: 2022-01-01 | Stop reason: HOSPADM

## 2022-01-01 RX ORDER — AMLODIPINE BESYLATE 5 MG/1
5 TABLET ORAL DAILY
Start: 2022-01-01

## 2022-01-01 RX ORDER — ACETAMINOPHEN 325 MG/1
650 TABLET ORAL EVERY 4 HOURS PRN
Status: DISCONTINUED | OUTPATIENT
Start: 2022-01-01 | End: 2022-01-01 | Stop reason: HOSPADM

## 2022-01-01 RX ORDER — CHOLECALCIFEROL (VITAMIN D3) 125 MCG
5 CAPSULE ORAL NIGHTLY PRN
Status: DISCONTINUED | OUTPATIENT
Start: 2022-01-01 | End: 2022-01-01 | Stop reason: HOSPADM

## 2022-01-01 RX ORDER — LANOLIN ALCOHOL/MO/W.PET/CERES
325 CREAM (GRAM) TOPICAL 2 TIMES DAILY
Qty: 60 TABLET | Refills: 2 | Status: SHIPPED | OUTPATIENT
Start: 2022-01-01 | End: 2023-06-14

## 2022-01-01 RX ORDER — HALOPERIDOL 2 MG/ML
0.5 SOLUTION ORAL
Status: DISCONTINUED | OUTPATIENT
Start: 2022-01-01 | End: 2022-01-01 | Stop reason: HOSPADM

## 2022-01-01 RX ORDER — NITROGLYCERIN 0.4 MG/1
0.4 TABLET SUBLINGUAL
Status: DISCONTINUED | OUTPATIENT
Start: 2022-01-01 | End: 2022-01-01

## 2022-01-01 RX ORDER — FENTANYL/ROPIVACAINE/NS/PF 2-625MCG/1
15 PLASTIC BAG, INJECTION (ML) EPIDURAL ONCE
Status: DISCONTINUED | OUTPATIENT
Start: 2022-01-01 | End: 2022-01-01

## 2022-01-01 RX ORDER — DILTIAZEM HCL IN NACL,ISO-OSM 125 MG/125
5-15 PLASTIC BAG, INJECTION (ML) INTRAVENOUS
Status: DISCONTINUED | OUTPATIENT
Start: 2022-01-01 | End: 2022-01-01

## 2022-01-01 RX ORDER — MORPHINE SULFATE 20 MG/ML
5 SOLUTION ORAL
Status: DISCONTINUED | OUTPATIENT
Start: 2022-01-01 | End: 2022-01-01 | Stop reason: HOSPADM

## 2022-01-01 RX ORDER — ASPIRIN 300 MG/1
300 SUPPOSITORY RECTAL ONCE
Status: COMPLETED | OUTPATIENT
Start: 2022-01-01 | End: 2022-01-01

## 2022-01-01 RX ORDER — ALENDRONATE SODIUM 70 MG/1
70 TABLET ORAL WEEKLY
Qty: 4 TABLET | Refills: 3 | Status: SHIPPED | OUTPATIENT
Start: 2022-01-01

## 2022-01-01 RX ORDER — FUROSEMIDE 10 MG/ML
40 INJECTION INTRAMUSCULAR; INTRAVENOUS ONCE
Status: COMPLETED | OUTPATIENT
Start: 2022-01-01 | End: 2022-01-01

## 2022-01-01 RX ORDER — CEFTRIAXONE SODIUM 1 G/50ML
1 INJECTION, SOLUTION INTRAVENOUS EVERY 24 HOURS
Status: DISCONTINUED | OUTPATIENT
Start: 2022-01-01 | End: 2022-01-01

## 2022-01-01 RX ORDER — AZITHROMYCIN 250 MG/1
TABLET, FILM COATED ORAL
Qty: 6 TABLET | Refills: 0 | Status: SHIPPED | OUTPATIENT
Start: 2022-01-01 | End: 2022-01-01

## 2022-01-01 RX ORDER — MIRTAZAPINE 15 MG/1
15 TABLET, FILM COATED ORAL NIGHTLY
Status: DISCONTINUED | OUTPATIENT
Start: 2022-01-01 | End: 2022-01-01 | Stop reason: HOSPADM

## 2022-01-01 RX ORDER — ERGOCALCIFEROL 1.25 MG/1
50000 CAPSULE ORAL WEEKLY
Qty: 13 CAPSULE | Refills: 1 | Status: SHIPPED | OUTPATIENT
Start: 2022-01-01

## 2022-01-01 RX ORDER — SODIUM CHLORIDE AND POTASSIUM CHLORIDE 150; 900 MG/100ML; MG/100ML
100 INJECTION, SOLUTION INTRAVENOUS CONTINUOUS
Status: ACTIVE | OUTPATIENT
Start: 2022-01-01 | End: 2022-01-01

## 2022-01-01 RX ORDER — SODIUM CHLORIDE 9 MG/ML
40 INJECTION, SOLUTION INTRAVENOUS AS NEEDED
Status: DISCONTINUED | OUTPATIENT
Start: 2022-01-01 | End: 2022-01-01

## 2022-01-01 RX ORDER — ATORVASTATIN CALCIUM 40 MG/1
80 TABLET, FILM COATED ORAL NIGHTLY
Status: DISCONTINUED | OUTPATIENT
Start: 2022-01-01 | End: 2022-01-01 | Stop reason: HOSPADM

## 2022-01-01 RX ORDER — HALOPERIDOL 5 MG/ML
0.5 INJECTION INTRAMUSCULAR EVERY 6 HOURS PRN
Status: DISCONTINUED | OUTPATIENT
Start: 2022-01-01 | End: 2022-01-01

## 2022-01-01 RX ORDER — DEXTROSE MONOHYDRATE 50 MG/ML
75 INJECTION, SOLUTION INTRAVENOUS CONTINUOUS
Status: ACTIVE | OUTPATIENT
Start: 2022-01-01 | End: 2022-01-01

## 2022-01-01 RX ORDER — POTASSIUM CHLORIDE 7.45 MG/ML
10 INJECTION INTRAVENOUS
Status: COMPLETED | OUTPATIENT
Start: 2022-01-01 | End: 2022-01-01

## 2022-01-01 RX ORDER — AMLODIPINE BESYLATE 5 MG/1
5 TABLET ORAL DAILY
Status: DISCONTINUED | OUTPATIENT
Start: 2022-01-01 | End: 2022-01-01 | Stop reason: HOSPADM

## 2022-01-01 RX ORDER — IPRATROPIUM BROMIDE AND ALBUTEROL SULFATE 2.5; .5 MG/3ML; MG/3ML
3 SOLUTION RESPIRATORY (INHALATION) EVERY 4 HOURS PRN
Qty: 3 ML | Refills: 11
Start: 2022-01-01

## 2022-01-01 RX ORDER — HALOPERIDOL 5 MG/ML
0.5 INJECTION INTRAMUSCULAR
Status: DISCONTINUED | OUTPATIENT
Start: 2022-01-01 | End: 2022-01-01

## 2022-01-01 RX ORDER — FENTANYL/ROPIVACAINE/NS/PF 2-625MCG/1
15 PLASTIC BAG, INJECTION (ML) EPIDURAL
Status: COMPLETED | OUTPATIENT
Start: 2022-01-01 | End: 2022-01-01

## 2022-01-01 RX ORDER — LEVOFLOXACIN 750 MG/1
750 TABLET ORAL EVERY OTHER DAY
Status: DISPENSED | OUTPATIENT
Start: 2022-01-01 | End: 2022-01-01

## 2022-01-01 RX ORDER — ACETAMINOPHEN 650 MG/1
650 SUPPOSITORY RECTAL EVERY 4 HOURS PRN
Status: DISCONTINUED | OUTPATIENT
Start: 2022-01-01 | End: 2022-01-01 | Stop reason: HOSPADM

## 2022-01-01 RX ORDER — LORAZEPAM 2 MG/ML
0.5 CONCENTRATE ORAL
Start: 2022-01-01 | End: 2022-09-23

## 2022-01-01 RX ORDER — FUROSEMIDE 10 MG/ML
20 INJECTION INTRAMUSCULAR; INTRAVENOUS ONCE
Status: COMPLETED | OUTPATIENT
Start: 2022-01-01 | End: 2022-01-01

## 2022-01-01 RX ORDER — SODIUM CHLORIDE 0.9 % (FLUSH) 0.9 %
10 SYRINGE (ML) INJECTION EVERY 12 HOURS SCHEDULED
Status: DISCONTINUED | OUTPATIENT
Start: 2022-01-01 | End: 2022-01-01

## 2022-01-01 RX ORDER — LORAZEPAM 0.5 MG/1
0.5 TABLET ORAL
Status: DISCONTINUED | OUTPATIENT
Start: 2022-01-01 | End: 2022-01-01 | Stop reason: HOSPADM

## 2022-01-01 RX ORDER — APIXABAN 2.5 MG/1
TABLET, FILM COATED ORAL
Qty: 180 TABLET | Refills: 1 | Status: SHIPPED | OUTPATIENT
Start: 2022-01-01

## 2022-01-01 RX ORDER — ACETAMINOPHEN 650 MG/1
650 SUPPOSITORY RECTAL EVERY 4 HOURS PRN
Start: 2022-01-01

## 2022-01-01 RX ORDER — FLUOXETINE 10 MG/1
CAPSULE ORAL
Qty: 90 CAPSULE | Refills: 1 | Status: SHIPPED | OUTPATIENT
Start: 2022-01-01 | End: 2022-01-01

## 2022-01-01 RX ORDER — ONDANSETRON 4 MG/1
4 TABLET, FILM COATED ORAL EVERY 6 HOURS PRN
Start: 2022-01-01

## 2022-01-01 RX ORDER — ATORVASTATIN CALCIUM 80 MG/1
80 TABLET, FILM COATED ORAL DAILY
Qty: 90 TABLET | Refills: 3 | Status: SHIPPED | OUTPATIENT
Start: 2022-01-01

## 2022-01-01 RX ORDER — MAGNESIUM SULFATE 1 G/100ML
1 INJECTION INTRAVENOUS
Status: COMPLETED | OUTPATIENT
Start: 2022-01-01 | End: 2022-01-01

## 2022-01-01 RX ORDER — NITROGLYCERIN 0.4 MG/1
0.4 TABLET SUBLINGUAL
Status: DISCONTINUED | OUTPATIENT
Start: 2022-01-01 | End: 2022-01-01 | Stop reason: HOSPADM

## 2022-01-01 RX ORDER — POTASSIUM CHLORIDE 1.5 G/1.77G
20 POWDER, FOR SOLUTION ORAL 2 TIMES DAILY
COMMUNITY

## 2022-01-01 RX ORDER — POTASSIUM CHLORIDE 7.45 MG/ML
10 INJECTION INTRAVENOUS
Status: DISCONTINUED | OUTPATIENT
Start: 2022-01-01 | End: 2022-01-01

## 2022-01-01 RX ORDER — ONDANSETRON 4 MG/1
4 TABLET, FILM COATED ORAL EVERY 6 HOURS PRN
Status: DISCONTINUED | OUTPATIENT
Start: 2022-01-01 | End: 2022-01-01 | Stop reason: HOSPADM

## 2022-01-01 RX ORDER — ERGOCALCIFEROL 1.25 MG/1
50000 CAPSULE ORAL WEEKLY
COMMUNITY
End: 2022-01-01 | Stop reason: SDUPTHER

## 2022-01-01 RX ORDER — SODIUM CHLORIDE 0.9 % (FLUSH) 0.9 %
10 SYRINGE (ML) INJECTION EVERY 12 HOURS SCHEDULED
Status: DISCONTINUED | OUTPATIENT
Start: 2022-01-01 | End: 2022-01-01 | Stop reason: HOSPADM

## 2022-01-01 RX ORDER — MAGNESIUM OXIDE TAB 400 MG (241.3 MG ELEMENTAL MG) 400 (241.3 MG) MG
TAB ORAL
Qty: 180 TABLET | Refills: 0 | Status: SHIPPED | OUTPATIENT
Start: 2022-01-01 | End: 2022-01-01 | Stop reason: HOSPADM

## 2022-01-01 RX ORDER — LORAZEPAM 2 MG/ML
0.5 INJECTION INTRAMUSCULAR
Status: DISCONTINUED | OUTPATIENT
Start: 2022-01-01 | End: 2022-01-01 | Stop reason: HOSPADM

## 2022-01-01 RX ORDER — MONTELUKAST SODIUM 10 MG/1
TABLET ORAL
Qty: 30 TABLET | Refills: 3 | Status: SHIPPED | OUTPATIENT
Start: 2022-01-01

## 2022-01-01 RX ORDER — MIRTAZAPINE 15 MG/1
15 TABLET, FILM COATED ORAL DAILY
COMMUNITY

## 2022-01-01 RX ADMIN — FOLIC ACID 1 MG: 1 TABLET ORAL at 21:32

## 2022-01-01 RX ADMIN — BUDESONIDE 0.5 MG: 0.5 SUSPENSION RESPIRATORY (INHALATION) at 07:47

## 2022-01-01 RX ADMIN — ARFORMOTEROL TARTRATE 15 MCG: 15 SOLUTION RESPIRATORY (INHALATION) at 08:10

## 2022-01-01 RX ADMIN — Medication 10 ML: at 08:31

## 2022-01-01 RX ADMIN — DONEPEZIL HYDROCHLORIDE 5 MG: 5 TABLET ORAL at 21:55

## 2022-01-01 RX ADMIN — METOPROLOL TARTRATE 2.5 MG: 5 INJECTION INTRAVENOUS at 18:12

## 2022-01-01 RX ADMIN — POTASSIUM & SODIUM PHOSPHATES POWDER PACK 280-160-250 MG 2 PACKET: 280-160-250 PACK at 16:59

## 2022-01-01 RX ADMIN — APIXABAN 2.5 MG: 2.5 TABLET, FILM COATED ORAL at 21:16

## 2022-01-01 RX ADMIN — POTASSIUM & SODIUM PHOSPHATES POWDER PACK 280-160-250 MG 2 PACKET: 280-160-250 PACK at 08:15

## 2022-01-01 RX ADMIN — BUDESONIDE 0.5 MG: 0.5 SUSPENSION RESPIRATORY (INHALATION) at 20:00

## 2022-01-01 RX ADMIN — SODIUM CHLORIDE 500 ML: 9 INJECTION, SOLUTION INTRAVENOUS at 22:13

## 2022-01-01 RX ADMIN — FLUOXETINE HYDROCHLORIDE 10 MG: 10 CAPSULE ORAL at 09:18

## 2022-01-01 RX ADMIN — METOPROLOL TARTRATE 2.5 MG: 5 INJECTION INTRAVENOUS at 09:47

## 2022-01-01 RX ADMIN — DONEPEZIL HYDROCHLORIDE 5 MG: 5 TABLET ORAL at 21:19

## 2022-01-01 RX ADMIN — FERROUS SULFATE TAB 325 MG (65 MG ELEMENTAL FE) 325 MG: 325 (65 FE) TAB at 08:42

## 2022-01-01 RX ADMIN — METOPROLOL TARTRATE 2.5 MG: 5 INJECTION INTRAVENOUS at 05:51

## 2022-01-01 RX ADMIN — Medication 10 ML: at 21:35

## 2022-01-01 RX ADMIN — APIXABAN 2.5 MG: 2.5 TABLET, FILM COATED ORAL at 20:20

## 2022-01-01 RX ADMIN — DONEPEZIL HYDROCHLORIDE 5 MG: 5 TABLET ORAL at 21:32

## 2022-01-01 RX ADMIN — BUDESONIDE 0.5 MG: 0.5 SUSPENSION RESPIRATORY (INHALATION) at 19:03

## 2022-01-01 RX ADMIN — CYANOCOBALAMIN TAB 500 MCG 1000 MCG: 500 TAB at 09:18

## 2022-01-01 RX ADMIN — Medication 10 ML: at 20:49

## 2022-01-01 RX ADMIN — POTASSIUM CHLORIDE 10 MEQ: 7.46 INJECTION, SOLUTION INTRAVENOUS at 14:48

## 2022-01-01 RX ADMIN — Medication 10 ML: at 23:05

## 2022-01-01 RX ADMIN — POTASSIUM CHLORIDE 20 MEQ: 750 CAPSULE, EXTENDED RELEASE ORAL at 09:23

## 2022-01-01 RX ADMIN — BUDESONIDE 0.5 MG: 0.5 SUSPENSION RESPIRATORY (INHALATION) at 18:20

## 2022-01-01 RX ADMIN — LEVOFLOXACIN 750 MG: 750 TABLET, FILM COATED ORAL at 08:06

## 2022-01-01 RX ADMIN — FOLIC ACID 1 MG: 1 TABLET ORAL at 11:32

## 2022-01-01 RX ADMIN — IPRATROPIUM BROMIDE AND ALBUTEROL SULFATE 3 ML: 2.5; .5 SOLUTION RESPIRATORY (INHALATION) at 08:37

## 2022-01-01 RX ADMIN — APIXABAN 2.5 MG: 2.5 TABLET, FILM COATED ORAL at 20:31

## 2022-01-01 RX ADMIN — METOPROLOL TARTRATE 12.5 MG: 25 TABLET, FILM COATED ORAL at 11:32

## 2022-01-01 RX ADMIN — MIRTAZAPINE 15 MG: 15 TABLET, FILM COATED ORAL at 21:35

## 2022-01-01 RX ADMIN — Medication 10 ML: at 09:34

## 2022-01-01 RX ADMIN — AMLODIPINE BESYLATE 5 MG: 5 TABLET ORAL at 08:16

## 2022-01-01 RX ADMIN — FLUOXETINE HYDROCHLORIDE 10 MG: 10 CAPSULE ORAL at 09:14

## 2022-01-01 RX ADMIN — FLUOXETINE HYDROCHLORIDE 10 MG: 10 CAPSULE ORAL at 08:31

## 2022-01-01 RX ADMIN — BUDESONIDE 0.5 MG: 0.5 SUSPENSION RESPIRATORY (INHALATION) at 06:40

## 2022-01-01 RX ADMIN — SODIUM CHLORIDE 100 ML/HR: 9 INJECTION, SOLUTION INTRAVENOUS at 06:46

## 2022-01-01 RX ADMIN — BUDESONIDE 0.5 MG: 0.5 SUSPENSION RESPIRATORY (INHALATION) at 19:25

## 2022-01-01 RX ADMIN — CYANOCOBALAMIN TAB 500 MCG 1000 MCG: 500 TAB at 08:31

## 2022-01-01 RX ADMIN — BUDESONIDE 0.5 MG: 0.5 SUSPENSION RESPIRATORY (INHALATION) at 08:32

## 2022-01-01 RX ADMIN — DONEPEZIL HYDROCHLORIDE 5 MG: 5 TABLET ORAL at 20:03

## 2022-01-01 RX ADMIN — BUDESONIDE 0.5 MG: 0.5 SUSPENSION RESPIRATORY (INHALATION) at 18:16

## 2022-01-01 RX ADMIN — BUDESONIDE 0.5 MG: 0.5 SUSPENSION RESPIRATORY (INHALATION) at 07:20

## 2022-01-01 RX ADMIN — MEROPENEM 1 G: 1 INJECTION, POWDER, FOR SOLUTION INTRAVENOUS at 17:43

## 2022-01-01 RX ADMIN — MAGNESIUM SULFATE HEPTAHYDRATE 1 G: 10 INJECTION, SOLUTION INTRAVENOUS at 14:48

## 2022-01-01 RX ADMIN — Medication 10 ML: at 09:50

## 2022-01-01 RX ADMIN — MAGNESIUM SULFATE HEPTAHYDRATE 1 G: 10 INJECTION, SOLUTION INTRAVENOUS at 13:50

## 2022-01-01 RX ADMIN — AMLODIPINE BESYLATE 5 MG: 5 TABLET ORAL at 08:31

## 2022-01-01 RX ADMIN — PANTOPRAZOLE SODIUM 40 MG: 40 INJECTION, POWDER, FOR SOLUTION INTRAVENOUS at 06:44

## 2022-01-01 RX ADMIN — FERROUS SULFATE TAB 325 MG (65 MG ELEMENTAL FE) 325 MG: 325 (65 FE) TAB at 09:30

## 2022-01-01 RX ADMIN — FUROSEMIDE 40 MG: 10 INJECTION, SOLUTION INTRAMUSCULAR; INTRAVENOUS at 08:05

## 2022-01-01 RX ADMIN — APIXABAN 2.5 MG: 2.5 TABLET, FILM COATED ORAL at 21:32

## 2022-01-01 RX ADMIN — POTASSIUM & SODIUM PHOSPHATES POWDER PACK 280-160-250 MG 2 PACKET: 280-160-250 PACK at 06:37

## 2022-01-01 RX ADMIN — APIXABAN 2.5 MG: 2.5 TABLET, FILM COATED ORAL at 21:18

## 2022-01-01 RX ADMIN — ARFORMOTEROL TARTRATE 15 MCG: 15 SOLUTION RESPIRATORY (INHALATION) at 21:48

## 2022-01-01 RX ADMIN — MAGNESIUM OXIDE TAB 400 MG (241.3 MG ELEMENTAL MG) 400 MG: 400 (241.3 MG) TAB at 11:32

## 2022-01-01 RX ADMIN — SODIUM CHLORIDE, POTASSIUM CHLORIDE, SODIUM LACTATE AND CALCIUM CHLORIDE 75 ML/HR: 600; 310; 30; 20 INJECTION, SOLUTION INTRAVENOUS at 22:48

## 2022-01-01 RX ADMIN — FOLIC ACID 1 MG: 1 TABLET ORAL at 09:43

## 2022-01-01 RX ADMIN — MIRTAZAPINE 15 MG: 15 TABLET, FILM COATED ORAL at 20:20

## 2022-01-01 RX ADMIN — Medication 10 ML: at 22:13

## 2022-01-01 RX ADMIN — FOLIC ACID 1 MG: 1 TABLET ORAL at 21:19

## 2022-01-01 RX ADMIN — ARFORMOTEROL TARTRATE 15 MCG: 15 SOLUTION RESPIRATORY (INHALATION) at 08:03

## 2022-01-01 RX ADMIN — FLUOXETINE HYDROCHLORIDE 10 MG: 10 CAPSULE ORAL at 08:09

## 2022-01-01 RX ADMIN — DONEPEZIL HYDROCHLORIDE 5 MG: 5 TABLET ORAL at 21:34

## 2022-01-01 RX ADMIN — FOLIC ACID 1 MG: 1 TABLET ORAL at 23:04

## 2022-01-01 RX ADMIN — FLUOXETINE HYDROCHLORIDE 10 MG: 10 CAPSULE ORAL at 08:10

## 2022-01-01 RX ADMIN — BUDESONIDE 0.5 MG: 0.5 SUSPENSION RESPIRATORY (INHALATION) at 20:30

## 2022-01-01 RX ADMIN — FLUOXETINE HYDROCHLORIDE 10 MG: 10 CAPSULE ORAL at 10:15

## 2022-01-01 RX ADMIN — FOLIC ACID 1 MG: 1 TABLET ORAL at 20:03

## 2022-01-01 RX ADMIN — AMLODIPINE BESYLATE 5 MG: 5 TABLET ORAL at 08:42

## 2022-01-01 RX ADMIN — Medication 5 MG/HR: at 23:56

## 2022-01-01 RX ADMIN — ARFORMOTEROL TARTRATE 15 MCG: 15 SOLUTION RESPIRATORY (INHALATION) at 06:12

## 2022-01-01 RX ADMIN — CYANOCOBALAMIN TAB 500 MCG 1000 MCG: 500 TAB at 09:12

## 2022-01-01 RX ADMIN — DONEPEZIL HYDROCHLORIDE 5 MG: 5 TABLET ORAL at 20:00

## 2022-01-01 RX ADMIN — MAGNESIUM SULFATE HEPTAHYDRATE 2 G: 40 INJECTION, SOLUTION INTRAVENOUS at 15:30

## 2022-01-01 RX ADMIN — Medication 10 ML: at 21:05

## 2022-01-01 RX ADMIN — Medication 10 ML: at 08:10

## 2022-01-01 RX ADMIN — POTASSIUM & SODIUM PHOSPHATES POWDER PACK 280-160-250 MG 2 PACKET: 280-160-250 PACK at 17:18

## 2022-01-01 RX ADMIN — SODIUM CHLORIDE 500 ML: 9 INJECTION, SOLUTION INTRAVENOUS at 13:51

## 2022-01-01 RX ADMIN — POTASSIUM & SODIUM PHOSPHATES POWDER PACK 280-160-250 MG 2 PACKET: 280-160-250 PACK at 21:19

## 2022-01-01 RX ADMIN — BUDESONIDE 0.5 MG: 0.5 SUSPENSION RESPIRATORY (INHALATION) at 08:03

## 2022-01-01 RX ADMIN — PANTOPRAZOLE SODIUM 40 MG: 40 INJECTION, POWDER, FOR SOLUTION INTRAVENOUS at 05:14

## 2022-01-01 RX ADMIN — BUDESONIDE 0.5 MG: 0.5 SUSPENSION RESPIRATORY (INHALATION) at 09:00

## 2022-01-01 RX ADMIN — BUDESONIDE 0.5 MG: 0.5 SUSPENSION RESPIRATORY (INHALATION) at 06:28

## 2022-01-01 RX ADMIN — METOPROLOL TARTRATE 2.5 MG: 5 INJECTION INTRAVENOUS at 18:14

## 2022-01-01 RX ADMIN — Medication 5 MG: at 21:34

## 2022-01-01 RX ADMIN — POTASSIUM PHOSPHATE, MONOBASIC AND POTASSIUM PHOSPHATE, DIBASIC 15 MMOL: 224; 236 INJECTION, SOLUTION, CONCENTRATE INTRAVENOUS at 13:48

## 2022-01-01 RX ADMIN — SODIUM CHLORIDE, POTASSIUM CHLORIDE, SODIUM LACTATE AND CALCIUM CHLORIDE 75 ML/HR: 600; 310; 30; 20 INJECTION, SOLUTION INTRAVENOUS at 17:42

## 2022-01-01 RX ADMIN — ARFORMOTEROL TARTRATE 15 MCG: 15 SOLUTION RESPIRATORY (INHALATION) at 22:33

## 2022-01-01 RX ADMIN — POTASSIUM & SODIUM PHOSPHATES POWDER PACK 280-160-250 MG 2 PACKET: 280-160-250 PACK at 10:17

## 2022-01-01 RX ADMIN — FOLIC ACID 1 MG: 1 TABLET ORAL at 08:10

## 2022-01-01 RX ADMIN — FLUOXETINE HYDROCHLORIDE 10 MG: 10 CAPSULE ORAL at 09:05

## 2022-01-01 RX ADMIN — FOLIC ACID 1 MG: 1 TABLET ORAL at 21:05

## 2022-01-01 RX ADMIN — CYANOCOBALAMIN TAB 500 MCG 1000 MCG: 500 TAB at 08:42

## 2022-01-01 RX ADMIN — POTASSIUM & SODIUM PHOSPHATES POWDER PACK 280-160-250 MG 2 PACKET: 280-160-250 PACK at 21:05

## 2022-01-01 RX ADMIN — PANTOPRAZOLE SODIUM 40 MG: 40 INJECTION, POWDER, FOR SOLUTION INTRAVENOUS at 06:05

## 2022-01-01 RX ADMIN — MIRTAZAPINE 15 MG: 15 TABLET, FILM COATED ORAL at 21:05

## 2022-01-01 RX ADMIN — POTASSIUM PHOSPHATE, MONOBASIC AND POTASSIUM PHOSPHATE, DIBASIC 15 MMOL: 224; 236 INJECTION, SOLUTION, CONCENTRATE INTRAVENOUS at 11:55

## 2022-01-01 RX ADMIN — FOLIC ACID 1 MG: 1 TABLET ORAL at 08:09

## 2022-01-01 RX ADMIN — IPRATROPIUM BROMIDE AND ALBUTEROL SULFATE 3 ML: .5; 3 SOLUTION RESPIRATORY (INHALATION) at 17:58

## 2022-01-01 RX ADMIN — PANTOPRAZOLE SODIUM 40 MG: 40 INJECTION, POWDER, FOR SOLUTION INTRAVENOUS at 05:28

## 2022-01-01 RX ADMIN — APIXABAN 2.5 MG: 2.5 TABLET, FILM COATED ORAL at 09:24

## 2022-01-01 RX ADMIN — AMLODIPINE BESYLATE 5 MG: 5 TABLET ORAL at 09:30

## 2022-01-01 RX ADMIN — Medication 10 ML: at 20:11

## 2022-01-01 RX ADMIN — ASPIRIN 300 MG: 300 SUPPOSITORY RECTAL at 19:56

## 2022-01-01 RX ADMIN — MAGNESIUM OXIDE TAB 400 MG (241.3 MG ELEMENTAL MG) 400 MG: 400 (241.3 MG) TAB at 12:06

## 2022-01-01 RX ADMIN — APIXABAN 2.5 MG: 2.5 TABLET, FILM COATED ORAL at 20:49

## 2022-01-01 RX ADMIN — BUDESONIDE 0.5 MG: 0.5 SUSPENSION RESPIRATORY (INHALATION) at 07:00

## 2022-01-01 RX ADMIN — FOLIC ACID 1 MG: 1 TABLET ORAL at 08:45

## 2022-01-01 RX ADMIN — POTASSIUM PHOSPHATE, MONOBASIC AND POTASSIUM PHOSPHATE, DIBASIC 15 MMOL: 224; 236 INJECTION, SOLUTION, CONCENTRATE INTRAVENOUS at 11:20

## 2022-01-01 RX ADMIN — FERROUS SULFATE TAB 325 MG (65 MG ELEMENTAL FE) 325 MG: 325 (65 FE) TAB at 09:18

## 2022-01-01 RX ADMIN — BUDESONIDE 0.5 MG: 0.5 SUSPENSION RESPIRATORY (INHALATION) at 18:43

## 2022-01-01 RX ADMIN — FLUOXETINE HYDROCHLORIDE 10 MG: 10 CAPSULE ORAL at 09:30

## 2022-01-01 RX ADMIN — CYANOCOBALAMIN TAB 500 MCG 1000 MCG: 500 TAB at 09:30

## 2022-01-01 RX ADMIN — LEVOFLOXACIN 750 MG: 750 TABLET, FILM COATED ORAL at 09:47

## 2022-01-01 RX ADMIN — FLUOXETINE HYDROCHLORIDE 10 MG: 10 CAPSULE ORAL at 11:32

## 2022-01-01 RX ADMIN — ARFORMOTEROL TARTRATE 15 MCG: 15 SOLUTION RESPIRATORY (INHALATION) at 20:10

## 2022-01-01 RX ADMIN — FOLIC ACID 1 MG: 1 TABLET ORAL at 21:49

## 2022-01-01 RX ADMIN — POTASSIUM CHLORIDE 20 MEQ: 750 CAPSULE, EXTENDED RELEASE ORAL at 18:21

## 2022-01-01 RX ADMIN — BUDESONIDE 0.5 MG: 0.5 SUSPENSION RESPIRATORY (INHALATION) at 22:33

## 2022-01-01 RX ADMIN — FLUOXETINE HYDROCHLORIDE 10 MG: 10 CAPSULE ORAL at 09:11

## 2022-01-01 RX ADMIN — BUDESONIDE 0.5 MG: 0.5 SUSPENSION RESPIRATORY (INHALATION) at 18:41

## 2022-01-01 RX ADMIN — FERROUS SULFATE TAB 325 MG (65 MG ELEMENTAL FE) 325 MG: 325 (65 FE) TAB at 09:00

## 2022-01-01 RX ADMIN — MIRTAZAPINE 15 MG: 15 TABLET, FILM COATED ORAL at 21:18

## 2022-01-01 RX ADMIN — FOLIC ACID 1 MG: 1 TABLET ORAL at 20:47

## 2022-01-01 RX ADMIN — BUDESONIDE 0.5 MG: 0.5 SUSPENSION RESPIRATORY (INHALATION) at 21:48

## 2022-01-01 RX ADMIN — HALOPERIDOL LACTATE 2 MG: 5 INJECTION, SOLUTION INTRAMUSCULAR at 03:20

## 2022-01-01 RX ADMIN — SODIUM CHLORIDE, POTASSIUM CHLORIDE, SODIUM LACTATE AND CALCIUM CHLORIDE 75 ML/HR: 600; 310; 30; 20 INJECTION, SOLUTION INTRAVENOUS at 22:13

## 2022-01-01 RX ADMIN — POTASSIUM & SODIUM PHOSPHATES POWDER PACK 280-160-250 MG 2 PACKET: 280-160-250 PACK at 23:47

## 2022-01-01 RX ADMIN — MEROPENEM 1 G: 1 INJECTION, POWDER, FOR SOLUTION INTRAVENOUS at 17:45

## 2022-01-01 RX ADMIN — METOPROLOL TARTRATE 12.5 MG: 25 TABLET, FILM COATED ORAL at 18:58

## 2022-01-01 RX ADMIN — DONEPEZIL HYDROCHLORIDE 5 MG: 5 TABLET ORAL at 20:31

## 2022-01-01 RX ADMIN — ATORVASTATIN CALCIUM 80 MG: 40 TABLET, FILM COATED ORAL at 20:15

## 2022-01-01 RX ADMIN — MIRTAZAPINE 15 MG: 15 TABLET, FILM COATED ORAL at 22:23

## 2022-01-01 RX ADMIN — DONEPEZIL HYDROCHLORIDE 5 MG: 5 TABLET ORAL at 20:12

## 2022-01-01 RX ADMIN — DONEPEZIL HYDROCHLORIDE 5 MG: 5 TABLET ORAL at 20:10

## 2022-01-01 RX ADMIN — SODIUM CHLORIDE 100 ML/HR: 9 INJECTION, SOLUTION INTRAVENOUS at 12:47

## 2022-01-01 RX ADMIN — FOLIC ACID 1 MG: 1 TABLET ORAL at 09:23

## 2022-01-01 RX ADMIN — SODIUM CHLORIDE, POTASSIUM CHLORIDE, SODIUM LACTATE AND CALCIUM CHLORIDE 75 ML/HR: 600; 310; 30; 20 INJECTION, SOLUTION INTRAVENOUS at 20:12

## 2022-01-01 RX ADMIN — BUDESONIDE 0.5 MG: 0.5 SUSPENSION RESPIRATORY (INHALATION) at 18:27

## 2022-01-01 RX ADMIN — MIRTAZAPINE 15 MG: 15 TABLET, FILM COATED ORAL at 21:55

## 2022-01-01 RX ADMIN — VANCOMYCIN HYDROCHLORIDE 1250 MG: 5 INJECTION, POWDER, LYOPHILIZED, FOR SOLUTION INTRAVENOUS at 16:21

## 2022-01-01 RX ADMIN — BUDESONIDE 0.5 MG: 0.5 SUSPENSION RESPIRATORY (INHALATION) at 06:12

## 2022-01-01 RX ADMIN — MEROPENEM 1 G: 1 INJECTION, POWDER, FOR SOLUTION INTRAVENOUS at 04:58

## 2022-01-01 RX ADMIN — Medication 10 ML: at 21:24

## 2022-01-01 RX ADMIN — FUROSEMIDE 40 MG: 10 INJECTION, SOLUTION INTRAMUSCULAR; INTRAVENOUS at 18:05

## 2022-01-01 RX ADMIN — VANCOMYCIN HYDROCHLORIDE 1000 MG: 5 INJECTION, POWDER, LYOPHILIZED, FOR SOLUTION INTRAVENOUS at 15:39

## 2022-01-01 RX ADMIN — PANTOPRAZOLE SODIUM 40 MG: 40 INJECTION, POWDER, FOR SOLUTION INTRAVENOUS at 13:58

## 2022-01-01 RX ADMIN — Medication 10 ML: at 09:44

## 2022-01-01 RX ADMIN — BUDESONIDE 0.5 MG: 0.5 SUSPENSION RESPIRATORY (INHALATION) at 18:33

## 2022-01-01 RX ADMIN — Medication 10 ML: at 21:33

## 2022-01-01 RX ADMIN — APIXABAN 2.5 MG: 2.5 TABLET, FILM COATED ORAL at 10:11

## 2022-01-01 RX ADMIN — ARFORMOTEROL TARTRATE 15 MCG: 15 SOLUTION RESPIRATORY (INHALATION) at 20:00

## 2022-01-01 RX ADMIN — MIRTAZAPINE 15 MG: 15 TABLET, FILM COATED ORAL at 20:00

## 2022-01-01 RX ADMIN — MIRTAZAPINE 15 MG: 15 TABLET, FILM COATED ORAL at 20:03

## 2022-01-01 RX ADMIN — ALBUTEROL SULFATE 2.5 MG: 2.5 SOLUTION RESPIRATORY (INHALATION) at 10:15

## 2022-01-01 RX ADMIN — DONEPEZIL HYDROCHLORIDE 5 MG: 5 TABLET ORAL at 20:15

## 2022-01-01 RX ADMIN — METOPROLOL TARTRATE 2.5 MG: 5 INJECTION INTRAVENOUS at 13:45

## 2022-01-01 RX ADMIN — FLUOXETINE HYDROCHLORIDE 10 MG: 10 CAPSULE ORAL at 08:42

## 2022-01-01 RX ADMIN — BUDESONIDE 0.5 MG: 0.5 SUSPENSION RESPIRATORY (INHALATION) at 21:45

## 2022-01-01 RX ADMIN — Medication 10 ML: at 08:09

## 2022-01-01 RX ADMIN — FOLIC ACID 1 MG: 1 TABLET ORAL at 22:23

## 2022-01-01 RX ADMIN — MEROPENEM 1 G: 1 INJECTION, POWDER, FOR SOLUTION INTRAVENOUS at 04:50

## 2022-01-01 RX ADMIN — DONEPEZIL HYDROCHLORIDE 5 MG: 5 TABLET ORAL at 21:16

## 2022-01-01 RX ADMIN — DONEPEZIL HYDROCHLORIDE 5 MG: 5 TABLET ORAL at 21:49

## 2022-01-01 RX ADMIN — Medication 10 ML: at 08:16

## 2022-01-01 RX ADMIN — DONEPEZIL HYDROCHLORIDE 5 MG: 5 TABLET ORAL at 21:05

## 2022-01-01 RX ADMIN — MORPHINE SULFATE 2 MG: 2 INJECTION, SOLUTION INTRAMUSCULAR; INTRAVENOUS at 10:32

## 2022-01-01 RX ADMIN — DONEPEZIL HYDROCHLORIDE 5 MG: 5 TABLET ORAL at 20:49

## 2022-01-01 RX ADMIN — FOLIC ACID 1 MG: 1 TABLET ORAL at 20:00

## 2022-01-01 RX ADMIN — POTASSIUM & SODIUM PHOSPHATES POWDER PACK 280-160-250 MG 2 PACKET: 280-160-250 PACK at 17:33

## 2022-01-01 RX ADMIN — MONTELUKAST 10 MG: 10 TABLET, FILM COATED ORAL at 09:30

## 2022-01-01 RX ADMIN — METOPROLOL TARTRATE 12.5 MG: 25 TABLET, FILM COATED ORAL at 20:03

## 2022-01-01 RX ADMIN — BUDESONIDE 0.5 MG: 0.5 SUSPENSION RESPIRATORY (INHALATION) at 19:21

## 2022-01-01 RX ADMIN — BUDESONIDE 0.5 MG: 0.5 SUSPENSION RESPIRATORY (INHALATION) at 18:09

## 2022-01-01 RX ADMIN — SODIUM CHLORIDE, POTASSIUM CHLORIDE, SODIUM LACTATE AND CALCIUM CHLORIDE 1000 ML: 600; 310; 30; 20 INJECTION, SOLUTION INTRAVENOUS at 13:02

## 2022-01-01 RX ADMIN — DONEPEZIL HYDROCHLORIDE 5 MG: 5 TABLET ORAL at 20:20

## 2022-01-01 RX ADMIN — BUDESONIDE 0.5 MG: 0.5 SUSPENSION RESPIRATORY (INHALATION) at 19:33

## 2022-01-01 RX ADMIN — FLUOXETINE HYDROCHLORIDE 10 MG: 10 CAPSULE ORAL at 09:24

## 2022-01-01 RX ADMIN — MEROPENEM 1 G: 1 INJECTION, POWDER, FOR SOLUTION INTRAVENOUS at 16:43

## 2022-01-01 RX ADMIN — APIXABAN 2.5 MG: 2.5 TABLET, FILM COATED ORAL at 08:42

## 2022-01-01 RX ADMIN — APIXABAN 2.5 MG: 2.5 TABLET, FILM COATED ORAL at 20:15

## 2022-01-01 RX ADMIN — BUDESONIDE 0.5 MG: 0.5 SUSPENSION RESPIRATORY (INHALATION) at 06:25

## 2022-01-01 RX ADMIN — APIXABAN 2.5 MG: 2.5 TABLET, FILM COATED ORAL at 20:03

## 2022-01-01 RX ADMIN — Medication 10 ML: at 20:00

## 2022-01-01 RX ADMIN — Medication 10 ML: at 08:06

## 2022-01-01 RX ADMIN — MIRTAZAPINE 15 MG: 15 TABLET, FILM COATED ORAL at 23:04

## 2022-01-01 RX ADMIN — FLUOXETINE HYDROCHLORIDE 10 MG: 10 CAPSULE ORAL at 08:06

## 2022-01-01 RX ADMIN — PANTOPRAZOLE SODIUM 40 MG: 40 INJECTION, POWDER, FOR SOLUTION INTRAVENOUS at 06:01

## 2022-01-01 RX ADMIN — Medication 10 ML: at 20:30

## 2022-01-01 RX ADMIN — BUDESONIDE 0.5 MG: 0.5 SUSPENSION RESPIRATORY (INHALATION) at 07:31

## 2022-01-01 RX ADMIN — MONTELUKAST 10 MG: 10 TABLET, FILM COATED ORAL at 08:16

## 2022-01-01 RX ADMIN — MAGNESIUM SULFATE HEPTAHYDRATE 2 G: 40 INJECTION, SOLUTION INTRAVENOUS at 08:16

## 2022-01-01 RX ADMIN — APIXABAN 2.5 MG: 2.5 TABLET, FILM COATED ORAL at 09:31

## 2022-01-01 RX ADMIN — FOLIC ACID 1 MG: 1 TABLET ORAL at 20:10

## 2022-01-01 RX ADMIN — MIRTAZAPINE 15 MG: 15 TABLET, FILM COATED ORAL at 20:10

## 2022-01-01 RX ADMIN — MONTELUKAST 10 MG: 10 TABLET, FILM COATED ORAL at 08:42

## 2022-01-01 RX ADMIN — Medication 10 ML: at 21:49

## 2022-01-01 RX ADMIN — MIRTAZAPINE 15 MG: 15 TABLET, FILM COATED ORAL at 21:49

## 2022-01-01 RX ADMIN — POTASSIUM CHLORIDE AND SODIUM CHLORIDE 100 ML/HR: 900; 150 INJECTION, SOLUTION INTRAVENOUS at 12:06

## 2022-01-01 RX ADMIN — POTASSIUM & SODIUM PHOSPHATES POWDER PACK 280-160-250 MG 2 PACKET: 280-160-250 PACK at 21:32

## 2022-01-01 RX ADMIN — POTASSIUM PHOSPHATE, MONOBASIC AND POTASSIUM PHOSPHATE, DIBASIC 15 MMOL: 224; 236 INJECTION, SOLUTION, CONCENTRATE INTRAVENOUS at 09:21

## 2022-01-01 RX ADMIN — VANCOMYCIN HYDROCHLORIDE 750 MG: 5 INJECTION, POWDER, LYOPHILIZED, FOR SOLUTION INTRAVENOUS at 13:58

## 2022-01-01 RX ADMIN — FOLIC ACID 1 MG: 1 TABLET ORAL at 09:05

## 2022-01-01 RX ADMIN — DEXTROSE MONOHYDRATE 75 ML/HR: 50 INJECTION, SOLUTION INTRAVENOUS at 11:21

## 2022-01-01 RX ADMIN — Medication 10 ML: at 09:22

## 2022-01-01 RX ADMIN — FUROSEMIDE 20 MG: 10 INJECTION, SOLUTION INTRAMUSCULAR; INTRAVENOUS at 20:22

## 2022-01-01 RX ADMIN — FOLIC ACID 1 MG: 1 TABLET ORAL at 09:14

## 2022-01-01 RX ADMIN — Medication 10 ML: at 22:24

## 2022-01-01 RX ADMIN — Medication 10 ML: at 21:16

## 2022-01-01 RX ADMIN — BUDESONIDE 0.5 MG: 0.5 SUSPENSION RESPIRATORY (INHALATION) at 18:37

## 2022-01-01 RX ADMIN — FOLIC ACID 1 MG: 1 TABLET ORAL at 10:11

## 2022-01-01 RX ADMIN — ATORVASTATIN CALCIUM 80 MG: 40 TABLET, FILM COATED ORAL at 20:31

## 2022-01-01 RX ADMIN — FERROUS SULFATE TAB 325 MG (65 MG ELEMENTAL FE) 325 MG: 325 (65 FE) TAB at 08:31

## 2022-01-01 RX ADMIN — BUDESONIDE 0.5 MG: 0.5 SUSPENSION RESPIRATORY (INHALATION) at 06:10

## 2022-01-01 RX ADMIN — LEVOFLOXACIN 750 MG: 750 TABLET, FILM COATED ORAL at 08:45

## 2022-01-01 RX ADMIN — FLUOXETINE HYDROCHLORIDE 10 MG: 10 CAPSULE ORAL at 08:16

## 2022-01-01 RX ADMIN — METOPROLOL TARTRATE 12.5 MG: 25 TABLET, FILM COATED ORAL at 14:20

## 2022-01-01 RX ADMIN — Medication 10 ML: at 08:42

## 2022-01-01 RX ADMIN — MONTELUKAST 10 MG: 10 TABLET, FILM COATED ORAL at 09:12

## 2022-01-01 RX ADMIN — ARFORMOTEROL TARTRATE 15 MCG: 15 SOLUTION RESPIRATORY (INHALATION) at 22:23

## 2022-01-01 RX ADMIN — APIXABAN 2.5 MG: 2.5 TABLET, FILM COATED ORAL at 20:30

## 2022-01-01 RX ADMIN — APIXABAN 2.5 MG: 2.5 TABLET, FILM COATED ORAL at 21:05

## 2022-01-01 RX ADMIN — FOLIC ACID 1 MG: 1 TABLET ORAL at 21:34

## 2022-01-01 RX ADMIN — VANCOMYCIN HYDROCHLORIDE 750 MG: 5 INJECTION, POWDER, LYOPHILIZED, FOR SOLUTION INTRAVENOUS at 08:09

## 2022-01-01 RX ADMIN — IPRATROPIUM BROMIDE AND ALBUTEROL SULFATE 3 ML: 2.5; .5 SOLUTION RESPIRATORY (INHALATION) at 07:31

## 2022-01-01 RX ADMIN — POTASSIUM PHOSPHATE, MONOBASIC AND POTASSIUM PHOSPHATE, DIBASIC 15 MMOL: 224; 236 INJECTION, SOLUTION, CONCENTRATE INTRAVENOUS at 08:10

## 2022-01-01 RX ADMIN — Medication 10 ML: at 09:24

## 2022-01-01 RX ADMIN — Medication 10 ML: at 09:31

## 2022-01-01 RX ADMIN — POTASSIUM CHLORIDE 10 MEQ: 7.46 INJECTION, SOLUTION INTRAVENOUS at 15:45

## 2022-01-01 RX ADMIN — APIXABAN 2.5 MG: 2.5 TABLET, FILM COATED ORAL at 09:43

## 2022-01-01 RX ADMIN — BUDESONIDE 0.5 MG: 0.5 SUSPENSION RESPIRATORY (INHALATION) at 06:29

## 2022-01-01 RX ADMIN — DONEPEZIL HYDROCHLORIDE 5 MG: 5 TABLET ORAL at 23:04

## 2022-01-01 RX ADMIN — ATORVASTATIN CALCIUM 80 MG: 40 TABLET, FILM COATED ORAL at 20:30

## 2022-01-01 RX ADMIN — FOLIC ACID 1 MG: 1 TABLET ORAL at 09:47

## 2022-01-01 RX ADMIN — FOLIC ACID 1 MG: 1 TABLET ORAL at 20:20

## 2022-01-01 RX ADMIN — MIRTAZAPINE 15 MG: 15 TABLET, FILM COATED ORAL at 21:32

## 2022-01-01 RX ADMIN — METOPROLOL TARTRATE 12.5 MG: 25 TABLET, FILM COATED ORAL at 21:55

## 2022-01-01 RX ADMIN — SODIUM CHLORIDE, POTASSIUM CHLORIDE, SODIUM LACTATE AND CALCIUM CHLORIDE 75 ML/HR: 600; 310; 30; 20 INJECTION, SOLUTION INTRAVENOUS at 09:23

## 2022-01-01 RX ADMIN — APIXABAN 2.5 MG: 2.5 TABLET, FILM COATED ORAL at 08:31

## 2022-01-01 RX ADMIN — APIXABAN 2.5 MG: 2.5 TABLET, FILM COATED ORAL at 08:16

## 2022-01-01 RX ADMIN — VANCOMYCIN HYDROCHLORIDE 750 MG: 5 INJECTION, POWDER, LYOPHILIZED, FOR SOLUTION INTRAVENOUS at 09:48

## 2022-01-01 RX ADMIN — FOLIC ACID 1 MG: 1 TABLET ORAL at 09:44

## 2022-01-01 RX ADMIN — ARFORMOTEROL TARTRATE 15 MCG: 15 SOLUTION RESPIRATORY (INHALATION) at 21:45

## 2022-01-01 RX ADMIN — LEVOFLOXACIN 750 MG: 750 INJECTION, SOLUTION INTRAVENOUS at 15:12

## 2022-01-01 RX ADMIN — POTASSIUM CHLORIDE AND SODIUM CHLORIDE 100 ML/HR: 900; 150 INJECTION, SOLUTION INTRAVENOUS at 20:00

## 2022-01-01 RX ADMIN — APIXABAN 2.5 MG: 2.5 TABLET, FILM COATED ORAL at 09:05

## 2022-01-01 RX ADMIN — Medication 10 ML: at 11:33

## 2022-01-01 RX ADMIN — DONEPEZIL HYDROCHLORIDE 5 MG: 5 TABLET ORAL at 20:30

## 2022-01-01 RX ADMIN — BUDESONIDE 0.5 MG: 0.5 SUSPENSION RESPIRATORY (INHALATION) at 20:10

## 2022-01-01 RX ADMIN — METOPROLOL TARTRATE 2.5 MG: 5 INJECTION INTRAVENOUS at 22:34

## 2022-01-01 RX ADMIN — AMLODIPINE BESYLATE 5 MG: 5 TABLET ORAL at 09:24

## 2022-01-01 RX ADMIN — SODIUM CHLORIDE, POTASSIUM CHLORIDE, SODIUM LACTATE AND CALCIUM CHLORIDE 75 ML/HR: 600; 310; 30; 20 INJECTION, SOLUTION INTRAVENOUS at 02:03

## 2022-01-01 RX ADMIN — ATORVASTATIN CALCIUM 80 MG: 40 TABLET, FILM COATED ORAL at 21:16

## 2022-01-01 RX ADMIN — MONTELUKAST 10 MG: 10 TABLET, FILM COATED ORAL at 09:19

## 2022-01-01 RX ADMIN — AMLODIPINE BESYLATE 5 MG: 5 TABLET ORAL at 09:05

## 2022-01-01 RX ADMIN — APIXABAN 2.5 MG: 2.5 TABLET, FILM COATED ORAL at 09:18

## 2022-01-01 RX ADMIN — SODIUM CHLORIDE 500 ML: 9 INJECTION, SOLUTION INTRAVENOUS at 15:33

## 2022-01-01 RX ADMIN — FLUOXETINE HYDROCHLORIDE 10 MG: 10 CAPSULE ORAL at 09:47

## 2022-01-01 RX ADMIN — Medication 10 ML: at 20:31

## 2022-01-01 RX ADMIN — ACETAMINOPHEN 650 MG: 325 TABLET ORAL at 17:19

## 2022-01-01 RX ADMIN — Medication 10 ML: at 08:45

## 2022-01-01 RX ADMIN — FOLIC ACID 1 MG: 1 TABLET ORAL at 09:24

## 2022-01-01 RX ADMIN — APIXABAN 2.5 MG: 2.5 TABLET, FILM COATED ORAL at 20:12

## 2022-01-01 RX ADMIN — APIXABAN 2.5 MG: 2.5 TABLET, FILM COATED ORAL at 09:12

## 2022-01-01 RX ADMIN — FLUOXETINE HYDROCHLORIDE 10 MG: 10 CAPSULE ORAL at 09:46

## 2022-01-01 RX ADMIN — METOPROLOL TARTRATE 2.5 MG: 5 INJECTION INTRAVENOUS at 09:28

## 2022-01-01 RX ADMIN — IPRATROPIUM BROMIDE AND ALBUTEROL SULFATE 3 ML: 2.5; .5 SOLUTION RESPIRATORY (INHALATION) at 08:55

## 2022-01-01 RX ADMIN — Medication 10 ML: at 20:12

## 2022-01-01 RX ADMIN — BUDESONIDE 0.5 MG: 0.5 SUSPENSION RESPIRATORY (INHALATION) at 06:16

## 2022-01-01 RX ADMIN — METOPROLOL TARTRATE 2.5 MG: 5 INJECTION INTRAVENOUS at 12:47

## 2022-01-01 RX ADMIN — SODIUM CHLORIDE, POTASSIUM CHLORIDE, SODIUM LACTATE AND CALCIUM CHLORIDE 75 ML/HR: 600; 310; 30; 20 INJECTION, SOLUTION INTRAVENOUS at 12:53

## 2022-01-01 RX ADMIN — Medication 10 ML: at 10:14

## 2022-01-01 RX ADMIN — BUDESONIDE 0.5 MG: 0.5 SUSPENSION RESPIRATORY (INHALATION) at 22:23

## 2022-01-01 RX ADMIN — METOPROLOL TARTRATE 2.5 MG: 5 INJECTION INTRAVENOUS at 23:55

## 2022-01-01 RX ADMIN — METOPROLOL TARTRATE 2.5 MG: 5 INJECTION INTRAVENOUS at 06:40

## 2022-01-01 RX ADMIN — Medication 10 ML: at 09:12

## 2022-01-01 RX ADMIN — MAGNESIUM SULFATE HEPTAHYDRATE 1 G: 10 INJECTION, SOLUTION INTRAVENOUS at 15:44

## 2022-01-01 RX ADMIN — SODIUM CHLORIDE 100 ML/HR: 9 INJECTION, SOLUTION INTRAVENOUS at 00:00

## 2022-01-01 RX ADMIN — CYANOCOBALAMIN TAB 500 MCG 1000 MCG: 500 TAB at 08:16

## 2022-01-01 RX ADMIN — METOPROLOL TARTRATE 2.5 MG: 5 INJECTION INTRAVENOUS at 19:44

## 2022-01-01 RX ADMIN — METOPROLOL TARTRATE 2.5 MG: 5 INJECTION INTRAVENOUS at 18:21

## 2022-01-01 RX ADMIN — BUDESONIDE 0.5 MG: 0.5 SUSPENSION RESPIRATORY (INHALATION) at 19:20

## 2022-01-01 RX ADMIN — Medication 10 ML: at 09:14

## 2022-01-01 RX ADMIN — AMLODIPINE BESYLATE 5 MG: 5 TABLET ORAL at 10:11

## 2022-01-01 RX ADMIN — PANTOPRAZOLE SODIUM 40 MG: 40 INJECTION, POWDER, FOR SOLUTION INTRAVENOUS at 05:26

## 2022-01-01 RX ADMIN — Medication 10 ML: at 20:25

## 2022-01-01 RX ADMIN — MIRTAZAPINE 15 MG: 15 TABLET, FILM COATED ORAL at 20:47

## 2022-01-01 RX ADMIN — ATORVASTATIN CALCIUM 80 MG: 40 TABLET, FILM COATED ORAL at 20:49

## 2022-01-01 RX ADMIN — ARFORMOTEROL TARTRATE 15 MCG: 15 SOLUTION RESPIRATORY (INHALATION) at 06:40

## 2022-01-01 RX ADMIN — MONTELUKAST 10 MG: 10 TABLET, FILM COATED ORAL at 08:31

## 2022-01-01 RX ADMIN — FLUOXETINE HYDROCHLORIDE 10 MG: 10 CAPSULE ORAL at 09:44

## 2022-01-01 RX ADMIN — PANTOPRAZOLE SODIUM 40 MG: 40 INJECTION, POWDER, FOR SOLUTION INTRAVENOUS at 06:10

## 2022-01-01 RX ADMIN — DONEPEZIL HYDROCHLORIDE 5 MG: 5 TABLET ORAL at 20:46

## 2022-01-01 RX ADMIN — POTASSIUM & SODIUM PHOSPHATES POWDER PACK 280-160-250 MG 2 PACKET: 280-160-250 PACK at 12:22

## 2022-01-01 RX ADMIN — FLUOXETINE HYDROCHLORIDE 10 MG: 10 CAPSULE ORAL at 08:45

## 2022-01-01 RX ADMIN — DONEPEZIL HYDROCHLORIDE 5 MG: 5 TABLET ORAL at 22:23

## 2022-01-01 RX ADMIN — ARFORMOTEROL TARTRATE 15 MCG: 15 SOLUTION RESPIRATORY (INHALATION) at 07:47

## 2022-01-01 RX ADMIN — BUDESONIDE 0.5 MG: 0.5 SUSPENSION RESPIRATORY (INHALATION) at 06:31

## 2022-01-01 RX ADMIN — Medication 10 ML: at 20:48

## 2022-01-01 RX ADMIN — FERROUS SULFATE TAB 325 MG (65 MG ELEMENTAL FE) 325 MG: 325 (65 FE) TAB at 08:16

## 2022-01-01 RX ADMIN — Medication 10 ML: at 08:22

## 2022-01-01 RX ADMIN — BUDESONIDE 0.5 MG: 0.5 SUSPENSION RESPIRATORY (INHALATION) at 08:10

## 2022-01-01 RX ADMIN — FOLIC ACID 1 MG: 1 TABLET ORAL at 08:06

## 2022-01-01 RX ADMIN — FLUOXETINE HYDROCHLORIDE 10 MG: 10 CAPSULE ORAL at 09:23

## 2022-01-01 RX ADMIN — POTASSIUM & SODIUM PHOSPHATES POWDER PACK 280-160-250 MG 2 PACKET: 280-160-250 PACK at 11:44

## 2022-01-01 RX ADMIN — MAGNESIUM SULFATE HEPTAHYDRATE 2 G: 40 INJECTION, SOLUTION INTRAVENOUS at 12:06

## 2022-01-01 RX ADMIN — AMLODIPINE BESYLATE 5 MG: 5 TABLET ORAL at 09:12

## 2022-01-01 RX ADMIN — POTASSIUM PHOSPHATE, MONOBASIC AND POTASSIUM PHOSPHATE, DIBASIC 15 MMOL: 224; 236 INJECTION, SOLUTION, CONCENTRATE INTRAVENOUS at 11:19

## 2022-01-01 RX ADMIN — POTASSIUM CHLORIDE 10 MEQ: 7.46 INJECTION, SOLUTION INTRAVENOUS at 13:49

## 2022-01-14 PROBLEM — E78.2 MIXED HYPERLIPIDEMIA: Status: ACTIVE | Noted: 2022-01-01

## 2022-01-14 PROBLEM — I25.10 CORONARY ARTERY DISEASE INVOLVING NATIVE CORONARY ARTERY OF NATIVE HEART WITHOUT ANGINA PECTORIS: Status: ACTIVE | Noted: 2022-01-01

## 2022-01-14 PROBLEM — I48.0 PAROXYSMAL ATRIAL FIBRILLATION (HCC): Status: ACTIVE | Noted: 2022-01-01

## 2022-01-14 NOTE — PROGRESS NOTES
CARDIOLOGY FOLLOW-UP PROGRESS NOTE        Chief Complaint  Atrial Fibrillation (Follow-up) and Hypertension    Subjective            Stacey Juarez presents to [facility]  History of Present Illness    Ms. Juarez is here for routine 6-month follow-up visit.  She denies having any palpitations, dizziness or syncopal episodes.  She reports feeling weak and tired.  She has shortness of breath on mild-to-moderate exertion and currently is on oxygen 2 L by nasal cannula 24/7.  Denies any chest pain or tightness.       Past History:    1. Paroxysmal atrial fibrillation, 2. myocardial infarction, hypertension, hyperlipidemia, COPD and nicotine dependence.     Medical History:  Past Medical History:   Diagnosis Date   • AAA (abdominal aortic aneurysm) ([facility])    • Acute mesenteric ischemia ([facility]) 03/10/2015   • Arthritis    • Benign essential hypertension    • Bladder disorder    • Bladder problem    • Cataract    • Colon disorder    • COPD (chronic obstructive pulmonary disease) ([facility])    • Dementia ([facility])    • Diverticulitis    • Essential hypertension 05/10/2017   • Heart attack ([facility]) 1986   • Hemorrhoids    • Limb swelling    • Mixed hyperlipidemia    • Myocardial infarction ([facility]) 1986   • Osteoporosis 06/04/2019   • Oxygen dependent 03/24/2020   • Paroxysmal A-fib ([facility]) 08/02/2017   • Respiratory failure ([facility]) 12/17/2020   • Tuberculosis     OR POSITIVE PPD TEST   • Vitamin D deficiency 05/08/2015       Surgical History: has a past surgical history that includes Back surgery; Bladder surgery; Breast surgery (1979); Carotid endarterectomy; Colonoscopy; Eye surgery; Hysterectomy (1971); Other surgical history; and Lumbar disc surgery (1986).     Family History: family history includes Arthritis in her mother; Heart disease in her father and mother.     Social History: reports that she quit smoking about 4 years ago. Her smoking use included cigarettes. She started smoking about 60 years ago. She  smoked 1.00 pack per day. She has never used smokeless tobacco. She reports that she does not drink alcohol and does not use drugs.    Allergies: Penicillins    Current Outpatient Medications on File Prior to Visit   Medication Sig   • albuterol sulfate  (90 Base) MCG/ACT inhaler INHALE 2 PUFFS BY MOUTH EVERY 4 TO 6 HOURS AS NEEDED FOR DIFFICULT BREATHING   • alendronate (FOSAMAX) 70 MG tablet TAKE 1 TABLET BY MOUTH 1 TIME A WEEK   • amLODIPine-benazepril (LOTREL 5-20) 5-20 MG per capsule TAKE ONE CAPSULE BY MOUTH EVERY DAY   • atorvastatin (LIPITOR) 40 MG tablet TAKE 1 TABLET BY MOUTH EVERY NIGHT AT BEDTIME   • budesonide (PULMICORT) 0.5 MG/2ML nebulizer solution Take 2 mL by nebulization 2 (two) times a day.   • donepezil (ARICEPT) 5 MG tablet Take 1 tablet by mouth Every Night.   • Eliquis 2.5 MG tablet tablet TAKE 1 TABLET BY MOUTH TWICE DAILY   • FLUoxetine (PROzac) 10 MG capsule TAKE 1 CAPSULE(10 MG) BY MOUTH EVERY DAY   • MAGnesium-Oxide 400 (241.3 Mg) MG tablet tablet TAKE 1 TABLET TWICE DAILY   • metoprolol succinate XL (TOPROL-XL) 25 MG 24 hr tablet TAKE 1/2 TABLET BY MOUTH EVERY DAY OR AS DIRECTED   • montelukast (SINGULAIR) 10 MG tablet TAKE 1 TABLET BY MOUTH AT BEDTIME   • potassium chloride (KAYCIEL) 20 MEQ/15ML (10%) solution TAKE 15 ML BY MOUTH TWICE DAILY   • spironolactone (ALDACTONE) 25 MG tablet TAKE 1 TABLET BY MOUTH EVERY DAY   • Stiolto Respimat 2.5-2.5 MCG/ACT aerosol solution inhaler INHALE 2 PUFFS EVERY DAY   • ipratropium-albuterol (DUO-NEB) 0.5-2.5 mg/3 ml nebulizer Take 3 mL by nebulization 4 (Four) Times a Day for 120 doses.     No current facility-administered medications on file prior to visit.          Review of Systems   Respiratory: Positive for shortness of breath. Negative for cough and wheezing.    Cardiovascular: Negative for chest pain, palpitations and leg swelling.   Gastrointestinal: Negative for nausea and vomiting.   Neurological: Negative for dizziness and  "syncope.        Objective     /55 (BP Location: Right arm)   Pulse 53   Ht 160 cm (63\")   Wt 67.1 kg (148 lb)   BMI 26.22 kg/m²       Physical Exam    General : Alert, awake, no acute distress  Neck : Supple, no carotid bruit, no jugular venous distention  CVS : Bradycardic, regular, no murmur, rubs or gallops  Lungs: Bilateral wheezing heard, no crackles   Abdomen: Soft, nontender, bowel sounds heard in all 4 quadrants  Extremities: Warm, well-perfused, no pedal edema    Result Review :     The following data was reviewed by: Waqas Ramírez MD on 01/11/2022:    CMP    CMP 4/23/21   Glucose 79   BUN 22   Creatinine 1.01 (A)   Sodium 139   Potassium 4.7   Chloride 103   Calcium 9.4   Albumin 4.1   Total Bilirubin 0.32   Alkaline Phosphatase 108   AST (SGOT) 20   ALT (SGPT) 7 (A)   (A) Abnormal value            CBC    CBC 4/23/21   WBC 3.87 (A)   RBC 4.41   Hemoglobin 12.2   Hematocrit 40.2   MCV 91.2   MCH 27.7   MCHC 30.3 (A)   RDW 13.8   Platelets 215   (A) Abnormal value            TSH    TSH 4/23/21   TSH 4.660 (A)   (A) Abnormal value            Lipid Panel    Lipid Panel 4/23/21   Total Cholesterol 148   Triglycerides 47   HDL Cholesterol 81 (A)   VLDL Cholesterol 9   LDL Cholesterol  58 (A)   (A) Abnormal value       Comments are available for some flowsheets but are not being displayed.                Data reviewed: Cardiology studies                  ECG 12 Lead    Date/Time: 1/11/2022 1:56 PM  Performed by: Waqas Ramírez MD  Authorized by: Waqas Ramírez MD   Comparison: compared with previous ECG from 2/19/2019  Comparison to previous ECG: There is a significant decrease in heart rate  Rhythm: sinus bradycardia  Rate: bradycardic  Conduction: conduction normal  Q waves: III and aVF    ST Segments: ST segments normal  QRS axis: normal  Other findings comments: Old inferior wall myocardial infarction    Clinical impression: abnormal EKG                Assessment and Plan  "       Diagnoses and all orders for this visit:    1. Coronary artery disease involving native coronary artery of native heart without angina pectoris (Primary)  Assessment & Plan:  Previous myocardial infarction 1986.  Currently stable with no angina.  Continue statins.  Not on aspirin to avoid bleeding risk since she is also on Eliquis.    Orders:  -     Comprehensive Metabolic Panel; Future  -     Lipid Panel; Future  -     ECG 12 Lead    2. Paroxysmal atrial fibrillation (HCC)  Assessment & Plan:  She is currently in sinus bradycardia.  In fact heart rate was 50 or below during the past 3 office visits.  She reports weakness and tiredness.  Will taper off metoprolol to 12.5 mg every other day for next 1 month and then stop.  Continue Eliquis for anticoagulation.    Orders:  -     CBC (No Diff); Future  -     ECG 12 Lead    3. Mixed hyperlipidemia  Assessment & Plan:  LDL in 50s, at goal.  Continue atorvastatin 40 mg at bedtime.    Orders:  -     Comprehensive Metabolic Panel; Future  -     Lipid Panel; Future            Follow Up     Return in about 6 months (around 7/11/2022) for Next scheduled follow up, with Karen STEVENS.    Patient was given instructions and counseling regarding her condition or for health maintenance advice. Please see specific information pulled into the AVS if appropriate.

## 2022-01-14 NOTE — ASSESSMENT & PLAN NOTE
Blood pressure well controlled.  Heart rate on the lower side.  Stopping metoprolol as mentioned above.

## 2022-01-14 NOTE — ASSESSMENT & PLAN NOTE
Previous myocardial infarction 1986.  Currently stable with no angina.  Continue statins.  Not on aspirin to avoid bleeding risk since she is also on Eliquis.

## 2022-01-14 NOTE — ASSESSMENT & PLAN NOTE
She is currently in sinus bradycardia.  In fact heart rate was 50 or below during the past 3 office visits.  She reports weakness and tiredness.  Will taper off metoprolol to 12.5 mg every other day for next 1 month and then stop.  Continue Eliquis for anticoagulation.

## 2022-01-20 NOTE — PROGRESS NOTES
Labs showed high cholesterol levels, it was previously well controlled on the current regimen.    If she is still taking atorvastatin 40 mg daily ?     Other labs showed a potassium of 4.9 which is still normal but at the upper limit of normal.  Recommend to cut down the potassium chloride to once daily (per medication list, she is taking 20 mg twice daily)       Electronically signed by Waqas Ramírez MD, 01/20/22, 3:20 PM EST.

## 2022-01-21 NOTE — TELEPHONE ENCOUNTER
----- Message from Waqas Ramírez MD sent at 1/20/2022  3:20 PM EST -----  Labs showed high cholesterol levels, it was previously well controlled on the current regimen.    If she is still taking atorvastatin 40 mg daily ?     Other labs showed a potassium of 4.9 which is still normal but at the upper limit of normal.  Recommend to cut down the potassium chloride to once daily (per medication list, she is taking 20 mg twice daily)       Electronically signed by Waqas Ramírez MD, 01/20/22, 3:20 PM EST.

## 2022-01-21 NOTE — TELEPHONE ENCOUNTER
Recommend to increase atorvastatin to 80 mg daily.  Please send a new prescription for 80 mg tablets.    Need repeat CMP and lipid panel before next office visit.      Electronically signed by Waqas Ramírez MD, 01/21/22, 9:21 AM EST.

## 2022-01-21 NOTE — TELEPHONE ENCOUNTER
S/W patient regarding labs. Patient confirmed taking KCL BID, advised that Dr. Ramírez recommends decreasing daily.    Patient confirmed she is taking atorvastatin 40 mg daily and hasn't missed any doses.    Advised I would let Dr. Ramírez know and call back with new recommendations.

## 2022-05-27 NOTE — PROGRESS NOTES
Chief Complaint  Chief Complaint   Patient presents with   • Follow-up   • Hypertension   • Hyperlipidemia   • COPD   • Atrial Fibrillation   • Coronary Artery Disease   • Alzheimer's Disease   • Osteoporosis     Subjective          Stacey Juarez presents to [facility]  History of Present Illness    Hypertension/CAD/A-fib:  Patient is taking Amlodipine-Benazepril, Eliquis, Spironolactone.  Patient's Blood Pressure in clinic today is 97/41.  Patient does not monitor blood pressure at home.  Patient denies chest pain, shortness of air, headache, flushing, abnormal swelling in feet/ankles.  Patient's cardiologist is Dr. Finch.    Hyperlipidemia:  Patient is taking Atorvastatin.  Patient denies nocturnal leg cramps, myalgias.  Patient attempts to maintain a diet low in fat and carbohydrates.    COPD:  Patient is taking Vudesonide, Ipratropium-Albuterol Neb, Montelukast, Stiolto, Albuterol HFA PRN.  Patient complaining of increased shortness of air since this morning.  Patient is managed per Dr. Whiteside, Pulmonology.    Alzheminr's Disease:  Patient is taking Donepezil.    Anxiety:  Patient is taking Fluoxetine. Family member states patient is doing OK on medication, but does note that she has been 'down' lately.    Osteoporosis:  Patient is taking Alendronate and is doing well.    Medical History: has a past medical history of AAA (abdominal aortic aneurysm) ([facility]), Acute mesenteric ischemia ([facility]) (03/10/2015), Arthritis, Benign essential hypertension, Bladder disorder, Bladder problem, Cataract, Colon disorder, COPD (chronic obstructive pulmonary disease) ([facility]), Dementia ([facility]), Diverticulitis, Essential hypertension (05/10/2017), Heart attack ([facility]) (1986), Hemorrhoids, Limb swelling, Mixed hyperlipidemia, Myocardial infarction ([facility]) (1986), Osteoporosis (06/04/2019), Oxygen dependent (03/24/2020), Paroxysmal A-fib ([facility]) (08/02/2017), Respiratory failure ([facility]) (12/17/2020),  "Tuberculosis, and Vitamin D deficiency (05/08/2015).   Surgical History: has a past surgical history that includes Back surgery; Bladder surgery; Breast surgery (1979); Carotid endarterectomy; Colonoscopy; Eye surgery; Hysterectomy (1971); Other surgical history; and Lumbar disc surgery (1986).   Family History: family history includes Arthritis in her mother; Heart disease in her father and mother.   Social History: reports that she quit smoking about 4 years ago. Her smoking use included cigarettes. She started smoking about 60 years ago. She smoked 1.00 pack per day. She has never used smokeless tobacco. She reports that she does not drink alcohol and does not use drugs.    Allergies: Penicillins    Health Maintenance Due   Topic Date Due   • TDAP/TD VACCINES (1 - Tdap) Never done   • ZOSTER VACCINE (2 of 3) 11/26/2012   • DXA SCAN  10/09/2020   • COVID-19 Vaccine (4 - Booster for Moderna series) 04/16/2022       Immunization History   Administered Date(s) Administered   • COVID-19 (MODERNA) 1st, 2nd, 3rd Dose Only 02/12/2021, 03/12/2021, 12/16/2021   • Fluad Quad 65+ 12/16/2021   • Fluzone Split Quad (Multi-dose) 10/14/2013, 10/19/2015, 12/04/2018, 12/17/2020   • Influenza TIV (IM) 10/01/2012, 10/27/2014   • Influenza, Unspecified 12/17/2020   • Pneumococcal Conjugate 13-Valent (PCV13) 08/10/2018   • Pneumococcal Polysaccharide (PPSV23) 11/23/2011, 02/10/2016   • Zostavax 10/01/2012     Objective     Vitals:    05/27/22 1233   BP: 97/41   BP Location: Left arm   Patient Position: Sitting   Cuff Size: Adult   Pulse: (!) 43   Temp: 97.5 °F (36.4 °C)   TempSrc: Oral   SpO2: 100%   Weight: 66.3 kg (146 lb 3.2 oz)   Height: 160 cm (63\")   PF: (!) 2 L/min     Physical Exam  Vitals and nursing note reviewed.   Constitutional:       Appearance: Normal appearance.   HENT:      Head: Normocephalic and atraumatic.   Cardiovascular:      Rate and Rhythm: Normal rate. Rhythm irregular.      Heart sounds: Murmur heard. "   Pulmonary:      Effort: Pulmonary effort is normal.      Breath sounds: Normal breath sounds.      Comments: Decreased breath sounds throughout all lung fields  Oxygen nasal canula in place  Musculoskeletal:      Cervical back: Neck supple.   Neurological:      Mental Status: She is alert.   Psychiatric:         Mood and Affect: Mood normal.         Behavior: Behavior normal.       Result Review :                           Assessment and Plan      Diagnoses and all orders for this visit:    1. Essential hypertension (Primary)  -     Comprehensive Metabolic Panel; Future  -     CBC & Differential; Future    2. Pure hypercholesterolemia  -     Lipid Panel; Future    3. Paroxysmal atrial fibrillation (HCC)  Comments:  Stable on eliquis    4. Chronic obstructive pulmonary disease, unspecified COPD type (HCC)  Comments:  Stable on oxygen and inhalers, seeing pulmo    5. Other fatigue  -     TSH; Future  -     Urinalysis With Culture If Indicated -; Future    6. Vitamin D deficiency  -     Vitamin D 25 hydroxy; Future    7. Osteoporosis without current pathological fracture, unspecified osteoporosis type    8. Hypomagnesemia  -     Magnesium; Future    9. SOB (shortness of breath)  -     BNP; Future    10. Supplemental oxygen dependent  Comments:  2L NC 24/7          Follow Up     Return in about 6 months (around 11/27/2022).    Patient was given instructions and counseling regarding her condition or for health maintenance advice. Please see specific information pulled into the AVS if appropriate.

## 2022-06-02 NOTE — TELEPHONE ENCOUNTER
PT COULD NOT MAKE APPOINTMENT TODAY PLEASE CALL DAUGHTER MONICA  AND SEE IF THEY NEED TO RESCHEDULE. [phone].

## 2022-06-09 NOTE — PROGRESS NOTES
Pulmonary Consultation    No ref. provider found,    Thank you for asking me to see Stacey Juarez for   Chief Complaint   Patient presents with   • Hypoxia    • Supplemental oxygen dependent    • Follow-up     6 Month    • Shortness of Breath   • Wheezing   • Cough   .      History of Present Illness  Stacey Juarez is a 89 y.o. female with a PMH significant for COPD chronic respiratory failure and atrial fibrillation presents for follow-up patient has been having cough with wheeze and mucoid expectoration with worsening breathlessness she denies any chest pain fever or hemoptysis she is on home oxygen and has quit smoking       Tobacco use history: Former smoker  Review of Systems: History obtained from chart review and the patient.  Review of Systems   Respiratory: Positive for cough, shortness of breath and wheezing.    All other systems reviewed and are negative.    As described in the HPI. Otherwise, remainder of ROS (14 systems) were negative.    Patient Active Problem List   Diagnosis   • Alzheimer's disease (HCC)   • Pure hypercholesterolemia   • Essential hypertension   • Hypoxia   • Supplemental oxygen dependent   • Mixed hyperlipidemia   • Paroxysmal atrial fibrillation (HCC)   • Coronary artery disease involving native coronary artery of native heart without angina pectoris         Current Outpatient Medications:   •  albuterol sulfate  (90 Base) MCG/ACT inhaler, INHALE 2 PUFFS BY MOUTH EVERY 4 TO 6 HOURS AS NEEDED FOR DIFFICULT BREATHING, Disp: 6.7 g, Rfl: 3  •  alendronate (FOSAMAX) 70 MG tablet, Take 1 tablet by mouth 1 (One) Time Per Week., Disp: 4 tablet, Rfl: 3  •  amLODIPine-benazepril (LOTREL 5-20) 5-20 MG per capsule, TAKE 1 CAPSULE BY MOUTH EVERY DAY, Disp: 90 capsule, Rfl: 1  •  aspirin 81 MG chewable tablet, Chew 81 mg Every Other Day., Disp: , Rfl:   •  atorvastatin (LIPITOR) 80 MG tablet, Take 1 tablet by mouth Daily., Disp: 90 tablet, Rfl: 3  •  budesonide (PULMICORT) 0.5 MG/2ML  nebulizer solution, Take 2 mL by nebulization 2 (two) times a day., Disp: 120 mL, Rfl: 11  •  donepezil (ARICEPT) 5 MG tablet, Take 1 tablet by mouth Every Night., Disp: 90 tablet, Rfl: 3  •  Eliquis 2.5 MG tablet tablet, TAKE 1 TABLET BY MOUTH TWICE DAILY, Disp: 180 tablet, Rfl: 1  •  FLUoxetine (PROzac) 10 MG capsule, TAKE 1 CAPSULE(10 MG) BY MOUTH EVERY DAY, Disp: 90 capsule, Rfl: 1  •  MAGnesium-Oxide 400 (241.3 Mg) MG tablet tablet, TAKE 1 TABLET TWICE DAILY, Disp: 60 tablet, Rfl: 9  •  montelukast (SINGULAIR) 10 MG tablet, TAKE 1 TABLET BY MOUTH AT BEDTIME, Disp: 30 tablet, Rfl: 3  •  potassium chloride (KAYCIEL) 20 mEq/15 mL solution, TAKE 15 ML BY MOUTH TWICE DAILY, Disp: 900 mL, Rfl: 3  •  spironolactone (ALDACTONE) 25 MG tablet, TAKE 1 TABLET BY MOUTH EVERY DAY, Disp: 90 tablet, Rfl: 3  •  Stiolto Respimat 2.5-2.5 MCG/ACT aerosol solution inhaler, INHALE 2 PUFFS EVERY DAY, Disp: 4 g, Rfl: 5  •  azithromycin (ZITHROMAX) 250 MG tablet, Take 2 by mouth today then 1 daily for 4 days, Disp: 6 tablet, Rfl: 0  •  ipratropium-albuterol (DUO-NEB) 0.5-2.5 mg/3 ml nebulizer, Take 3 mL by nebulization 4 (Four) Times a Day for 120 doses., Disp: 3 mL, Rfl: 11    Allergies   Allergen Reactions   • Penicillins Anaphylaxis       Past Medical History:   Diagnosis Date   • AAA (abdominal aortic aneurysm) ([facility])    • Acute mesenteric ischemia ([facility]) 03/10/2015   • Arthritis    • Benign essential hypertension    • Bladder disorder    • Bladder problem    • Cataract    • Colon disorder    • COPD (chronic obstructive pulmonary disease) ([facility])    • Dementia ([facility])    • Diverticulitis    • Essential hypertension 05/10/2017   • Heart attack ([facility]) 1986   • Hemorrhoids    • Limb swelling    • Mixed hyperlipidemia    • Myocardial infarction ([facility]) 1986   • Osteoporosis 06/04/2019   • Oxygen dependent 03/24/2020   • Paroxysmal A-fib ([facility]) 08/02/2017   • Respiratory failure ([facility]) 12/17/2020   • Tuberculosis     OR POSITIVE PPD TEST   •  "Vitamin D deficiency 2015     Past Surgical History:   Procedure Laterality Date   • BACK SURGERY     • BLADDER SURGERY      RECONSTRUCTION   • BREAST SURGERY     • CAROTID ENDARTERECTOMY     • COLONOSCOPY     • EYE SURGERY      EYE IMPLANT   • HYSTERECTOMY     • LUMBAR DISC SURGERY     • OTHER SURGICAL HISTORY      JOINT SURGERY UNSPECIFIED     Social History     Socioeconomic History   • Marital status:    Tobacco Use   • Smoking status: Former Smoker     Packs/day: 1.00     Types: Cigarettes     Start date: 1961     Quit date: 2017     Years since quittin.9   • Smokeless tobacco: Never Used   • Tobacco comment: SMOKED 31 PLUS YEARS   Vaping Use   • Vaping Use: Never used   Substance and Sexual Activity   • Alcohol use: Never   • Drug use: Never   • Sexual activity: Defer     Family History   Problem Relation Age of Onset   • Heart disease Mother    • Arthritis Mother    • Heart disease Father           Objective     Blood pressure 107/71, pulse 84, temperature 98.1 °F (36.7 °C), temperature source Temporal, resp. rate 19, height 160 cm (63\"), weight 66.2 kg (146 lb), SpO2 99 %, not currently breastfeeding.  Physical Exam  Vitals and nursing note reviewed.   Constitutional:       Appearance: Normal appearance.   HENT:      Head: Normocephalic and atraumatic.      Nose: Nose normal.      Mouth/Throat:      Mouth: Mucous membranes are moist.      Pharynx: Oropharynx is clear.   Eyes:      Extraocular Movements: Extraocular movements intact.      Conjunctiva/sclera: Conjunctivae normal.      Pupils: Pupils are equal, round, and reactive to light.   Cardiovascular:      Rate and Rhythm: Normal rate. Rhythm irregular.      Pulses: Normal pulses.      Heart sounds: Normal heart sounds.   Pulmonary:      Effort: Pulmonary effort is normal.      Breath sounds: Rhonchi present.   Abdominal:      General: Abdomen is flat. Bowel sounds are normal.      Palpations: Abdomen is soft. "   Musculoskeletal:         General: Normal range of motion.      Cervical back: Normal range of motion and neck supple.   Skin:     General: Skin is warm.      Capillary Refill: Capillary refill takes less than 2 seconds.   Neurological:      General: No focal deficit present.      Mental Status: She is alert and oriented to person, place, and time.   Psychiatric:         Mood and Affect: Mood normal.         Behavior: Behavior normal.       Immunization History   Administered Date(s) Administered   • COVID-19 (MODERNA) 1st, 2nd, 3rd Dose Only 02/12/2021, 03/12/2021, 12/16/2021   • Fluad Quad 65+ 12/16/2021   • Fluzone Split Quad (Multi-dose) 10/14/2013, 10/19/2015, 12/04/2018, 12/17/2020   • Influenza TIV (IM) 10/01/2012, 10/27/2014   • Influenza, Unspecified 12/17/2020   • Pneumococcal Conjugate 13-Valent (PCV13) 08/10/2018   • Pneumococcal Polysaccharide (PPSV23) 11/23/2011, 02/10/2016   • Zostavax 10/01/2012            Assessment & Plan     Diagnoses and all orders for this visit:    1. COPD with acute exacerbation (HCC) (Primary)  -     XR Chest 2 View; Future  -     Full Pulmonary Function Test With Bronchodilator; Future    2. Asthma-COPD overlap syndrome (HCC)  -     XR Chest 2 View; Future  -     Full Pulmonary Function Test With Bronchodilator; Future    3. Dyspnea on exertion  -     XR Chest 2 View; Future  -     Full Pulmonary Function Test With Bronchodilator; Future    4. Chronic respiratory failure with hypoxia (HCC)    5. Atrial fibrillation, unspecified type (HCC)    Other orders  -     azithromycin (ZITHROMAX) 250 MG tablet; Take 2 by mouth today then 1 daily for 4 days  Dispense: 6 tablet; Refill: 0         Discussion/ Recommendations:   Continue home oxygen  Will get PFTs and chest x-ray done  Zithromax 1 Z-Riki  Duo nebs every 6 hours as needed  Pulmicort nebs every 12  Weight reduction  Discussed vaccination and recommended    BMI is >= 25 and <30. (Overweight) The following options were offered  after discussion;: nutrition counseling/recommendations           Return in about 3 months (around 9/9/2022).      Thank you for allowing me to participate in the care of Stacey Juarez. Please do not hesitate to contact me with any questions.         This document has been electronically signed by Fritz Mao MD on June 9, 2022 10:39 EDT

## 2022-06-12 NOTE — ED PROVIDER NOTES
Time: 5:37 PM EDT  Arrived by: private car  Chief Complaint: SOA  History provided by: Patient  History is limited by: N/A     History of Present Illness:  Patient is a 89 y.o. female with a hx of COPD that presents to the emergency department with worsening SOA over the past couple of days. The pt has a hx of COPD and is chronically on 2L of O2. The pt was seen by pulmonology 3 days ago and was placed on a Z-pack at that time. She has been doing nebulizer treatments at home. She denies any pain to any body system and does not report any CP. Denies any fevers, N/V. The pt is on Eliquis for A-fib. She reports feeling mild SOA at time of evaluation. The pt's symptoms are moderate in severity and have no other reported modifying factors.       History provided by:  Patient   used: No        Similar Symptoms Previously: Hx of COPD  Recently seen: Seen by pulmonology on 6/9/22      Patient Care Team  Primary Care Provider: Jered Hewitt PA    Past Medical History:     Allergies   Allergen Reactions   • Penicillins Anaphylaxis     Past Medical History:   Diagnosis Date   • AAA (abdominal aortic aneurysm) ([facility])    • Acute mesenteric ischemia ([facility]) 03/10/2015   • Arthritis    • Benign essential hypertension    • Bladder disorder    • Bladder problem    • Cataract    • Colon disorder    • COPD (chronic obstructive pulmonary disease) ([facility])    • Dementia ([facility])    • Diverticulitis    • Essential hypertension 05/10/2017   • Heart attack ([facility]) 1986   • Hemorrhoids    • Limb swelling    • Mixed hyperlipidemia    • Myocardial infarction ([facility]) 1986   • Osteoporosis 06/04/2019   • Oxygen dependent 03/24/2020   • Paroxysmal A-fib ([facility]) 08/02/2017   • Respiratory failure ([facility]) 12/17/2020   • Tuberculosis     OR POSITIVE PPD TEST   • Vitamin D deficiency 05/08/2015     Past Surgical History:   Procedure Laterality Date   • BACK SURGERY     • BLADDER SURGERY      RECONSTRUCTION   • BREAST SURGERY  1979   • CAROTID  ENDARTERECTOMY     • COLONOSCOPY     • EYE SURGERY      EYE IMPLANT   • HYSTERECTOMY  1971   • LUMBAR DISC SURGERY  1986   • OTHER SURGICAL HISTORY      JOINT SURGERY UNSPECIFIED     Family History   Problem Relation Age of Onset   • Heart disease Mother    • Arthritis Mother    • Heart disease Father        Home Medications:  Prior to Admission medications    Medication Sig Start Date End Date Taking? Authorizing Provider   albuterol sulfate  (90 Base) MCG/ACT inhaler INHALE 2 PUFFS BY MOUTH EVERY 4 TO 6 HOURS AS NEEDED FOR DIFFICULT BREATHING 2/23/22   Jasmeet Whiteside MD   alendronate (FOSAMAX) 70 MG tablet Take 1 tablet by mouth 1 (One) Time Per Week. 5/2/22   Jered Hewitt PA   amLODIPine-benazepril (LOTREL 5-20) 5-20 MG per capsule TAKE 1 CAPSULE BY MOUTH EVERY DAY 4/12/22   Yolanda Lagunas APRN   aspirin 81 MG chewable tablet Chew 81 mg Every Other Day.    Provider, MD Adam   atorvastatin (LIPITOR) 80 MG tablet Take 1 tablet by mouth Daily. 1/21/22   Waqas Ramírez MD   azithromycin (ZITHROMAX) 250 MG tablet Take 2 by mouth today then 1 daily for 4 days 6/9/22   Fritz Mao MD   budesonide (PULMICORT) 0.5 MG/2ML nebulizer solution Take 2 mL by nebulization 2 (two) times a day. 9/14/21   Lauren Cedillo APRN   donepezil (ARICEPT) 5 MG tablet Take 1 tablet by mouth Every Night. 8/20/21   Jered Hewitt PA   Eliquis 2.5 MG tablet tablet TAKE 1 TABLET BY MOUTH TWICE DAILY 4/12/22   Yolanda Lagunas APRN   FLUoxetine (PROzac) 10 MG capsule TAKE 1 CAPSULE(10 MG) BY MOUTH EVERY DAY 1/4/22   Jered Hewitt PA   ipratropium-albuterol (DUO-NEB) 0.5-2.5 mg/3 ml nebulizer Take 3 mL by nebulization 4 (Four) Times a Day for 120 doses. 7/7/21 8/6/21  Jasmeet Whiteside MD   MAGnesium-Oxide 400 (241.3 Mg) MG tablet tablet TAKE 1 TABLET TWICE DAILY 7/12/21   Yolanda Lagunas APRN   montelukast (SINGULAIR) 10 MG tablet TAKE 1 TABLET BY MOUTH AT BEDTIME 3/7/22   Jasmeet Whiteside MD   potassium chloride  "(DALE) 20 mEq/15 mL solution TAKE 15 ML BY MOUTH TWICE DAILY 22   Waqas Ramírez MD   spironolactone (ALDACTONE) 25 MG tablet TAKE 1 TABLET BY MOUTH EVERY DAY 10/8/21   Camelia Shaw MD   Stiolto Respimat 2.5-2.5 MCG/ACT aerosol solution inhaler INHALE 2 PUFFS EVERY DAY 10/20/21   Lauren Cedillo APRN        Social History:   Social History     Tobacco Use   • Smoking status: Former Smoker     Packs/day: 1.00     Types: Cigarettes     Start date: 1961     Quit date: 2017     Years since quittin.9   • Smokeless tobacco: Never Used   • Tobacco comment: SMOKED 31 PLUS YEARS   Vaping Use   • Vaping Use: Never used   Substance Use Topics   • Alcohol use: Never   • Drug use: Never     Recent travel: no     Review of Systems:  Review of Systems   Constitutional: Negative for chills and fever.   HENT: Negative for congestion, rhinorrhea and sore throat.    Eyes: Negative for pain and visual disturbance.   Respiratory: Positive for shortness of breath. Negative for apnea, cough and chest tightness.    Cardiovascular: Negative for chest pain and palpitations.   Gastrointestinal: Negative for abdominal pain, diarrhea, nausea and vomiting.   Genitourinary: Negative for difficulty urinating and dysuria.   Musculoskeletal: Negative for joint swelling and myalgias.   Skin: Negative for color change.   Neurological: Negative for seizures and headaches.   Psychiatric/Behavioral: Negative.    All other systems reviewed and are negative.       Physical Exam:  BP 97/53 (BP Location: Left arm, Patient Position: Lying)   Pulse 75   Temp 98.6 °F (37 °C) (Oral)   Resp 16   Ht 160 cm (63\")   Wt 66.1 kg (145 lb 11.6 oz)   SpO2 94%   BMI 25.81 kg/m²     Physical Exam  Vitals and nursing note reviewed.   Constitutional:       General: She is not in acute distress.     Appearance: Normal appearance. She is not toxic-appearing.   HENT:      Head: Normocephalic and atraumatic.      Jaw: There is normal jaw " occlusion.   Eyes:      General: Lids are normal.      Extraocular Movements: Extraocular movements intact.      Conjunctiva/sclera: Conjunctivae normal.      Pupils: Pupils are equal, round, and reactive to light.   Cardiovascular:      Rate and Rhythm: Normal rate and regular rhythm.      Pulses: Normal pulses.      Heart sounds: Normal heart sounds.   Pulmonary:      Effort: Pulmonary effort is normal. No respiratory distress.      Breath sounds: Decreased breath sounds (bilateral) present. No wheezing or rhonchi.      Comments: Pt is on 2L at baseline  Abdominal:      General: Abdomen is flat.      Palpations: Abdomen is soft.      Tenderness: There is no abdominal tenderness. There is no guarding or rebound.   Musculoskeletal:         General: Normal range of motion.      Cervical back: Normal range of motion and neck supple.      Right lower leg: No edema.      Left lower leg: No edema.   Skin:     General: Skin is warm and dry.   Neurological:      Mental Status: She is alert and oriented to person, place, and time. Mental status is at baseline.   Psychiatric:         Mood and Affect: Mood normal.                Medications in the Emergency Department:  Medications   sodium chloride 0.9 % flush 10 mL (has no administration in time range)   ipratropium-albuterol (DUO-NEB) nebulizer solution 3 mL (3 mL Nebulization Given 6/12/22 1758)        Labs  Lab Results (last 24 hours)     Procedure Component Value Units Date/Time    CBC & Differential [[id number]]  (Abnormal) Collected: 06/12/22 1627    Specimen: Blood Updated: 06/12/22 1640    Narrative:      The following orders were created for panel order CBC & Differential.  Procedure                               Abnormality         Status                     ---------                               -----------         ------                     CBC Auto Differential[[id number]]        Abnormal            Final result                 Please view results for these  tests on the individual orders.    Comprehensive Metabolic Panel [[id number]]  (Abnormal) Collected: 06/12/22 1627    Specimen: Blood Updated: 06/12/22 1714     Glucose 100 mg/dL      BUN 24 mg/dL      Creatinine 2.01 mg/dL      Sodium 140 mmol/L      Potassium 4.6 mmol/L      Chloride 104 mmol/L      CO2 22.9 mmol/L      Calcium 9.9 mg/dL      Total Protein 7.5 g/dL      Albumin 4.40 g/dL      ALT (SGPT) <5 U/L      AST (SGOT) 14 U/L      Alkaline Phosphatase 117 U/L      Total Bilirubin 0.3 mg/dL      Globulin 3.1 gm/dL      A/G Ratio 1.4 g/dL      BUN/Creatinine Ratio 11.9     Anion Gap 13.1 mmol/L      eGFR 23.2 mL/min/1.73      Comment: National Kidney Foundation and American Society of Nephrology (ASN) Task Force recommended calculation based on the Chronic Kidney Disease Epidemiology Collaboration (CKD-EPI) equation refit without adjustment for race.       Narrative:      GFR Normal >60  Chronic Kidney Disease <60  Kidney Failure <15      BNP [[id number]]  (Normal) Collected: 06/12/22 1627    Specimen: Blood Updated: 06/12/22 1659     proBNP 295.9 pg/mL     Narrative:      Among patients with dyspnea, NT-proBNP is highly sensitive for the detection of acute congestive heart failure. In addition NT-proBNP of <300 pg/ml effectively rules out acute congestive heart failure with 99% negative predictive value.    Results may be falsely decreased if patient taking Biotin.      Troponin [[id number]]  (Normal) Collected: 06/12/22 1627    Specimen: Blood Updated: 06/12/22 1702     Troponin T 0.015 ng/mL     Narrative:      Troponin T Reference Range:  <= 0.03 ng/mL-   Negative for AMI  >0.03 ng/mL-     Abnormal for myocardial necrosis.  Clinicians would have to utilize clinical acumen, EKG, Troponin and serial changes to determine if it is an Acute Myocardial Infarction or myocardial injury due to an underlying chronic condition.       Results may be falsely decreased if patient taking Biotin.      CBC Auto Differential  [[id number]]  (Abnormal) Collected: 06/12/22 1627    Specimen: Blood Updated: 06/12/22 1640     WBC 5.68 10*3/mm3      RBC 3.92 10*6/mm3      Hemoglobin 10.1 g/dL      Hematocrit 33.7 %      MCV 86.0 fL      MCH 25.8 pg      MCHC 30.0 g/dL      RDW 14.4 %      RDW-SD 44.8 fl      MPV 8.9 fL      Platelets 258 10*3/mm3      Neutrophil % 80.4 %      Lymphocyte % 10.9 %      Monocyte % 7.4 %      Eosinophil % 0.7 %      Basophil % 0.2 %      Immature Grans % 0.4 %      Neutrophils, Absolute 4.57 10*3/mm3      Lymphocytes, Absolute 0.62 10*3/mm3      Monocytes, Absolute 0.42 10*3/mm3      Eosinophils, Absolute 0.04 10*3/mm3      Basophils, Absolute 0.01 10*3/mm3      Immature Grans, Absolute 0.02 10*3/mm3      nRBC 0.0 /100 WBC            Imaging:  XR Chest 1 View    Result Date: 6/12/2022  PROCEDURE: XR CHEST 1 VW  COMPARISON: [facility], CR, CHEST AP/PA 1 VIEW, 12/19/2019, 23:51.  INDICATIONS: SOA Triage Protocol  FINDINGS:  The heart is unchanged in size.  The lungs are well-expanded and free of infiltrates.  Moderate degenerative spurring is seen in the glenohumeral joints bilaterally.       No active disease is seen.       TAMMY MINOR MD       Electronically Signed and Approved By: TAMMY MINOR MD on 6/12/2022 at 17:14               Procedures:  Procedures    Progress  ED Course as of 06/12/22 1939   Sun Jun 12, 2022   1739 EKG shows sinus rhythm with PACs.  Heart rate 80.  QTc 410. [LQ]      ED Course User Index  [LQ] Diana Rae MD                            Medical Decision Making:  MDM  Number of Diagnoses or Management Options  COPD exacerbation (HCC)  Diagnosis management comments: ddx: Pneumonia, pneumothorax, ACS, PE, asthma, COPD    Patient initially stated she was mildly short of breath.  After breathing treatment she states she has no shortness of breath.  She denies any chest pain.  Troponin is negative.  CBC shows, no leukocytosis, hemoglobin within normal limits, normal platelets,  and no other concerning findings.  Chest x-ray showed no infiltrate.  Patient is on Eliquis for paroxysmal A. fib.  As patient has no shortness of breath low suspicion for PE at time of discharge.  Discussed with the daughter and her strict return precautions and follow-up discussed continuing nebulizer use at home.       Amount and/or Complexity of Data Reviewed  Clinical lab tests: reviewed  Tests in the radiology section of CPT®: reviewed  Review and summarize past medical records: yes (Pt was seen by pulmonology on 6/9/22 and was placed on a Z-pack at that time. She is chronically dependent on supplemental O2. )         Final diagnoses:   COPD exacerbation (HCC)        Disposition:  ED Disposition     ED Disposition   Discharge    Condition   Stable    Comment   --             Documentation assistance provided by Devin Melgar acting as scribe for Diana Rae MD  . Information recorded by the scribe was done at my direction and has been verified and validated by me.        Devin Melgar  06/12/22 1746       Devin Melgar  06/12/22 1745       Diana Rae MD  06/12/22 [id number]

## 2022-06-12 NOTE — DISCHARGE INSTRUCTIONS
Watch your symptoms closely.  If you have any chest pain or trouble breathing, return to the emergency department.  Follow-up with your primary care doctor.

## 2022-06-13 NOTE — TELEPHONE ENCOUNTER
----- Message from FREDA Jane sent at 6/13/2022 10:28 AM EDT -----  Iron Def - Begin ferrous sulfate 325mg BID #[id number]

## 2022-06-13 NOTE — TELEPHONE ENCOUNTER
----- Message from FREDA Jane sent at 6/10/2022  6:43 AM EDT -----  1.  Renal insuff noted, avoid NSAIDs, motrin  2.  Hold any calcium supplementation  3. Vitamin D Deficiency noted - begin Vitamin D 50,000 IU weekly #[id number]  4. Anemia - add iron and B12/Folate to labs, occult blood x3

## 2022-06-14 NOTE — TELEPHONE ENCOUNTER
----- Message from FREDA Jane sent at 6/14/2022  6:30 AM EDT -----  Begin Vitamin B12 daily oral, rx sent to pharmacy

## 2022-07-13 PROBLEM — R55 SYNCOPE: Status: ACTIVE | Noted: 2022-01-01

## 2022-07-13 NOTE — H&P
[facility]   HOSPITALIST HISTORY AND PHYSICAL  Date: 2022   Patient Name: Stacey Juarez  : 1931  MRN: [id number]  Primary Care Physician:  Jered Hewitt PA  Date of admission: 2022    Subjective   Subjective     Chief Complaint: Syncope    HPI:  Stacey Juarez is a 89 y.o. female with with COPD, hypertension, chronic hypoxic respiratory failure, CAD, paroxysmal atrial fibrillation on Eliquis who presented to the ED after syncopal episode while sitting in the car.  Patient was to go to an outpatient cardiology appointment today, her daughter (present bedside), picked her up and take her to an appointment and patient was weak and fatigued and needed help getting into the car once in the car and the daughter states that the patient is lost consciousness.  She continued to breathe but was unresponsive and not not answering questions or following commands.  Patient was then brought to the ED for further evaluation and management.  Daughter states that patient was conscious for roughly 10-15 minutes total.  Of note, patient reportedly had also been complaining of an ongoing headache for roughly 2 weeks prior to presentation in the ED and the daughter also noted that the patient had progressive fatigue and generalized weakness.  Eval in the ED significant for labs showing elevated creatinine and hyperkalemia.  Orthostatic vital signs positive.  Patient's mentation had returned to baseline by time of evaluation in the ED.  Given syncopal episode, orthostatics being positive and lab abnormalities hospitalist service contacted for further evaluation and management.      Personal History     Past Medical History:  COPD  Chronic hypoxic respiratory failure on 2L nasal cannula at home  CAD  Paroxysmal atrial fibrillation on Eliquis  Hyperlipidemia  Essential hypertension  Alzheimer's dementia    Past Surgical History:  Back surgery  Bladder surgery  Carotid endarterectomy  Hysterectomy  Lumbar disc  surgery    Family History:   Mother: ; History of Parkinson's and heart disease  Father: ; history of heart disease    Social History:   Tobacco: Former smoker, previously smoked 1 pack/day.  Started smoking in 1961 and quit smoking in 2017  Alcohol: Denies use  Illicit Substances: Denies use    Home Medications:  FLUoxetine, Magnesium Oxide, albuterol sulfate HFA, alendronate, amLODIPine-benazepril, apixaban, aspirin, atorvastatin, azithromycin, budesonide, donepezil, ferrous sulfate, ipratropium-albuterol, montelukast, potassium chloride, spironolactone, tiotropium bromide-olodaterol, vitamin B-12, and vitamin D    Allergies:  Allergies   Allergen Reactions   • Penicillins Anaphylaxis       Review of Systems  All systems were reviewed and negative except for:   -General ROS: positive for  - fatigue  -Cardiovascular ROS: positive for - loss of consciousness and shortness of breath  -Neurological ROS: positive for - memory loss      Objective   Objective     Vitals:   Temp:  [97.5 °F (36.4 °C)] 97.5 °F (36.4 °C)  Heart Rate:  [55-74] 55  Resp:  [17] 17  BP: ()/(42-71) 95/42  Flow (L/min):  [2] 2    Physical Exam    Constitutional: Awake, alert, no acute distress, lying in bed, frail elderly female   Eyes: Pupils equal, sclerae anicteric, no conjunctival injection   HENT: NCAT, mucous membranes moist   Neck: Supple, no thyromegaly, no lymphadenopathy, trachea midline   Respiratory: Clear to auscultation bilaterally, nonlabored respirations, good aeration, equal chest rise bilaterally   Cardiovascular: Regular rhythm, bradycardic, no murmurs, rubs, or gallops, palpable pedal pulses bilaterally   Gastrointestinal: Positive bowel sounds, soft, nontender, nondistended   Musculoskeletal: No bilateral ankle edema, no clubbing or cyanosis to extremities   Psychiatric: Appropriate affect, cooperative   Neurologic: Oriented x 2 (self, place), strength symmetric in all extremities, Cranial Nerves grossly  intact, speech clear   Skin: No rashes     Result Review    Result Review:  I have personally reviewed the results from the time of this admission to 7/13/2022 15:27 EDT and agree with these findings:  [x]  Laboratory: Creatinine elevated.  Hyperkalemia noted.  Troponin T within normal limits.  proBNP not elevated.  Hemoglobin at baseline.  []  Microbiology:  [x]  Radiology: CT head reviewed, no acute intracranial abnormalities appreciated.  No acute hemorrhage, midline shift noted.  Chest x-ray reviewed.  Cardiomegaly noted.  No pneumothorax or overt opacity noted.  [x]  EKG/Telemetry: Sinus rhythm.  No STEMI.  []  Cardiology/Vascular:   []  Pathology:  []  Old records:  []  Other:      Assessment & Plan   Assessment / Plan     Assessment:  Syncope  Acute renal failure  Hyperkalemia  Orthostatic hypotension  Paroxysmal atrial fibrillation on chronic anticoagulation with Eliquis  Essential hypertension  Alzheimer's dementia  History of coronary artery disease  Hyperlipidemia  History of essential hypertension  COPD, not acutely exacerbated  Chronic hypoxic respiratory failure    Plan:  -Admit to monitored bed  -Continue telemetry monitoring  -Start IV fluids at low rate, monitor volume status closely  -Cardiology consulted is most of her medications are managed by cardiology as an outpatient and patient with syncopal episode  -Suspect worsening renal function and hyperkalemia are secondary to continued diuresis, decreased p.o. intake, continued use of spironolactone, continued use of amlodipine-benazepril and continued outpatient potassium supplementation. Will hold all of these medications and monitor closely.  -PT/OT consulted  -Start appropriate home medications  -Monitor electrolytes and renal function with BMP and magnesium level in the AM  -Monitor WBC and Hgb with CBC in the AM  -Clinical course will dictate further management     DVT Prophylaxis: Apixaban  Diet: Regular  Dispo: PT/OT consulted  Code Status:  Full code     Personally reviewed patients labs and imaging, discussed with patient and nurse at bedside. Discussed case with the ED provider (HILDA Quiñones) and the following consultants: Cardiology.     Part of this note may be an electronic transcription/translation of spoken language to printed text using the Dragon dictation system.      DVT prophylaxis:  No DVT prophylaxis order currently exists.    CODE STATUS:    Code Status (Patient has no pulse and is not breathing): CPR (Attempt to Resuscitate)  Medical Interventions (Patient has pulse or is breathing): Full Support      Admission Status: I believe this patient meets inpatient status.    Electronically signed by Gabriel Laureano MD, 07/13/22, 3:27 PM EDT.

## 2022-07-13 NOTE — ED PROVIDER NOTES
Emergency Department Encounter    Room number: 30/30  Date seen: 7/13/2022  PCP: Jered Hewitt PA      History provided by:  Patient   used: No          HPI:  Chief complaint: near syncope    Context: Stacey Juarez is a 89 y.o. female with a history of COPD, hypertension, O2 dependency, diverticulitis, MI, A. fib, carotid endarterectomy, hysterectomy, back surgery, fib on Eliquis who presents to the ED with near syncope while sitting in car.  His daughter at the bedside states at 10 AM she was sitting in the car after doctor's appointment with Dr. Hathaway (cardiology) and she became unresponsive.  She was not fully unconscious but was wet but was not responding for about 15 minutes daughter states her diet has been decreased.  Is also been complaining of a headache ongoing for 2 weeks and generalized weakness today.  She denies fever, cough, chest pain, shortness of breath, abdominal pain, dysuria, hematuria, bloody stools, nausea, vomiting, constipation or diarrhea, disturbance, difficulty speech, problems walking, weakness in extremities, trauma/fall/head injury, or other complaint.  She denies smoking or alcohol use.    Location: near syncope/unresponsive  Quality: n/a  Severity: n/a  Radiation: n/a  Duration: once  Onset: PTA  Modifying factors: headache        Old records reviewed:  Seen in the ED June 12, 2022 for COPD exacerbation.    Triage Vitals:  ED Triage Vitals [07/13/22 1036]   Temp Heart Rate Resp BP SpO2   97.5 °F (36.4 °C) 69 17 (!) 85/53 100 %      Temp src Heart Rate Source Patient Position BP Location FiO2 (%)   Oral Monitor Sitting Right arm --         Review of Systems   Constitutional: Negative for chills and fever.   HENT: Negative for rhinorrhea and sore throat.    Respiratory: Negative for cough and shortness of breath.    Cardiovascular: Negative for chest pain and palpitations.   Gastrointestinal: Negative for abdominal pain, diarrhea, nausea and vomiting.    Genitourinary: Negative for dysuria and flank pain.   Musculoskeletal: Negative for back pain and myalgias.   Skin: Negative for rash and wound.   Neurological: Positive for headaches. Negative for dizziness, facial asymmetry and speech difficulty.   Psychiatric/Behavioral: Negative for sleep disturbance and suicidal ideas.         Physical Exam  Constitutional:       Appearance: Normal appearance.   HENT:      Head: Normocephalic and atraumatic.      Nose: Nose normal.   Eyes:      Extraocular Movements: Extraocular movements intact.      Pupils: Pupils are equal, round, and reactive to light.   Cardiovascular:      Rate and Rhythm: Normal rate and regular rhythm.   Pulmonary:      Effort: Pulmonary effort is normal.      Breath sounds: Normal breath sounds.   Abdominal:      General: Bowel sounds are normal.      Palpations: Abdomen is soft.   Musculoskeletal:         General: Normal range of motion.      Cervical back: Normal range of motion.      Comments: BLE warm and well perfused   Skin:     General: Skin is warm.   Neurological:      General: No focal deficit present.      Mental Status: She is alert and oriented to person, place, and time.      Comments: Symmetric smile, no dysarthria, tongue midline.  Cranial nerves II through XII normal as tested.  No pronator drift.  Normal heel to shin motion.  Sensation intact and equal bilaterally. Normal finger-to-nose.   Psychiatric:         Mood and Affect: Mood normal.         Behavior: Behavior normal.         Thought Content: Thought content normal.         Judgment: Judgment normal.             Allergies:  Penicillins  Patient's allergies reviewed    Past Medical History:  Past Medical History:   Diagnosis Date   • AAA (abdominal aortic aneurysm) (HCC)    • Acute mesenteric ischemia (HCC) 03/10/2015   • Arthritis    • Benign essential hypertension    • Bladder disorder    • Bladder problem    • Cataract    • Colon disorder    • COPD (chronic obstructive  pulmonary disease) ([facility])    • Dementia ([facility])    • Diverticulitis    • Essential hypertension 05/10/2017   • Heart attack ([facility]) 1986   • Hemorrhoids    • Limb swelling    • Mixed hyperlipidemia    • Myocardial infarction ([facility]) 1986   • Osteoporosis 06/04/2019   • Oxygen dependent 03/24/2020   • Paroxysmal A-fib ([facility]) 08/02/2017   • Respiratory failure ([facility]) 12/17/2020   • Tuberculosis     OR POSITIVE PPD TEST   • Vitamin D deficiency 05/08/2015         Past Surgical History:  Past Surgical History:   Procedure Laterality Date   • BACK SURGERY     • BLADDER SURGERY      RECONSTRUCTION   • BREAST SURGERY  1979   • CAROTID ENDARTERECTOMY     • COLONOSCOPY     • EYE SURGERY      EYE IMPLANT   • HYSTERECTOMY  1971   • LUMBAR DISC SURGERY  1986   • OTHER SURGICAL HISTORY      JOINT SURGERY UNSPECIFIED       Procedures    Labs Reviewed   COMPREHENSIVE METABOLIC PANEL - Abnormal; Notable for the following components:       Result Value    Glucose 121 (*)     BUN 41 (*)     Creatinine 2.32 (*)     Sodium 135 (*)     Potassium 5.3 (*)     Calcium 10.5 (*)     eGFR 19.5 (*)     All other components within normal limits    Narrative:     GFR Normal >60  Chronic Kidney Disease <60  Kidney Failure <15     MAGNESIUM - Abnormal; Notable for the following components:    Magnesium 2.6 (*)     All other components within normal limits   URINALYSIS W/ CULTURE IF INDICATED - Abnormal; Notable for the following components:    Appearance, UA Cloudy (*)     Ketones, UA Trace (*)     All other components within normal limits    Narrative:     In absence of clinical symptoms, the presence of pyuria, bacteria, and/or nitrites on the urinalysis result does not correlate with infection.  Urine microscopic not indicated.   CBC WITH AUTO DIFFERENTIAL - Abnormal; Notable for the following components:    Hemoglobin 10.0 (*)     Hematocrit 33.4 (*)     MCH 26.5 (*)     MCHC 29.9 (*)     RDW 15.9 (*)     Neutrophil % 77.1 (*)     Lymphocyte % 14.5 (*)      All other components within normal limits   TROPONIN (IN-HOUSE) - Normal    Narrative:     Troponin T Reference Range:  <= 0.03 ng/mL-   Negative for AMI  >0.03 ng/mL-     Abnormal for myocardial necrosis.  Clinicians would have to utilize clinical acumen, EKG, Troponin and serial changes to determine if it is an Acute Myocardial Infarction or myocardial injury due to an underlying chronic condition.       Results may be falsely decreased if patient taking Biotin.     BNP (IN-HOUSE) - Normal    Narrative:     Among patients with dyspnea, NT-proBNP is highly sensitive for the detection of acute congestive heart failure. In addition NT-proBNP of <300 pg/ml effectively rules out acute congestive heart failure with 99% negative predictive value.    Results may be falsely decreased if patient taking Biotin.     RAINBOW DRAW    Narrative:     The following orders were created for panel order [facility] Draw.  Procedure                               Abnormality         Status                     ---------                               -----------         ------                     Green Top (Gel)[[id number]]                                  Final result               Lavender Top[[id number]]                                     Final result               Gold Top - SST[[id number]]                                   Final result               Light Blue Top[[id number]]                                   Final result                 Please view results for these tests on the individual orders.   CBC AND DIFFERENTIAL    Narrative:     The following orders were created for panel order CBC & Differential.  Procedure                               Abnormality         Status                     ---------                               -----------         ------                     CBC Auto Differential[[id number]]        Abnormal            Final result                 Please view results for these tests on the individual orders.   GREEN  TOP   LAVENDER TOP   GOLD TOP - SST   LIGHT BLUE TOP       Full Pulmonary Function Test With Bronchodilator    Result Date: 7/5/2022  Narrative: PFT interpretation Spirometry shows very severe obstructive defect. FEV1/FVC is 34. FEV1 is 0.35 liters, 30 percent of predicted. FVC is 1.03 liters, 69 percent of predicted. There is a significant response to bronchodilator administration.  FEV1 increased from 0.35 L to 0.57 L, 43% change.  FVC increased from 1.03 L to 1.19 L, 16% change. Lung volumes: Lung volumes show restriction as well. Total lung capacity is 1.14 liters, 27 percent of predicted. Residual volume is 0.05 liters, 2 percent of predicted. Diffusion capacity is 2.63 mL/min/mmHg, 14 percent of predicted. Flow volume loop is compatible with mixed obstructive and restrictive process. Comparision: Compared to pulmonary function test from October 17, 2017, FEV1 has decreased from 0.67 L to 0.35 L, 48% change.  FVC has decreased from 1.44 L to 1.03 L, 20% change.  Diffusion capacity has remained stable.  Total lung capacity has decreased from 9.19 L to 1.14 L, 88% change.  Residual volume has decreased from 7.74 L to 0.05 L, 100% change with Conclusion: Low FEV1/FVC with low FEV1 and FVC with low lung volumes and low diffusion capacity.  Suggest mixed obstructive and restrictive airway disease.  Even though as there is significant difference in spirometry and lung volumes over time, likely related to her inability to function during the test. Please correlate clinically.    CT Head Without Contrast    Result Date: 7/13/2022  Narrative: PROCEDURE: CT HEAD WO CONTRAST  COMPARISON:  [facility], CT, HEAD W/O CONTRAST, 1/09/2003, 6:35. INDICATIONS: Syncope/presyncope, cerebrovascular cause suspected  PROTOCOL:   Standard imaging protocol performed    RADIATION:   DLP: 954.1mGy*cm   MA and/or KV was adjusted to minimize radiation dose.     TECHNIQUE: After obtaining the patient's consent, CT images were  obtained without non-ionic intravenous contrast material.  FINDINGS:  There is enlargement of the sulci, fissures, ventricles, and basal cisterns indicating volume loss secondary to cerebral atrophy.  There are areas of decreased density throughout the white matter tracts and subcortical white matter felt to represent chronic microvascular ischemia.  There may be an area of encephalomalacia in the left frontal lobe from old infarct as well.  There is no acute hemorrhage, midline shift, or suspicious extra-axial fluid collections.  There is thinning of the lenses suggestive of previous cataract surgery.  There is mucosal thickening in the right ethmoid air cells indicating chronic sinus disease.  The mastoid sinuses are clear.  There are atherosclerotic vascular calcifications in the cavernous carotid arteries.      Impression:   1. Volume loss secondary to cerebral atrophy. 2. Chronic ischemic changes. 3. No acute findings.     MAGEN FOLEY MD       Electronically Signed and Approved By: MAGEN FOLEY MD on 7/13/2022 at 13:28             XR Chest 1 View    Result Date: 7/13/2022  Narrative: PROCEDURE: XR CHEST 1 VW  COMPARISON: [facility], , XR CHEST 1 VW, 6/12/2022, 17:03.  INDICATIONS: syncope  FINDINGS:  The heart is enlarged.  The patient has underlying emphysema and scarring.  Scarring is most pronounced in the right upper lobe and right lung base.  There may be a tiny right pleural effusion.  The left pleural space is clear.  The pulmonary vascular markings are normal.  There are chronic age-related changes involving the bony thorax and thoracic aorta.      Impression:   1. Cardiomegaly. 2. Emphysema and chronic scarring. 3. Questionable tiny right pleural effusion.       MAGEN FOLEY MD       Electronically Signed and Approved By: MAGEN FOLEY MD on 7/13/2022 at 12:43               Medications   sodium chloride 0.9 % flush 10 mL (has no administration in time range)   sodium chloride 0.9 % bolus 500 mL  (has no administration in time range)   sodium chloride 0.9 % bolus 500 mL (500 mL Intravenous New Bag 7/13/22 1351)         Progress Notes:    13:30 Device Vitals AT   Device Data   BP: 102/53 (Device Time: 13:30:00)   Noninvasive MAP (mmHg): 69 (Device Time: 13:30:00)     Orthostatic Blood Pressure   BP: 95/64   Heart Rate: 74     Orthostatic Blood Pressure   BP: 85/63 Abnormal    Heart Rate: 72   Patient Position: Standing     1435 Patient rechecked I have personally reviewed all radiology findings, incidental and otherwise, with the patient and made patient aware of what needs to be followed up with PCP.    I discussed with the patient indications of admission including lab results and ED imaging interpretation.  The patient understands and agrees with the plan of admission.  All questions and concerns regarding diagnosis or ED results are answered at this time.        Vitals:    07/13/22 1415 07/13/22 1430 07/13/22 1432 07/13/22 1445   BP: 115/59 95/42     BP Location:       Patient Position:       Pulse:  57  55   Resp:       Temp:       TempSrc:       SpO2:   100% 100%   Weight:       Height:               Final diagnoses:   Dehydration   Hypotension, unspecified hypotension type   Hyperkalemia   Near syncope   Headache, unspecified headache type       Prescriptions:        Medication List      ASK your doctor about these medications    albuterol sulfate  (90 Base) MCG/ACT inhaler  Commonly known as: PROVENTIL HFA;VENTOLIN HFA;PROAIR HFA  INHALE 2 PUFFS BY MOUTH EVERY 4 TO 6 HOURS AS NEEDED FOR DIFFICULT BREATHING     alendronate 70 MG tablet  Commonly known as: FOSAMAX  Take 1 tablet by mouth 1 (One) Time Per Week.     amLODIPine-benazepril 5-20 MG per capsule  Commonly known as: LOTREL 5-20  TAKE 1 CAPSULE BY MOUTH EVERY DAY     aspirin 81 MG chewable tablet     atorvastatin 80 MG tablet  Commonly known as: LIPITOR  Take 1 tablet by mouth Daily.     azithromycin 250 MG tablet  Commonly known as:  ZITHROMAX  Take 2 by mouth today then 1 daily for 4 days     budesonide 0.5 MG/2ML nebulizer solution  Commonly known as: PULMICORT  Take 2 mL by nebulization 2 (two) times a day.     donepezil 5 MG tablet  Commonly known as: ARICEPT  Take 1 tablet by mouth Every Night.     Eliquis 2.5 MG tablet tablet  Generic drug: apixaban  TAKE 1 TABLET BY MOUTH TWICE DAILY     ferrous sulfate 325 (65 FE) MG EC tablet  Take 1 tablet by mouth 2 (Two) Times a Day.     FLUoxetine 10 MG capsule  Commonly known as: PROzac  TAKE 1 CAPSULE(10 MG) BY MOUTH EVERY DAY     ipratropium-albuterol 0.5-2.5 mg/3 ml nebulizer  Commonly known as: DUO-NEB  Take 3 mL by nebulization 4 (Four) Times a Day for 120 doses.     MAGnesium-Oxide 400 (240 Mg) MG tablet  Generic drug: Magnesium Oxide  TAKE 1 TABLET TWICE DAILY     montelukast 10 MG tablet  Commonly known as: SINGULAIR  TAKE 1 TABLET BY MOUTH AT BEDTIME     potassium chloride 20 mEq/15 mL solution  Commonly known as: KAYCIEL  TAKE 15 ML BY MOUTH TWICE DAILY     spironolactone 25 MG tablet  Commonly known as: ALDACTONE  TAKE 1 TABLET BY MOUTH EVERY DAY     Stiolto Respimat 2.5-2.5 MCG/ACT aerosol solution inhaler  Generic drug: tiotropium bromide-olodaterol  INHALE 2 PUFFS EVERY DAY     vitamin B-12 1000 MCG tablet  Commonly known as: CYANOCOBALAMIN  Take 1 tablet by mouth Daily.     vitamin D 1.25 MG (73584 UT) capsule capsule  Commonly known as: ERGOCALCIFEROL  Take 1 capsule by mouth 1 (One) Time Per Week.                  Consults:   1448 Spoke with Dr. Laureano, hospitalist regarding history, workup, findings, and treatments given in the ED. Discussed concerns.  He agrees to admit the patient.  He has no questions or further recommendations.    MDM  Number of Diagnoses or Management Options     Amount and/or Complexity of Data Reviewed  Clinical lab tests: ordered  Tests in the radiology section of CPT®: ordered  Tests in the medicine section of CPT®: ordered  Review and summarize past  medical records: yes  Independent visualization of images, tracings, or specimens: yes    Risk of Complications, Morbidity, and/or Mortality  Presenting problems: high  Diagnostic procedures: moderate  Management options: moderate    Patient Progress  Patient progress: stable      (R55) Near syncope    (E86.0) Dehydration    (I95.9) Hypotension, unspecified hypotension type    (E87.5) Hyperkalemia    (R51.9) Headache, unspecified headache type      Reviewing your test results and medical records in your chart is not a substitution for discussing these with your health care provider.  Please contact your primary care provider to discuss any questions or concerns you may have regarding these test results.      Part of this note may be an electronic transcription/translation of spoken language to printed text using the Dragon Dictation System.  The electronic translation of spoken language may permit erroneous or at times nonsensical words or phrases to be inadvertently transcribed.  Although I have reviewed the note for such errors some may still exist.             Emily Candelario, APRN  07/13/22 0922

## 2022-07-14 NOTE — THERAPY EVALUATION
Patient Name: Stacey Juarez  : 1931    MRN: [id number]                              Today's Date: 2022       Admit Date: 2022    Visit Dx:     ICD-10-CM ICD-9-CM   1. Near syncope  R55 780.2   2. Dehydration  E86.0 276.51   3. Hypotension, unspecified hypotension type  I95.9 458.9   4. Hyperkalemia  E87.5 276.7   5. Headache, unspecified headache type  R51.9 784.0   6. Decreased activities of daily living (ADL)  Z78.9 V49.89     Patient Active Problem List   Diagnosis   • Alzheimer's disease ([facility])   • Pure hypercholesterolemia   • Essential hypertension   • Hypoxia   • Supplemental oxygen dependent   • Mixed hyperlipidemia   • Paroxysmal atrial fibrillation ([facility])   • Coronary artery disease involving native coronary artery of native heart without angina pectoris   • Syncope     Past Medical History:   Diagnosis Date   • AAA (abdominal aortic aneurysm) ([facility])    • Acute mesenteric ischemia ([facility]) 03/10/2015   • Arthritis    • Benign essential hypertension    • Bladder disorder    • Bladder problem    • Cataract    • Colon disorder    • COPD (chronic obstructive pulmonary disease) ([facility])    • Dementia ([facility])    • Diverticulitis    • Essential hypertension 05/10/2017   • Heart attack ([facility])    • Hemorrhoids    • Limb swelling    • Mixed hyperlipidemia    • Myocardial infarction ([facility])    • Osteoporosis 2019   • Oxygen dependent 2020   • Paroxysmal A-fib ([facility]) 2017   • Respiratory failure ([facility]) 2020   • Tuberculosis     OR POSITIVE PPD TEST   • Vitamin D deficiency 2015     Past Surgical History:   Procedure Laterality Date   • BACK SURGERY     • BLADDER SURGERY      RECONSTRUCTION   • BREAST SURGERY     • CAROTID ENDARTERECTOMY     • COLONOSCOPY     • EYE SURGERY      EYE IMPLANT   • HYSTERECTOMY     • LUMBAR DISC SURGERY     • OTHER SURGICAL HISTORY      JOINT SURGERY UNSPECIFIED      General Information     Row Name 22 1036          OT Time and  Intention    Document Type evaluation  -AV     Mode of Treatment individual therapy;occupational therapy  -AV     Row Name 07/14/22 1036          General Information    Patient Profile Reviewed yes  -AV     Prior Level of Function independent:;ADL's;all household mobility  Sits to bathe (tub without DME).  Stands at sink to groom.  Ambulates without assistive device.  2 L continuous oxygen.  Information questionable as patient with history of dementia.  -AV     Existing Precautions/Restrictions fall;oxygen therapy device and L/min  Aspiration precautions  -AV     Barriers to Rehab none identified  -AV     Row Name 07/14/22 1036          Occupational Profile    Reason for Services/Referral (Occupational Profile) Patient is a 89 year old female admitted to [facility] on July 13, 2022 with syncope.  She is currently in PCU 2/on 2 L supplemental oxygen.   OT consulted due to impaired ADL/transfer independence.  No previous OT services for current condition.  -AV     Row Name 07/14/22 1036          Living Environment    People in Home alone  Patient reports living alone.  Unable to verify in EMR  -AV     Row Name 07/14/22 1036          Cognition    Orientation Status (Cognition) --  Alert, pleasant and cooperative.  History of dementia/questionable ability to retain information.  Follows commands without difficulty.  -AV     Row Name 07/14/22 1036          Safety Issues, Functional Mobility    Impairments Affecting Function (Mobility) balance;cognition;endurance/activity tolerance  -AV           User Key  (r) = Recorded By, (t) = Taken By, (c) = Cosigned By    Initials Name Provider Type    AV Yeyo Schulz, OT Occupational Therapist                 Mobility/ADL's     Row Name 07/14/22 1037          Transfers    Comment, (Transfers) CGA/min assist  -AV     Row Name 07/14/22 1037          Activities of Daily Living    BADL Assessment/Intervention --  (I) feeding with set up.  Min assist grooming.  Min-mod  assist bathing and dressing.  Min assist toilet hygiene (BSC).  -AV           User Key  (r) = Recorded By, (t) = Taken By, (c) = Cosigned By    Initials Name Provider Type    Yeyo Mckinney OT Occupational Therapist               Obj/Interventions     [facility] Name 07/14/22 1038          Sensory Assessment (Somatosensory)    Sensory Assessment (Somatosensory) UE sensation intact  -AV     [facility] Name 07/14/22 1038          Vision Assessment/Intervention    Visual Impairment/Limitations WFL  Reports occasional use of glasses  -AV     [facility] Name 07/14/22 1038          Range of Motion Comprehensive    General Range of Motion bilateral upper extremity ROM WFL  AROM  -AV     [facility] Name 07/14/22 1038          Strength Comprehensive (MMT)    Comment, General Manual Muscle Testing (MMT) Assessment 4 -/5 bilateral upper extremities  -AV     [facility] Name 07/14/22 1038          Motor Skills    Motor Skills coordination;functional endurance  -AV     Coordination WFL  Right dominant  -AV     Functional Endurance Poor plus  -AV     [facility] Name 07/14/22 1038          Balance    Comment, Balance CGA/min assist  -AV           User Key  (r) = Recorded By, (t) = Taken By, (c) = Cosigned By    Initials Name Provider Type    Yeyo Mckinney OT Occupational Therapist               Goals/Plan     [facility] Name 07/14/22 1039          Transfer Goal 1 (OT)    Activity/Assistive Device (Transfer Goal 1, OT) transfers, all;walker, rolling  -AV     [place] Level/Cues Needed (Transfer Goal 1, OT) supervision required  -AV     Time Frame (Transfer Goal 1, OT) long term goal (LTG);10 days  -AV     [facility] Name 07/14/22 1039          Bathing Goal 1 (OT)    Activity/Device (Bathing Goal 1, OT) bathing skills, all;tub bench  -AV     [place] Level/Cues Needed (Bathing Goal 1, OT) supervision required;set-up required;verbal cues required  -AV     Time Frame (Bathing Goal 1, OT) long term goal (LTG);10 days  -AV     [facility] Name 07/14/22 1039          Dressing Goal 1  (OT)    Activity/Device (Dressing Goal 1, OT) dressing skills, all  -AV     [place]/Cues Needed (Dressing Goal 1, OT) supervision required;set-up required;verbal cues required  -AV     Time Frame (Dressing Goal 1, OT) long term goal (LTG);10 days  -AV     Row Name 07/14/22 1039          Toileting Goal 1 (OT)    Activity/Device (Toileting Goal 1, OT) toileting skills, all;raised toilet seat  -AV     [place] Level/Cues Needed (Toileting Goal 1, OT) supervision required;set-up required;verbal cues required  -AV     Time Frame (Toileting Goal 1, OT) long term goal (LTG);10 days  -AV     Row Name 07/14/22 1039          Grooming Goal 1 (OT)    Activity/Device (Grooming Goal 1, OT) grooming skills, all  -AV     [place] (Grooming Goal 1, OT) supervision required;set-up required;verbal cues required  -AV     Time Frame (Grooming Goal 1, OT) long term goal (LTG);10 days  -AV     Row Name 07/14/22 1039          Strength Goal 1 (OT)    Strength Goal 1 (OT) Patient will demonstrate 4/5 bilateral upper extremities to increase ADL and transfer independence.  -AV     Time Frame (Strength Goal 1, OT) long term goal (LTG);10 days  -AV     Row Name 07/14/22 1039          Problem Specific Goal 1 (OT)    Problem Specific Goal 1 (OT) Patient will demonstrate fair endurance/activity tolerance needed to support ADLs.  -AV     Time Frame (Problem Specific Goal 1, OT) long term goal (LTG);10 days  -AV     Row Name 07/14/22 1039          Therapy Assessment/Plan (OT)    Planned Therapy Interventions (OT) activity tolerance training;BADL retraining;functional balance retraining;occupation/activity based interventions;patient/caregiver education/training;transfer/mobility retraining;strengthening exercise  -AV           User Key  (r) = Recorded By, (t) = Taken By, (c) = Cosigned By    Initials Name Provider Type    Yeyo Mckinney, OT Occupational Therapist               Clinical Impression     Row Name 07/14/22 1038           Pain Assessment    Additional Documentation Pain Scale: FACES Pre/Post-Treatment (Group)  -AV     Row Name 07/14/22 1038          Pain Scale: FACES Pre/Post-Treatment    Pain: FACES Scale, Pretreatment 0-->no hurt  -AV     Posttreatment Pain Rating 0-->no hurt  -AV     Row Name 07/14/22 1038          Plan of Care Review    Plan of Care Reviewed With patient  -AV     Progress no change  First session for evaluation  -AV     Outcome Evaluation Patient presents with limitations of balance, strength, endurance/activity tolerance and questionable safety awareness which impede her ability to perform ADL and transfers as prior.  The skills of a therapist will be required to safely and effectively implement treatment plan to restore maximal level of function.  -AV     Row Name 07/14/22 1038          Therapy Assessment/Plan (OT)    Patient/Family Therapy Goal Statement (OT) Regain independence  -AV     Rehab Potential (OT) good, to achieve stated therapy goals  -AV     Criteria for Skilled Therapeutic Interventions Met (OT) yes;meets criteria;skilled treatment is necessary  -AV     Row Name 07/14/22 1038          Therapy Plan Review/Discharge Plan (OT)    Equipment Needs Upon Discharge (OT) walker, rolling;tub bench;commode chair  -AV     Anticipated Discharge Disposition (OT) skilled nursing facility;sub acute care setting  -AV     Row Name 07/14/22 1038          Vital Signs    O2 Delivery Pre Treatment nasal cannula  2  -AV     O2 Delivery Intra Treatment nasal cannula  2  -AV     O2 Delivery Post Treatment nasal cannula  2  -AV           User Key  (r) = Recorded By, (t) = Taken By, (c) = Cosigned By    Initials Name Provider Type    AV Yeyo Schulz, OT Occupational Therapist               Outcome Measures     Row Name 07/14/22 1040          How much help from another is currently needed...    Putting on and taking off regular lower body clothing? 2  -AV     Bathing (including washing, rinsing, and drying) 2  -AV      Toileting (which includes using toilet bed pan or urinal) 3  -AV     Putting on and taking off regular upper body clothing 3  -AV     Taking care of personal grooming (such as brushing teeth) 3  -AV     Eating meals 4  -AV     AM-PAC 6 Clicks Score (OT) 17  -AV     Row Name 07/14/22 1040          Functional Assessment    Outcome Measure Options AM-PAC 6 Clicks Daily Activity (OT);Optimal Instrument  -AV     Row Name 07/14/22 1040          Optimal Instrument    Optimal Instrument Optimal - 3  -AV     Bending/Stooping 2  -AV     Standing 2  -AV     Reaching 1  -AV     From the list, choose the 3 activities you would most like to be able to do without any difficulty Bending/stooping;Standing;Reaching  -AV     Total Score Optimal - 3 5  -AV           User Key  (r) = Recorded By, (t) = Taken By, (c) = Cosigned By    Initials Name Provider Type    Yeyo Mckinney OT Occupational Therapist                Occupational Therapy Education                 Title: PT OT SLP Therapies (In Progress)     Topic: Occupational Therapy (In Progress)     Point: ADL training (Done)     Description:   Instruct learner(s) on proper safety adaptation and remediation techniques during self care or transfers.   Instruct in proper use of assistive devices.              Learning Progress Summary           Patient Acceptance, E, VU by AV at 7/14/2022 1041                   Point: Home exercise program (Not Started)     Description:   Instruct learner(s) on appropriate technique for monitoring, assisting and/or progressing therapeutic exercises/activities.              Learner Progress:  Not documented in this visit.          Point: Precautions (Not Started)     Description:   Instruct learner(s) on prescribed precautions during self-care and functional transfers.              Learner Progress:  Not documented in this visit.          Point: Body mechanics (Not Started)     Description:   Instruct learner(s) on proper positioning and spine  alignment during self-care, functional mobility activities and/or exercises.              Learner Progress:  Not documented in this visit.                      User Key     Initials Effective Dates Name Provider Type Discipline    AV 06/16/21 -  Yeyo Schulz OT Occupational Therapist OT              OT Recommendation and Plan  Planned Therapy Interventions (OT): activity tolerance training, BADL retraining, functional balance retraining, occupation/activity based interventions, patient/caregiver education/training, transfer/mobility retraining, strengthening exercise  Plan of Care Review  Plan of Care Reviewed With: patient  Progress: no change (First session for evaluation)  Outcome Evaluation: Patient presents with limitations of balance, strength, endurance/activity tolerance and questionable safety awareness which impede her ability to perform ADL and transfers as prior.  The skills of a therapist will be required to safely and effectively implement treatment plan to restore maximal level of function.     Time Calculation:    Time Calculation- OT     Row Name 07/14/22 1042             Time Calculation- OT    OT Received On 07/14/22  -AV      OT Goal Re-Cert Due Date 07/23/22  -AV              Untimed Charges    OT Eval/Re-eval Minutes 35  -AV              Total Minutes    Untimed Charges Total Minutes 35  -AV       Total Minutes 35  -AV            User Key  (r) = Recorded By, (t) = Taken By, (c) = Cosigned By    Initials Name Provider Type    AV Yeyo Schulz OT Occupational Therapist              Therapy Charges for Today     Code Description Service Date Service Provider Modifiers Qty    01648219542 HC OT EVAL MOD COMPLEXITY 3 7/14/2022 Yeyo Schulz OT GO 1               Yeyo Schulz OT  7/14/2022

## 2022-07-14 NOTE — CONSULTS
[facility]   Cardiology Consult Note    Patient Name: Stacey Juarez  : 1931  MRN: [id number]  Primary Care Physician:  Jered Hewitt PA  Referring Physician: Gabriel Laureano MD    Date of admission: 2022    Subjective   Subjective     Reason for Consultation : Syncope, orthostatic hypotension    Chief Complaint : Syncope    HPI:  Stacey Juarez is a 89 y.o. female with paroxysmal atrial fibrillation, previous myocardial infarction hypertension, hyperlipidemia, COPD on home oxygen and tobacco use.  She was admitted to the hospital yesterday because of loss of consciousness.  She was actually going to cardiology clinic appointment with her daughter and while in the car she appeared very weak and fatigued and subsequently lost consciousness.  She was not responsive or answering questions and per daughter episode lasted for more than 10 minutes.  In the ER she was noted to be in acute on chronic kidney disease with hyperkalemia.  Blood pressure was low and she was orthostatic as well.  She was started on IV fluid hydration.    This morning patient appears comfortable.  She reports feeling better.  Denies having any weakness.  Denies any chest pain.  Shortness of breath is at her baseline.  She is taking all the medicines as prescribed.  The dose of potassium chloride was decreased recently due to hyperkalemia detected on routine labs.    Review of Systems   All systems were reviewed and negative except for fatigue and weakness.  Negative for chest pain or palpitations.    Personal History     Past Medical History:   Diagnosis Date   • AAA (abdominal aortic aneurysm) ([facility])    • Acute mesenteric ischemia ([facility]) 03/10/2015   • Arthritis    • Benign essential hypertension    • Bladder disorder    • Bladder problem    • Cataract    • Colon disorder    • COPD (chronic obstructive pulmonary disease) ([facility])    • Dementia ([facility])    • Diverticulitis    • Essential hypertension 05/10/2017   • Heart attack ([facility]) 1986    • Hemorrhoids    • Limb swelling    • Mixed hyperlipidemia    • Myocardial infarction ([facility]) 1986   • Osteoporosis 06/04/2019   • Oxygen dependent 03/24/2020   • Paroxysmal A-fib ([facility]) 08/02/2017   • Respiratory failure ([facility]) 12/17/2020   • Tuberculosis     OR POSITIVE PPD TEST   • Vitamin D deficiency 05/08/2015          Family History: family history includes Arthritis in her mother; Heart disease in her father and mother. Otherwise pertinent FHx was reviewed and not pertinent to current issue.    Social History:  reports that she quit smoking about 5 years ago. Her smoking use included cigarettes. She started smoking about 61 years ago. She smoked 1.00 pack per day. She has never used smokeless tobacco. She reports that she does not drink alcohol and does not use drugs.    Home Medications:  FLUoxetine, Magnesium Oxide, albuterol sulfate HFA, alendronate, amLODIPine-benazepril, apixaban, aspirin, atorvastatin, budesonide, donepezil, ferrous sulfate, ipratropium-albuterol, montelukast, potassium chloride, spironolactone, tiotropium bromide-olodaterol, vitamin B-12, and vitamin D    Allergies:  Allergies   Allergen Reactions   • Penicillins Anaphylaxis       Objective    Objective     Vitals:   Temp:  [97.5 °F (36.4 °C)-98.2 °F (36.8 °C)] 97.9 °F (36.6 °C)  Heart Rate:  [55-87] 61  Resp:  [16-18] 16  BP: ()/(42-71) 128/52  Flow (L/min):  [2] 2      Physical Exam:   Constitutional: Awake, alert, No acute distress    Eyes: PERRLA, sclerae anicteric, no conjunctival injection   HENT: NCAT, mucous membranes moist   Neck: Supple, no thyromegaly, no lymphadenopathy, trachea midline   Respiratory: Clear to auscultation bilaterally, nonlabored respirations    Cardiovascular: RRR, no murmurs, rubs, or gallops, palpable pedal pulses bilaterally   Gastrointestinal: Positive bowel sounds, soft, nontender, nondistended   Musculoskeletal: No bilateral ankle edema, no clubbing or cyanosis to extremities   Psychiatric:  Appropriate affect, cooperative   Neurologic: Oriented x 3, strength symmetric in all extremities, Cranial Nerves grossly intact to confrontation, speech clear   Skin: No rashes     Result Review    Result Review:  I have personally reviewed the results from the time of this admission to 7/14/2022 11:19 EDT and agree with these findings:  [x]  Laboratory  []  Microbiology  [x]  Radiology  [x]  EKG/Telemetry   [x]  Cardiology/Vascular   []  Pathology  [x]  Old records  []  Other:  Most notable findings include:     CMP    CMP 6/12/22 7/13/22 7/14/22   Glucose 100 (A) 121 (A) 89   BUN 24 (A) 41 (A) 34 (A)   Creatinine 2.01 (A) 2.32 (A) 1.61 (A)   Sodium 140 135 (A) 136   Potassium 4.6 5.3 (A) 4.8   Chloride 104 100 103   Calcium 9.9 (A) 10.5 (A) 9.9 (A)   Albumin 4.40 4.10    Total Bilirubin 0.3 0.5    Alkaline Phosphatase 117 97    AST (SGOT) 14 12    ALT (SGPT) <5 <5    (A) Abnormal value             CBC    CBC 6/12/22 7/13/22 7/14/22   WBC 5.68 6.14 5.32   RBC 3.92 3.78 3.48 (A)   Hemoglobin 10.1 (A) 10.0 (A) 9.1 (A)   Hematocrit 33.7 (A) 33.4 (A) 30.9 (A)   MCV 86.0 88.4 88.8   MCH 25.8 (A) 26.5 (A) 26.1 (A)   MCHC 30.0 (A) 29.9 (A) 29.4 (A)   RDW 14.4 15.9 (A) 15.5 (A)   Platelets 258 235 207   (A) Abnormal value             Lab Results   Component Value Date    TROPONINT <0.010 07/13/2022         EKG at time of admission showed sinus rhythm, normal axis, old inferior wall infarct.    Assessment & Plan   Assessment / Plan     Brief Patient Summary:  Stacey Juarez is a 89 y.o. female with paroxysmal atrial fibrillation, coronary disease, hypertension, tobacco use and chronic kidney disease.  She was admitted after having a syncopal episode.  Further work-up showed acute kidney injury on CKD and hyperkalemia along with orthostatic hypotension.    Active Hospital Problems:  Active Hospital Problems    Diagnosis    • Syncope      Syncope : Likely related orthostatic hypotension, PRIYANKA on CKD from dehydration.  She  takes diuretics, antihypertensives and potassium at home.  Telemetry shows no arrhythmias    PRIYANKA on CKD : Likely prerenal, improving with IV fluids    Paroxysmal atrial fibrillation : Currently normal sinus rhythm    Plan:     Agree with gentle IV fluid hydration now  We will do echocardiogram to reassess LV function  Continue Eliquis for anticoagulation    Holding spironolactone and antihypertensive medications today.  Amlodipine may be restarted tomorrow if blood pressure remains elevated.  Recommend to discontinue spironolactone permanently    PT/OT  Further management plans based on clinical course      Electronically signed by Waqas Ramírez MD, 07/14/22, 11:19 AM EDT.

## 2022-07-14 NOTE — PLAN OF CARE
Goal Outcome Evaluation:               Patient received from ED at beginning on shift. Patient has intermittent confusion but has rested most of night. Will continue to monitor. No other issues reported.

## 2022-07-14 NOTE — CONSULTS
"Nutrition Services    Patient Name: Stacey Juarez  YOB: 1931  MRN: [id number]  Admission date: 7/13/2022      CLINICAL NUTRITION ASSESSMENT      Reason for Assessment  Malnutrition Severity Assessment, Physician consult     H&P:    Past Medical History:   Diagnosis Date   • AAA (abdominal aortic aneurysm) ([facility])    • Acute mesenteric ischemia ([facility]) 03/10/2015   • Arthritis    • Benign essential hypertension    • Bladder disorder    • Bladder problem    • Cataract    • Colon disorder    • COPD (chronic obstructive pulmonary disease) ([facility])    • Dementia ([facility])    • Diverticulitis    • Essential hypertension 05/10/2017   • Heart attack ([facility]) 1986   • Hemorrhoids    • Limb swelling    • Mixed hyperlipidemia    • Myocardial infarction ([facility]) 1986   • Osteoporosis 06/04/2019   • Oxygen dependent 03/24/2020   • Paroxysmal A-fib ([facility]) 08/02/2017   • Respiratory failure ([facility]) 12/17/2020   • Tuberculosis     OR POSITIVE PPD TEST   • Vitamin D deficiency 05/08/2015        Current Problems:   Active Hospital Problems    Diagnosis    • Syncope         Nutrition/Diet History         Narrative     Patient admitted with syncope, acute on chronic renal failure.  Reports her appetite is good and she has been eating well.  No significant weight loss noted.  No nutrition intervention indicated at this time.       Anthropometrics        Current Height, Weight Height: 160 cm (63\")  Weight: 66.8 kg (147 lb 4.3 oz)   Current BMI Body mass index is 26.09 kg/m².       Weight Hx  Wt Readings from Last 30 Encounters:   07/14/22 0623 66.8 kg (147 lb 4.3 oz)   07/13/22 1230 66 kg (145 lb 8.1 oz)   06/12/22 1608 66.1 kg (145 lb 11.6 oz)   06/09/22 1031 66.2 kg (146 lb)   05/27/22 1233 66.3 kg (146 lb 3.2 oz)   01/11/22 1138 67.1 kg (148 lb)   08/20/21 1134 63.5 kg (140 lb)   06/30/21 1109 62.1 kg (136 lb 12.8 oz)   06/16/21 1306 55.8 kg (123 lb)   04/30/21 0000 60.3 kg (133 lb)   04/19/21 0000 60.3 kg (133 lb)   12/17/20 0000 59.5 kg " (131 lb 2 oz)   10/30/20 0000 61.2 kg (135 lb)   10/16/20 0000 60.8 kg (134 lb)   03/16/20 0000 60.3 kg (133 lb)   02/06/20 1053 63.6 kg (140 lb 3 oz)   01/13/20 0000 63 kg (139 lb)   12/09/19 0000 64 kg (141 lb)   12/05/19 0000 64.5 kg (142 lb 2 oz)   10/18/19 0000 63 kg (139 lb)   08/29/19 1256 63.5 kg (140 lb)   08/19/19 0000 63.2 kg (139 lb 4 oz)   08/06/19 1435 63.5 kg (140 lb)   06/04/19 0000 62.6 kg (138 lb)   04/19/19 0000 63.5 kg (140 lb)   01/29/19 1351 60.9 kg (134 lb 4 oz)   12/04/18 0000 62.1 kg (137 lb)   10/19/18 0000 61.7 kg (136 lb)   10/17/18 1333 61 kg (134 lb 7 oz)   08/10/18 0000 61.2 kg (135 lb)   06/27/18 1011 59.1 kg (130 lb 4 oz)            Wt Change Observation No significant changes.       Estimated/Assessed Needs       Energy Requirements    EST Needs (kcal/day) 1670-2004       Protein Requirements    EST Daily Needs (g/day) 67-80       Fluid Requirements     Estimated Needs (mL/day) 1670-2004     Labs/Medications         Pertinent Labs Reviewed.   Results from last 7 days   Lab Units 07/14/22  0502 07/13/22  1049   SODIUM mmol/L 136 135*   POTASSIUM mmol/L 4.8 5.3*   CHLORIDE mmol/L 103 100   CO2 mmol/L 25.8 24.3   BUN mg/dL 34* 41*   CREATININE mg/dL 1.61* 2.32*   CALCIUM mg/dL 9.9* 10.5*   BILIRUBIN mg/dL  --  0.5   ALK PHOS U/L  --  97   ALT (SGPT) U/L  --  <5   AST (SGOT) U/L  --  12   GLUCOSE mg/dL 89 121*     Results from last 7 days   Lab Units 07/14/22  0502 07/13/22  1049   MAGNESIUM mg/dL 2.2 2.6*   PHOSPHORUS mg/dL 3.2  --    HEMOGLOBIN g/dL 9.1* 10.0*   HEMATOCRIT % 30.9* 33.4*     No results found for: COVID19  No results found for: HGBA1C      Pertinent Medications Reviewed.     Current Nutrition Orders & Evaluation of Intake       Oral Nutrition     Current PO Diet Diet Regular   Supplement No active supplement orders       Malnutrition Severity Assessment         unremarkable       Nutrition Diagnosis         Nutrition Dx Problem 1 No nutrition diagnosis at this time        Nutrition Intervention         No nutrition intervention indicated     Medical Nutrition Therapy/Nutrition Education          Learner     Readiness Patient  Acceptance     Method     Response Explanation  Verbalizes understanding     Monitor/Evaluation        Monitor Per protocol, PO intake, Weight       Nutrition Discharge Plan         No nutrition discharge needs identified at this time       Electronically signed by:  Fior Eugene RD  07/14/22 15:00 EDT

## 2022-07-14 NOTE — PROGRESS NOTES
[facility]   Hospitalist Progress Note  Date: 2022  Patient Name: Stacey Juarez  : 1931  MRN: [id number]  Date of admission: 2022  Consultants:   -Cardiology: Dr. Waqas Ramírez    Subjective   Subjective     Chief Complaint: Syncope    Summary:   Stacey Juarez is a 89 y.o. female with COPD, hypertension, chronic hypoxic respiratory failure, CAD, paroxysmal atrial fibrillation on Eliquis who presented to the ED after syncopal episode while sitting in the car.  Orthostatic vital signs positive in ED.  Labs demonstrated elevated creatinine and hyperkalemia.  Hospitalist service contacted for further evaluation management.  Cardiology consulted to assist in care.  Gentle IV fluid resuscitation started and patient's home antihypertensive medications held.    Interval Followup:   No acute events overnight.  Patient states that she feels okay today.  Feels tired and weak.  Denies any chest pain, shortness breath, abdominal pain, nausea or vomiting.  Nursing with no additional acute issues to report.    Review of Systems   All systems reviewed and negative unless stated otherwise under subjective.    Objective   Objective     Vitals:   Temp:  [97.5 °F (36.4 °C)-98.2 °F (36.8 °C)] 97.9 °F (36.6 °C)  Heart Rate:  [55-87] 61  Resp:  [16-18] 16  BP: ()/(42-71) 128/52  Flow (L/min):  [2] 2  Physical Exam   Gen: No acute distress, Conversant, Pleasant, lying in bed resting comfortably, easily arousable  HEENT: MMM, Atraumatic  Neck: Supple, Trachea midline  Resp: CTAB, No w/r/r, good aeration, equal chest rise bilaterally, no respiratory distress appreciated  Card: RRR, No m/r/g  Abd: Soft, Nontender, Nondistended, + bowel sounds  Ext: No cyanosis, No clubbing  Neuro: CN II-XII grossly intact, No focal deficits appreciated  Psych: AAO x 3, Normal mood, Normal affect    Result Review    Result Review:  I have personally reviewed the results from the time of this admission to 2022 12:28 EDT and  agree with these findings:  [x]  Laboratory: Creatinine improved.  Electrolytes within normal limits.  WC, hemoglobin and platelets stable.  []  Microbiology:   []  Radiology:   [x]  EKG/Telemetry: Paroxysmal atrial fibrillation.  Rate controlled.  No acute events.  []  Cardiology/Vascular:    []  Pathology:  []  Old records:  []  Other:    Assessment & Plan   Assessment / Plan     Assessment:  Syncope  Acute on chronic renal failure (baseline creatinine around 1.3)  Hyperkalemia, improved  Orthostatic hypotension  Paroxysmal atrial fibrillation on chronic anticoagulation with Eliquis  Essential hypertension  Alzheimer's dementia  History of coronary artery disease  Hyperlipidemia  History of essential hypertension  COPD, not acutely exacerbated  Chronic hypoxic respiratory failure    Plan:  -Cardiology consulted and following, appreciate assistance and recommendations in the care of this patient.  -Echocardiogram ordered, follow-up results  -Continue gentle IV fluid resuscitation  -Obtain orthostatic vital signs every shift  -Continue to hold home antihypertensive medications.  Plan to start home amlodipine tomorrow  -Will not restart home spironolactone and will discontinue medication at time of discharge  -Continue appropriate home medications  -PT/OT consulted  -Will monitor electrolytes and renal function with BMP and magnesium level in the AM  -Will monitor WBC and Hgb with CBC in the AM  -Clinical course will dictate further management     DVT Prophylaxis: Apixaban  Diet: Regular  Dispo: PT/OT  Code Status: Full code     Personally reviewed patients labs and imaging, discussed with patient and nurse at bedside. Discussed case with the following consultants: Cardiology.     Part of this note may be an electronic transcription/translation of spoken language to printed text using the Dragon dictation system.    DVT prophylaxis:  Medical DVT prophylaxis orders are present.    CODE STATUS:   Code Status (Patient  has no pulse and is not breathing): CPR (Attempt to Resuscitate)  Medical Interventions (Patient has pulse or is breathing): Full Support        Electronically signed by Gabriel Laureano MD, 07/14/22, 12:28 PM EDT.

## 2022-07-14 NOTE — PLAN OF CARE
Goal Outcome Evaluation:  Plan of Care Reviewed With: patient        Progress: no change (First session for evaluation)  Outcome Evaluation: Patient presents with limitations of balance, strength, endurance/activity tolerance and questionable safety awareness which impede her ability to perform ADL and transfers as prior.  The skills of a therapist will be required to safely and effectively implement treatment plan to restore maximal level of function.

## 2022-07-14 NOTE — PLAN OF CARE
Goal Outcome Evaluation:   VSS. Still receiving IVF. No changes. Patient doesn't report any pain or not feeling well. Will continue to monitor.

## 2022-07-14 NOTE — PLAN OF CARE
Goal Outcome Evaluation:  Plan of Care Reviewed With: patient           Outcome Evaluation: Patient presents with decreased strength, transfers, and functional mobility.  She will benefit from skilled PT services to address these deficits.  She will also benefit from placement in a rehab facility to maximize independence with functional mobility.

## 2022-07-15 NOTE — PLAN OF CARE
Goal Outcome Evaluation:   VSS. Patient intermittently confused. No changes. Will continue to monitor.

## 2022-07-15 NOTE — PROGRESS NOTES
[facility]   Hospitalist Progress Note  Date: 7/15/2022  Patient Name: Stacey Juarez  : 1931  MRN: [id number]  Date of admission: 2022  Consultants:   -Cardiology: Dr. Waqas Ramírez    Subjective   Subjective     Chief Complaint: Syncope    Summary:   Stacey Juarze is a 89 y.o. female with COPD, hypertension, chronic hypoxic respiratory failure, CAD, paroxysmal atrial fibrillation on Eliquis who presented to the ED after syncopal episode while sitting in the car.  Orthostatic vital signs positive in ED.  Labs demonstrated elevated creatinine and hyperkalemia.  Hospitalist service contacted for further evaluation management.  Cardiology consulted to assist in care.  Gentle IV fluid resuscitation started and patient's home antihypertensive medications held.  Creatinine improved with IV fluids.    Interval Followup:   No acute events overnight.  Patient no longer orthostatic.  She denied any chest pain, shortness of breath, abdominal pain, nausea or vomiting.  Patient evaluated by PT/OT and SNF vs rehab recommended.  Nursing with no additional acute issues to report.    Review of Systems   All systems reviewed and negative unless stated otherwise under subjective.    Objective   Objective     Vitals:   Temp:  [97.7 °F (36.5 °C)-98.1 °F (36.7 °C)] 98.1 °F (36.7 °C)  Heart Rate:  [57-99] 70  Resp:  [15-16] 15  BP: (112-142)/(52-72) 138/64  Flow (L/min):  [2] 2  Physical Exam   Gen: No acute distress, Conversant, Pleasant, resting comfortably bed, easily arousable  HEENT: MMM, Atraumatic  Neck: Supple, Trachea midline  Resp: CTAB, No w/r/r, good aeration, normal respiratory effort, equal chest rise bilaterally  Card: RRR, No m/r/g  Abd: Soft, Nontender, Nondistended, + bowel sounds  Ext: No cyanosis, No clubbing  Neuro: CN II-XII grossly intact, No focal deficits appreciated  Psych: AAO x 2 (self, place), Normal mood, Normal affect    Result Review    Result Review:  I have personally reviewed the  results from the time of this admission to 7/15/2022 08:48 EDT and agree with these findings:  [x]  Laboratory: Creatinine continues to improve.  Magnesium low normal.  Other electrolytes stable.  WBC, hemoglobin and platelets stable.  []  Microbiology:   []  Radiology:   [x]  EKG/Telemetry: NSVT.  Episodes of tachycardia and bradycardia appreciated on telemetry.  [x]  Cardiology/Vascular: Echo completed.  EF: 61-65%.  Grade 1 diastolic dysfunction noted.  []  Pathology:  []  Old records:  []  Other:    Assessment & Plan   Assessment / Plan     Assessment:  Syncope  Acute on chronic renal failure (baseline creatinine around 1.3), improved  Hyperkalemia, improved  Orthostatic hypotension, improved  Paroxysmal atrial fibrillation on chronic anticoagulation with Eliquis  Essential hypertension  Alzheimer's dementia  History of coronary artery disease  Hyperlipidemia  History of essential hypertension  COPD, not acutely exacerbated  Chronic hypoxic respiratory failure  Chronic diastolic heart failure, not acutely exacerbated    Plan:  -Cardiology consulted and following, appreciate assistance and recommendations in the care of this patient.  -Echocardiogram reviewed as noted above  -Stop IV fluids  -Continue to monitor orthostatic vital signs every shift  -Patient with elevated blood pressure we will start home amlodipine and continue to hold other home antihypertensive medications at this time  -Continue holding home spironolactone and will discontinue this medication at time of discharge  -PT/OT consulted  -Continue appropriate home medications  -Replace magnesium  -Monitor electrolytes closely.  Replace potassium to maintain ~4.0 and Magnesium ~2.0  -Monitor electrolytes and renal function with BMP, phosphorus level and magnesium level in the AM  -Monitor WBC and Hgb with CBC in the AM  -Clinical course will dictate further management     DVT Prophylaxis: Apixaban  Diet: Regular  Dispo: PT/OT consulted; SNF vs Rehab  recommended  Code Status: Full code     Personally reviewed patients labs and imaging, discussed with patient and nurse at bedside. Discussed case with the following consultants: Cardiology.     Part of this note may be an electronic transcription/translation of spoken language to printed text using the Dragon dictation system.    DVT prophylaxis:  Medical DVT prophylaxis orders are present.    CODE STATUS:   Code Status (Patient has no pulse and is not breathing): CPR (Attempt to Resuscitate)  Medical Interventions (Patient has pulse or is breathing): Full Support        Electronically signed by Gabriel Laureano MD, 07/15/22, 8:48 AM EDT.

## 2022-07-15 NOTE — PLAN OF CARE
Goal Outcome Evaluation:   No change in patient. VSS. Continues to receive IV fluids as ordered. Patient remains confused, only alert to self. No complaints or s/s of dizziness or light headedness. Will continue to monitor.

## 2022-07-15 NOTE — PROGRESS NOTES
[facility]     Cardiology Progress Note    Patient Name: Stacey Juarez  : 1931  MRN: [id number]  Primary Care Physician:  Jered Hewitt PA  Date of admission: 2022    Subjective   Subjective     Chief Complaint: Follow-up visit for syncope    Interval HPI:    Patient reports feeling weak and tired.  She is intermittently confused per nursing staff.  She has not ambulated since admission.  Orthostatics were negative last night.  She denies any chest pain or palpitations.  Shortness of breath at her baseline    Review of Systems   All systems were reviewed and negative except for intermittent confusion, weakness and fatigue.  Negative for chest pain, shortness of breath or palpitations.    Objective   Objective     Vitals:   Temp:  [97.7 °F (36.5 °C)-98.1 °F (36.7 °C)] 98.1 °F (36.7 °C)  Heart Rate:  [57-99] 70  Resp:  [15-16] 15  BP: (112-142)/(52-72) 138/64  Flow (L/min):  [2] 2     Physical Exam      General : Alert, awake, no acute distress  CVS : Regular rate and rhythm, no murmur, rubs or gallops  Lungs: Clear to auscultation bilaterally, no crackles or rhonchi  Abdomen: Soft, nontender, bowel sounds heard in all 4 quadrants  Extremities: Warm, well-perfused, no pedal edema    Scheduled Meds:amLODIPine, 5 mg, Oral, Daily  apixaban, 2.5 mg, Oral, BID  arformoterol, 15 mcg, Nebulization, BID - RT  atorvastatin, 80 mg, Oral, Nightly  budesonide, 0.5 mg, Nebulization, BID  donepezil, 5 mg, Oral, Nightly  ferrous sulfate, 325 mg, Oral, Daily With Breakfast  FLUoxetine, 10 mg, Oral, Daily  montelukast, 10 mg, Oral, Daily  sodium chloride, 10 mL, Intravenous, Q12H  vitamin B-12, 1,000 mcg, Oral, Daily         Result Review    Result Review:  I have personally reviewed the results from the time of this admission to 7/15/2022 09:32 EDT and agree with these findings:  [x]  Laboratory  []  Microbiology  [x]  Radiology  [x]  EKG/Telemetry   [x]  Cardiology/Vascular   []  Pathology  []  Old records  []   Other:  Most notable findings include:     CBC    CBC 7/13/22 7/14/22 7/15/22   WBC 6.14 5.32 5.98   RBC 3.78 3.48 (A) 3.46 (A)   Hemoglobin 10.0 (A) 9.1 (A) 9.1 (A)   Hematocrit 33.4 (A) 30.9 (A) 30.4 (A)   MCV 88.4 88.8 87.9   MCH 26.5 (A) 26.1 (A) 26.3 (A)   MCHC 29.9 (A) 29.4 (A) 29.9 (A)   RDW 15.9 (A) 15.5 (A) 15.6 (A)   Platelets 235 207 206   (A) Abnormal value            CMP    CMP 7/13/22 7/14/22 7/15/22   Glucose 121 (A) 89 82   BUN 41 (A) 34 (A) 22   Creatinine 2.32 (A) 1.61 (A) 1.21 (A)   Sodium 135 (A) 136 136   Potassium 5.3 (A) 4.8 4.5   Chloride 100 103 102   Calcium 10.5 (A) 9.9 (A) 9.9 (A)   Albumin 4.10     Total Bilirubin 0.5     Alkaline Phosphatase 97     AST (SGOT) 12     ALT (SGPT) <5     (A) Abnormal value             CARDIAC LABS:      Lab 07/13/22  1049   PROBNP 113.2   TROPONIN T <0.010        Results for orders placed during the hospital encounter of 07/13/22    Adult Transthoracic Echo Complete w/ Color, Spectral and Contrast if necessary per protocol    Interpretation Summary  · Left ventricular ejection fraction appears to be 61 - 65%. Left ventricular systolic function is normal.  · Left ventricular wall thickness is consistent with mild concentric hypertrophy.  · Left ventricular diastolic function is consistent with (grade I) impaired relaxation.  · There is moderate dilation of the ascending aorta measuring 4.2 cm.  · There is mild tricuspid regurgitation. Estimated right ventricular systolic pressure from tricuspid regurgitation is mildly elevated (35-45 mmHg).        Assessment & Plan   Assessment / Plan     Brief Patient Summary:  Stacey Juarez is a 89 y.o. female with  paroxysmal atrial fibrillation, coronary disease, hypertension, tobacco use and chronic kidney disease.  She was admitted after having a syncopal episode.  Further work-up showed acute kidney injury on CKD and hyperkalemia along with orthostatic hypotension.    Active Hospital Problems:  Active Hospital  Problems    Diagnosis    • Syncope      Syncope : Likely related orthostatic hypotension, PRIYANKA on CKD from dehydration. She takes diuretics, antihypertensives and potassium at home.  Telemetry shows no arrhythmias     PRIYANKA on CKD : Likely prerenal, improving with IV fluids     Paroxysmal atrial fibrillation : Currently normal sinus rhythm      Plan:     Amlodipine restarted today, to continue  Home spironolactone and benazepril discontinued  Agree with stopping IV fluids  Continue Eliquis for anticoagulation    PT/OT to continue  We will see as needed while inpatient, follow-up in cardiology clinic 2 weeks after discharge       CODE STATUS:   Code Status (Patient has no pulse and is not breathing): CPR (Attempt to Resuscitate)  Medical Interventions (Patient has pulse or is breathing): Full Support      Electronically signed by Waqas Ramírez MD, 07/15/22, 9:32 AM EDT.

## 2022-07-15 NOTE — SIGNIFICANT NOTE
07/14/22 1045   Coping/Psychosocial   Observed Emotional State calm;cooperative   Verbalized Emotional State hopefulness;relief   Trust Relationship/Rapport empathic listening provided   Involvement in Care interacting with patient   Additional Documentation Spiritual Care (Group)   Spiritual Care   Use of Spiritual Resources non-[employer] use of spiritual care   Spiritual Care Source  initiative   Spiritual Care Follow-Up follow-up, none required as presently assessed   Response to Spiritual Care engaged in conversation;receptive of support   Spiritual Care Interventions supportive conversation provided   Spiritual Care Visit Type initial   Receptivity to Spiritual Care visit welcomed

## 2022-07-16 NOTE — THERAPY TREATMENT NOTE
Acute Care - Physical Therapy Treatment Note   [facility]     Patient Name: Stacey Juarez  : 1931  MRN: [id number]  Today's Date: 2022      Visit Dx:     ICD-10-CM ICD-9-CM   1. Near syncope  R55 780.2   2. Dehydration  E86.0 276.51   3. Hypotension, unspecified hypotension type  I95.9 458.9   4. Hyperkalemia  E87.5 276.7   5. Headache, unspecified headache type  R51.9 784.0   6. Decreased activities of daily living (ADL)  Z78.9 V49.89   7. Difficulty walking  R26.2 719.7     Patient Active Problem List   Diagnosis   • Alzheimer's disease ([facility])   • Pure hypercholesterolemia   • Essential hypertension   • Hypoxia   • Supplemental oxygen dependent   • Mixed hyperlipidemia   • Paroxysmal atrial fibrillation ([facility])   • Coronary artery disease involving native coronary artery of native heart without angina pectoris   • Syncope     Past Medical History:   Diagnosis Date   • AAA (abdominal aortic aneurysm) ([facility])    • Acute mesenteric ischemia ([facility]) 03/10/2015   • Arthritis    • Benign essential hypertension    • Bladder disorder    • Bladder problem    • Cataract    • Colon disorder    • COPD (chronic obstructive pulmonary disease) ([facility])    • Dementia ([facility])    • Diverticulitis    • Essential hypertension 05/10/2017   • Heart attack ([facility])    • Hemorrhoids    • Limb swelling    • Mixed hyperlipidemia    • Myocardial infarction ([facility])    • Osteoporosis 2019   • Oxygen dependent 2020   • Paroxysmal A-fib ([facility]) 2017   • Respiratory failure ([facility]) 2020   • Tuberculosis     OR POSITIVE PPD TEST   • Vitamin D deficiency 2015     Past Surgical History:   Procedure Laterality Date   • BACK SURGERY     • BLADDER SURGERY      RECONSTRUCTION   • BREAST SURGERY     • CAROTID ENDARTERECTOMY     • COLONOSCOPY     • EYE SURGERY      EYE IMPLANT   • HYSTERECTOMY     • LUMBAR DISC SURGERY     • OTHER SURGICAL HISTORY      JOINT SURGERY UNSPECIFIED     PT Assessment (last 12 hours)      PT Evaluation and Treatment     [facility] Name 07/16/22 1100          Physical Therapy Time and Intention    Subjective Information complains of;weakness;fatigue  -     Document Type therapy note (daily note)  -     Mode of Treatment physical therapy;individual therapy  -     Patient Effort fair  -[facility] Name 07/16/22 1100          Pain Scale: FACES Pre/Post-Treatment    Pain: FACES Scale, Pretreatment 0-->no hurt  -     Posttreatment Pain Rating 0-->no hurt  -RH     Row Name 07/16/22 1100          Bed Mobility    Bed Mobility supine-sit  -     Supine-Sit [place] (Bed Mobility) contact guard;minimum assist (75% patient effort)  -     Assistive Device (Bed Mobility) bed rails  -     Comment, (Bed Mobility) Pt maintains sitting with SBA.  -[facility] Name 07/16/22 1100          Transfers    Transfers sit-stand transfer;stand-sit transfer  -     Sit-Stand [place] (Transfers) contact guard  -     Stand-Sit [place] (Transfers) contact guard  -RH     Row Name 07/16/22 1100          Sit-Stand Transfer    Assistive Device (Sit-Stand Transfers) walker, front-wheeled  -RH     Row Name 07/16/22 1100          Stand-Sit Transfer    Assistive Device (Stand-Sit Transfers) walker, front-wheeled  -RH     Row Name 07/16/22 1100          Gait/Stairs (Locomotion)    Gait/Stairs Locomotion gait/ambulation independence;gait/ambulation assistive device;distance ambulated;gait pattern;gait deviations  -     [place] Level (Gait) contact guard  -     Assistive Device (Gait) walker, front-wheeled  -     Distance in Feet (Gait) 5  -     Pattern (Gait) 3-point;step-to  -RH     Deviations/Abnormal Patterns (Gait) base of support, narrow;gait speed decreased;stride length decreased  -[facility] Name 07/16/22 1100          Balance    Dynamic Standing Balance contact guard  -     Position/Device Used, Standing Balance walker, front-wheeled  -RH     Row Name 07/16/22 1100          Vital Signs    O2  Delivery Intra Treatment --  Pt on 2L with nasal cannula.  -     Row Name 07/16/22 1100          Progress Summary (PT)    Progress Toward Functional Goals (PT) progress toward functional goals is fair  -           User Key  (r) = Recorded By, (t) = Taken By, (c) = Cosigned By    Initials Name Provider Type     Uli Wise PTA Physical Therapist Assistant                  PT Recommendation and Plan     Progress Summary (PT)  Progress Toward Functional Goals (PT): progress toward functional goals is fair   Outcome Measures     Row Name 07/16/22 1200 07/14/22 1300          How much help from another person do you currently need...    Turning from your back to your side while in flat bed without using bedrails? 4  -RH 4  -DP     Moving from lying on back to sitting on the side of a flat bed without bedrails? 4  -RH 3  -DP     Moving to and from a bed to a chair (including a wheelchair)? 3  -RH 3  -DP     Standing up from a chair using your arms (e.g., wheelchair, bedside chair)? 3  - 3  -DP     Climbing 3-5 steps with a railing? 3  -RH 3  -DP     To walk in hospital room? 3  -RH 3  -DP     AM-PAC 6 Clicks Score (PT) 20  -RH 19  -DP            Functional Assessment    Outcome Measure Options -- AM-PAC 6 Clicks Basic Mobility (PT)  -DP           User Key  (r) = Recorded By, (t) = Taken By, (c) = Cosigned By    Initials Name Provider Type     Uli Wise PTA Physical Therapist Assistant    Neymar Tellez, PT Physical Therapist                 Time Calculation:         PT G-Codes  Outcome Measure Options: AM-PAC 6 Clicks Basic Mobility (PT)  AM-PAC 6 Clicks Score (PT): 20  AM-PAC 6 Clicks Score (OT): 17    Uli Wise PTA  7/16/2022

## 2022-07-16 NOTE — PLAN OF CARE
Goal Outcome Evaluation:   Transferred to floor. Daughter at bedside. Given prn tylenol

## 2022-07-16 NOTE — PLAN OF CARE
Goal Outcome Evaluation:   Patient has rested well this shift. No complaints of dizziness. Remains confused and only alert to self this shift. Able to stand to bedside commode with standby assist. Patient has been continent of bowel and bladder. Awaiting rehab placement. Will continue to monitor.

## 2022-07-16 NOTE — PLAN OF CARE
Goal Outcome Evaluation:  Plan of Care Reviewed With: patient        Progress: no change  Outcome Evaluation: Patient stable this shift without significant changes. Awaiting discharge placement.

## 2022-07-17 NOTE — PLAN OF CARE
Goal Outcome Evaluation:  Plan of Care Reviewed With: patient        Progress: no change       VSS, NO ACUTE EVENTS AWAIT REHAB PLACEMENT

## 2022-07-17 NOTE — PLAN OF CARE
Goal Outcome Evaluation:  Plan of Care Reviewed With: patient        Progress: improving   VSS no complaints of pain or discomfort. She is looking forward to going to rehab. Awaiting placement

## 2022-07-17 NOTE — THERAPY TREATMENT NOTE
Acute Care - Physical Therapy Treatment Note   [facility]     Patient Name: Stacey Juarez  : 1931  MRN: [id number]  Today's Date: 2022      Visit Dx:     ICD-10-CM ICD-9-CM   1. Near syncope  R55 780.2   2. Dehydration  E86.0 276.51   3. Hypotension, unspecified hypotension type  I95.9 458.9   4. Hyperkalemia  E87.5 276.7   5. Headache, unspecified headache type  R51.9 784.0   6. Decreased activities of daily living (ADL)  Z78.9 V49.89   7. Difficulty walking  R26.2 719.7     Patient Active Problem List   Diagnosis   • Alzheimer's disease ([facility])   • Pure hypercholesterolemia   • Essential hypertension   • Hypoxia   • Supplemental oxygen dependent   • Mixed hyperlipidemia   • Paroxysmal atrial fibrillation ([facility])   • Coronary artery disease involving native coronary artery of native heart without angina pectoris   • Syncope     Past Medical History:   Diagnosis Date   • AAA (abdominal aortic aneurysm) ([facility])    • Acute mesenteric ischemia ([facility]) 03/10/2015   • Arthritis    • Benign essential hypertension    • Bladder disorder    • Bladder problem    • Cataract    • Colon disorder    • COPD (chronic obstructive pulmonary disease) ([facility])    • Dementia ([facility])    • Diverticulitis    • Essential hypertension 05/10/2017   • Heart attack ([facility])    • Hemorrhoids    • Limb swelling    • Mixed hyperlipidemia    • Myocardial infarction ([facility])    • Osteoporosis 2019   • Oxygen dependent 2020   • Paroxysmal A-fib ([facility]) 2017   • Respiratory failure ([facility]) 2020   • Tuberculosis     OR POSITIVE PPD TEST   • Vitamin D deficiency 2015     Past Surgical History:   Procedure Laterality Date   • BACK SURGERY     • BLADDER SURGERY      RECONSTRUCTION   • BREAST SURGERY     • CAROTID ENDARTERECTOMY     • COLONOSCOPY     • EYE SURGERY      EYE IMPLANT   • HYSTERECTOMY     • LUMBAR DISC SURGERY     • OTHER SURGICAL HISTORY      JOINT SURGERY UNSPECIFIED     PT Assessment (last 12 hours)      PT Evaluation and Treatment     Row Name 07/17/22 0940          Physical Therapy Time and Intention    Subjective Information complains of;weakness;fatigue;pain  -DK     Document Type therapy note (daily note)  -DK     Mode of Treatment individual therapy;physical therapy  -DK     Patient Effort good  -DK     Symptoms Noted During/After Treatment fatigue;increased pain  -DK     Row Name 07/17/22 0940          Pain    Pretreatment Pain Rating 2/10  -DK     Posttreatment Pain Rating 3/10  -DK     Pain Location generalized  -DK     Pain Location - back  -DK     Pain Intervention(s) Repositioned;Ambulation/increased activity;Distraction;Therapeutic presence  -     Row Name 07/17/22 0940          Cognition    Affect/Mental Status (Cognition) WFL  -DK     Orientation Status (Cognition) oriented x 4  -DK     Follows Commands (Cognition) WFL  -DK     Cognitive Function WFL  -DK     Personal Safety Interventions gait belt;nonskid shoes/slippers when out of bed;supervised activity  -DK     Row Name 07/17/22 0940          Bed Mobility    Bed Mobility supine-sit  -DK     Supine-Sit [place] (Bed Mobility) standby assist  -DK     Sit-Supine [place] (Bed Mobility) standby assist  -     Row Name 07/17/22 0940          Transfers    Transfers sit-stand transfer;stand-sit transfer  -DK     Sit-Stand [place] (Transfers) contact guard;1 person assist  -DK     Stand-Sit [place] (Transfers) contact guard;1 person assist  -DK     [facility] Name 07/17/22 0940          Gait/Stairs (Locomotion)    Gait/Stairs Locomotion gait/ambulation independence;gait/ambulation assistive device;distance ambulated;gait pattern  -DK     [place] Level (Gait) contact guard;1 person assist  -DK     Assistive Device (Gait) --  no assistive device  -DK     Distance in Feet (Gait) 6  2' x 1, 4' x 1  -DK     Pattern (Gait) step-to  -DK     Deviations/Abnormal Patterns (Gait) festinating/shuffling  -DK     Bilateral Gait Deviations forward  flexed posture  -DK     Comment, (Gait/Stairs) Pt ambulated bed to BSC, then BSC to chair with O2, no assistive device and assist x 1.  She was left in the recliner on alert post treatment.  -     Row Name 07/17/22 0940          Safety Issues, Functional Mobility    Safety Issues Affecting Function (Mobility) awareness of need for assistance;impulsivity;judgment;safety precaution awareness  -DK     Impairments Affecting Function (Mobility) balance;cognition;endurance/activity tolerance;pain;shortness of breath;strength  -     Cognitive Impairments, Mobility Safety/Performance attention;awareness, need for assistance;impulsivity;judgment;safety precaution awareness  -     Row Name 07/17/22 0940          Balance    Balance Assessment standing dynamic balance  -     Dynamic Standing Balance contact guard;1-person assist  -DK     Position/Device Used, Standing Balance --  no assistive device  -DK     Balance Interventions standing;dynamic;tandem gait  -     Row Name 07/17/22 0940          Motor Skills    Motor Skills --  therapeutic exercises  -     Coordination WFL  -     Therapeutic Exercise hip;knee;ankle  -     Row Name 07/17/22 0940          Hip (Therapeutic Exercise)    Hip (Therapeutic Exercise) AAROM (active assistive range of motion)  -     Hip AAROM (Therapeutic Exercise) bilateral;flexion;extension;aBduction;aDduction;supine;10 repetitions;2 sets  -     Row Name 07/17/22 0940          Knee (Therapeutic Exercise)    Knee (Therapeutic Exercise) AAROM (active assistive range of motion)  -     Knee AAROM (Therapeutic Exercise) bilateral;flexion;extension;supine;10 repetitions;2 sets  -     Row Name 07/17/22 0940          Ankle (Therapeutic Exercise)    Ankle (Therapeutic Exercise) AAROM (active assistive range of motion)  -     Ankle AAROM (Therapeutic Exercise) bilateral;dorsiflexion;plantarflexion;supine;10 repetitions;2 sets  -     Row Name 07/17/22 0940          Plan of Care Review     Plan of Care Reviewed With patient  -DK     Progress improving  -DK     Row Name 07/17/22 0940          Positioning and Restraints    Pre-Treatment Position in bed  -DK     Post Treatment Position chair  -DK     In Chair reclined;call light within reach;encouraged to call for assist;exit alarm on;legs elevated  -DK     Row Name 07/17/22 0940          Therapy Assessment/Plan (PT)    Rehab Potential (PT) good, to achieve stated therapy goals  -DK     Criteria for Skilled Interventions Met (PT) skilled treatment is necessary  -DK     Therapy Frequency (PT) daily  -DK     Problem List (PT) problems related to;balance;mobility;strength;pain;hearing  -DK     Activity Limitations Related to Problem List (PT) unable to ambulate safely;unable to transfer safely  -DK     Row Name 07/17/22 0940          Progress Summary (PT)    Progress Toward Functional Goals (PT) progress toward functional goals is good  -DK           User Key  (r) = Recorded By, (t) = Taken By, (c) = Cosigned By    Initials Name Provider Type    Bekah Hernandez PTA Physical Therapist Assistant                  PT Recommendation and Plan  Planned Therapy Interventions (PT): balance training, bed mobility training, gait training, strengthening, transfer training  Therapy Frequency (PT): daily  Progress Summary (PT)  Progress Toward Functional Goals (PT): progress toward functional goals is good  Plan of Care Reviewed With: patient  Progress: improving   Outcome Measures     Row Name 07/17/22 0940 07/16/22 1200 07/14/22 1300       How much help from another person do you currently need...    Turning from your back to your side while in flat bed without using bedrails? 4  -DK 4  -RH 4  -DP    Moving from lying on back to sitting on the side of a flat bed without bedrails? 4  -DK 4  -RH 3  -DP    Moving to and from a bed to a chair (including a wheelchair)? 3  -DK 3  -RH 3  -DP    Standing up from a chair using your arms (e.g., wheelchair, bedside chair)?  3  -DK 3  -RH 3  -DP    Climbing 3-5 steps with a railing? 2  -DK 3  -RH 3  -DP    To walk in hospital room? 3  -DK 3  -RH 3  -DP    AM-PAC 6 Clicks Score (PT) 19  -DK 20  -RH 19  -DP       Functional Assessment    Outcome Measure Options AM-PAC 6 Clicks Basic Mobility (PT)  -DK -- AM-PAC 6 Clicks Basic Mobility (PT)  -DP          User Key  (r) = Recorded By, (t) = Taken By, (c) = Cosigned By    Initials Name Provider Type    Uli Schrader PTA Physical Therapist Assistant    Bekah Hernandez PTA Physical Therapist Assistant    Neymar eTllez, PT Physical Therapist                 Time Calculation:    PT Charges     Row Name 07/17/22 0946             Time Calculation    PT Received On 07/17/22  -DK      PT Goal Re-Cert Due Date 07/23/22  -DK              Timed Charges    52026 - PT Therapeutic Exercise Minutes 14  -DK      02520 - Gait Training Minutes  4  -DK      88970 - PT Therapeutic Activity Minutes 10  -DK              Total Minutes    Timed Charges Total Minutes 28  -DK       Total Minutes 28  -DK            User Key  (r) = Recorded By, (t) = Taken By, (c) = Cosigned By    Initials Name Provider Type    Bekah Hernandez PTA Physical Therapist Assistant              Therapy Charges for Today     Code Description Service Date Service Provider Modifiers Qty    83961964401 HC PT THER PROC EA 15 MIN 7/17/2022 Bekah Colon PTA GP 1    55219637195 HC PT THERAPEUTIC ACT EA 15 MIN 7/17/2022 Bekah Colon PTA GP 1          PT G-Codes  Outcome Measure Options: AM-PAC 6 Clicks Basic Mobility (PT)  AM-PAC 6 Clicks Score (PT): 19  AM-PAC 6 Clicks Score (OT): 17    Bekah Colon PTA  7/17/2022

## 2022-07-18 NOTE — PLAN OF CARE
Goal Outcome Evaluation:  Plan of Care Reviewed With: patient           Outcome Evaluation: vss. slept in chair. denies c/o. see dc planning.

## 2022-07-18 NOTE — PROGRESS NOTES
[facility]   Hospitalist Progress Note  Date: 2022  Patient Name: Stacey Juarez  : 1931  MRN: [id number]  Date of admission: 2022  Consultants:   -Cardiology: Dr. Waqas Ramírez    Subjective   Subjective     Chief Complaint: Syncope    Summary:   Stacey Juarez is a 89 y.o. female with COPD, hypertension, chronic hypoxic respiratory failure, CAD, paroxysmal atrial fibrillation on Eliquis who presented to the ED after syncopal episode while sitting in the car.  Orthostatic vital signs positive in ED.  Labs demonstrated elevated creatinine and hyperkalemia.  Hospitalist service contacted for further evaluation management.  Cardiology consulted to assist in care.  Gentle IV fluid resuscitation started and patient's home antihypertensive medications held.  Creatinine improved with IV fluids.  Patient's blood pressure improved with fluids and holding home antihypertensive medications.  Potassium level improved as well. Echo completed.  EF: 61-65%.  Grade 1 diastolic dysfunction noted. At discharge patient will not be restarted on spironolactone or benazepril.  Patient evaluated by PT/OT and rehab recommended, referrals sent.    Interval Followup:   No acute events overnight.  Patient tolerating diet.  Continues to feel weak and fatigued.  Denied any chest pain, shortness breath, abdominal pain, nausea or vomiting.  Nursing with no additional acute issues to report.    Review of Systems   All systems reviewed and negative unless stated otherwise under subjective.    Objective   Objective     Vitals:   Temp:  [97.4 °F (36.3 °C)-99.1 °F (37.3 °C)] 98.1 °F (36.7 °C)  Heart Rate:  [] 83  Resp:  [16-18] 18  BP: (106-123)/(44-79) 106/58  Flow (L/min):  [2] 2  Physical Exam   Gen: No acute distress, Conversant, Pleasant, sitting up in chair bedside watching TV  HEENT: MMM, Atraumatic  Neck: Supple, Trachea midline  Resp: CTAB, No w/r/r, good aeration, equal chest rise bilaterally, no increase in work  of breathing  Card: RRR, No m/r/g  Abd: Soft, Nontender, Nondistended, + bowel sounds  Ext: No cyanosis, No clubbing  Neuro: CN II-XII grossly intact, No focal deficits appreciated  Psych: AAO x 2 (self, place), Normal mood, Normal affect    Result Review    Result Review:  I have personally reviewed the results from the time of this admission to 7/18/2022 10:07 EDT and agree with these findings:  []  Laboratory:   []  Microbiology:   []  Radiology:   []  EKG/Telemetry:   []  Cardiology/Vascular:   []  Pathology:  []  Old records:  []  Other:    Assessment & Plan   Assessment / Plan     Assessment:  Syncope  Acute on chronic renal failure (baseline creatinine around 1.3), resolved  Hyperkalemia, improved  Orthostatic hypotension, improved  Paroxysmal atrial fibrillation on chronic anticoagulation with Eliquis  Essential hypertension  Alzheimer's dementia  History of coronary artery disease  Hyperlipidemia  History of essential hypertension  COPD, not acutely exacerbated  Chronic hypoxic respiratory failure  Chronic diastolic heart failure, not acutely exacerbated    Plan:  -Cardiology consulted and following, appreciate assistance and recommendations in the care of this patient.  -Continue home amlodipine  -Continue holding home benazepril and spironolactone and these medications not be resumed at time of discharge  -Continue appropriate home medications  -Encouraged p.o. intake  -Will give patient lab holiday on 07/18/2022 unless there is an acute change in the patient's condition  -Clinical course will dictate further management     DVT Prophylaxis: Apixaban  Diet: Regular  Dispo: Social work to send referrals for rehab placement  Code Status: Full code     Personally reviewed patients labs and imaging, discussed with patient and nurse at bedside.      Part of this note may be an electronic transcription/translation of spoken language to printed text using the Dragon dictation system.    DVT prophylaxis:  Medical  DVT prophylaxis orders are present.    CODE STATUS:   Code Status (Patient has no pulse and is not breathing): CPR (Attempt to Resuscitate)  Medical Interventions (Patient has pulse or is breathing): Full Support      Electronically signed by Gabriel Laureano MD, 07/18/22, 10:07 AM EDT.

## 2022-07-18 NOTE — PLAN OF CARE
Goal Outcome Evaluation:  Plan of Care Reviewed With: patient        Progress: improving   VSS. Pt up in chair most of day. Currently resting in bed. Awaiting placement. No concerns

## 2022-07-19 NOTE — PLAN OF CARE
Goal Outcome Evaluation:  Plan of Care Reviewed With: patient        Progress: no change  Outcome Evaluation: vss. refused bath last night. pt reports she is more confused-intermittent confusion noted by staff. refused snack. affect flat and withdrawn. pt slept well. occasional urinary incontinence. up to bsc with 1 assist.

## 2022-07-19 NOTE — SIGNIFICANT NOTE
07/19/22 0847   Plan   Final Discharge Disposition Code 03 - skilled nursing facility (SNF)   Final Note Pt has a bed available at [facility] today.      [Yes] : Yes [No] : In the past 12 months have you used drugs other than those required for medical reasons? No [2 - 4 times a month (2 pts)] : 2-4 times a month (2 points) [1 or 2 (0 pts)] : 1 or 2 (0 points) [Never (0 pts)] : Never (0 points) [0] : 2) Feeling down, depressed, or hopeless: Not at all (0) [Patient reported mammogram was normal] : Patient reported mammogram was normal [None] : None [Employed] : employed [Fully functional (using the telephone, shopping, preparing meals, housekeeping, doing laundry, using] : Fully functional and needs no help or supervision to perform IADLs (using the telephone, shopping, preparing meals, housekeeping, doing laundry, using transportation, managing medications and managing finances) [Feels Safe at Home] : Feels safe at home [Fully functional (bathing, dressing, toileting, transferring, walking, feeding)] : Fully functional (bathing, dressing, toileting, transferring, walking, feeding) [Carbon Monoxide Detector] : carbon monoxide detector [Smoke Detector] : smoke detector [Seat Belt] :  uses seat belt [Sunscreen] : uses sunscreen [RCL2[place]ok] : 0 [] : No [Audit-CScore] : 2 [EyeExamDate] : 10/19 [Change in mental status noted] : No change in mental status noted [Reports changes in hearing] : Reports no changes in hearing [Reports changes in vision] : Reports no changes in vision [Reports changes in dental health] : Reports no changes in dental health [ColonoscopyDate] : 2018 [MammogramDate] : 1/19

## 2022-07-19 NOTE — DISCHARGE SUMMARY
[facility]         HOSPITALIST  DISCHARGE SUMMARY    Patient Name: Stacey Juarez  : 1931  MRN: [id number]    Date of Admission: 2022  Date of Discharge:  2022 to [facility]  Primary Care Physician: Jered Hewitt PA  Admitting Service: Hospital Medicine  Consultants: Cardiology    Final Diagnoses:  Syncope  Acute on chronic renal failure (baseline creatinine around 1.3), resolved  Hyperkalemia, improved  Orthostatic hypotension, improved  Paroxysmal atrial fibrillation on chronic anticoagulation with Eliquis  Essential hypertension  Alzheimer's dementia  History of coronary artery disease  Hyperlipidemia  History of essential hypertension  COPD, not acutely exacerbated  Chronic hypoxic respiratory failure  Chronic diastolic heart failure, not acutely exacerbated        Hospital Course     Hospital Course:  Stacey Juarez is a 89 y.o. female with COPD, hypertension, chronic hypoxic respiratory failure, CAD, paroxysmal atrial fibrillation on Eliquis who presented to the ED after syncopal episode while sitting in the car.  Orthostatic vital signs positive in ED.  Labs demonstrated elevated creatinine and hyperkalemia.  Hospitalist service contacted for further evaluation management.  Cardiology consulted to assist in care.  Gentle IV fluid resuscitation started and patient's home antihypertensive medications held.  Creatinine improved with IV fluids.  Patient's blood pressure improved with fluids and holding home antihypertensive medications.  Potassium level improved as well. Echo completed.  EF: 61-65%.  Grade 1 diastolic dysfunction noted. At discharge patient will not be restarted on spironolactone or benazepril.  Patient evaluated by PT/OT and rehab recommended, accepted to [facility] where she will go today.      DISCHARGE Follow Up Recommendations for labs and diagnostics: f/u with cardiology; CBC, BMP in 1 week recommended at [facility]  of Discharge     Vital Signs:  Temp:  [97.4 °F (36.3 °C)-98.6 °F (37 °C)] 97.4 °F (36.3 °C)  Heart Rate:  [66-94] 73  Resp:  [18] 18  BP: (118-138)/(47-87) 120/53  Flow (L/min):  [2] 2  Physical Exam: nad, abd s/nd/nt      Discharge Details        Discharge Medications      New Medications      Instructions Start Date   acetaminophen 325 MG tablet  Commonly known as: TYLENOL   650 mg, Oral, Every 4 Hours PRN      amLODIPine 5 MG tablet  Commonly known as: NORVASC   5 mg, Oral, Daily   Start Date: July 20, 2022     nitroglycerin 0.4 MG SL tablet  Commonly known as: NITROSTAT   0.4 mg, Sublingual, Every 5 Minutes PRN, Take no more than 3 doses in 15 minutes.      ondansetron 4 MG tablet  Commonly known as: ZOFRAN   4 mg, Oral, Every 6 Hours PRN         Changes to Medications      Instructions Start Date   albuterol sulfate  (90 Base) MCG/ACT inhaler  Commonly known as: PROVENTIL HFA;VENTOLIN HFA;PROAIR HFA  What changed: See the new instructions.   INHALE 2 PUFFS BY MOUTH EVERY 4 TO 6 HOURS AS NEEDED FOR DIFFICULT BREATHING      FLUoxetine 10 MG capsule  Commonly known as: PROzac  What changed: See the new instructions.   TAKE 1 CAPSULE(10 MG) BY MOUTH EVERY DAY      Stiolto Respimat 2.5-2.5 MCG/ACT aerosol solution inhaler  Generic drug: tiotropium bromide-olodaterol  What changed: See the new instructions.   INHALE 2 PUFFS EVERY DAY         Continue These Medications      Instructions Start Date   alendronate 70 MG tablet  Commonly known as: FOSAMAX   70 mg, Oral, Weekly      atorvastatin 80 MG tablet  Commonly known as: LIPITOR   80 mg, Oral, Daily      budesonide 0.5 MG/2ML nebulizer solution  Commonly known as: PULMICORT   0.5 mg, Nebulization, 2 times daily      donepezil 5 MG tablet  Commonly known as: ARICEPT   5 mg, Oral, Nightly      Eliquis 2.5 MG tablet tablet  Generic drug: apixaban   TAKE 1 TABLET BY MOUTH TWICE DAILY      ferrous sulfate 325 (65 FE) MG EC tablet   325 mg, Oral, 2 Times Daily       ipratropium-albuterol 0.5-2.5 mg/3 ml nebulizer  Commonly known as: DUO-NEB   3 mL, Nebulization, 4 Times Daily - RT      montelukast 10 MG tablet  Commonly known as: SINGULAIR   TAKE 1 TABLET BY MOUTH AT BEDTIME      vitamin B-12 1000 MCG tablet  Commonly known as: CYANOCOBALAMIN   1,000 mcg, Oral, Daily      vitamin D 1.25 MG (99673 UT) capsule capsule  Commonly known as: ERGOCALCIFEROL   50,000 Units, Oral, Weekly         Stop These Medications    amLODIPine-benazepril 5-20 MG per capsule  Commonly known as: LOTREL 5-20     aspirin 81 MG chewable tablet     MAGnesium-Oxide 400 (240 Mg) MG tablet  Generic drug: Magnesium Oxide     potassium chloride 20 mEq/15 mL solution  Commonly known as: KAYCIEL     spironolactone 25 MG tablet  Commonly known as: ALDACTONE            Allergies   Allergen Reactions   • Penicillins Anaphylaxis       Discharge Disposition:  Skilled Nursing Facility ([place] - [place])    Diet:  Hospital:  Diet Order   Procedures   • Diet Regular       Discharge Activity:       CODE STATUS:  Code Status and Medical Interventions:   Ordered at: 07/13/22 1526     Code Status (Patient has no pulse and is not breathing):    CPR (Attempt to Resuscitate)     Medical Interventions (Patient has pulse or is breathing):    Full Support         Future Appointments   Date Time Provider Department Center   9/19/2022 10:15 AM Jasmeet Whiteside MD [facility]   11/28/2022 12:15 PM Jered Hewitt PA [facility]           Pertinent  and/or Most Recent Results     PROCEDURES:   none    LAB RESULTS:      Lab 07/17/22  0546 07/16/22  0440 07/15/22  0422 07/14/22  0502 07/13/22  1049   WBC 6.55 5.48 5.98 5.32 6.14   HEMOGLOBIN 9.2* 8.9* 9.1* 9.1* 10.0*   HEMATOCRIT 30.1* 29.7* 30.4* 30.9* 33.4*   PLATELETS 181 180 206 207 235   NEUTROS ABS  --   --   --   --  4.74   IMMATURE GRANS (ABS)  --   --   --   --  0.03   LYMPHS ABS  --   --   --   --  0.89   MONOS ABS  --   --   --   --  0.44   EOS ABS  --   --   --   --   0.03   MCV 86.0 86.8 87.9 88.8 88.4         Lab 07/17/22  0546 07/16/22  0440 07/15/22  0422 07/14/22  0502 07/13/22  1049   SODIUM 134* 136 136 136 135*   POTASSIUM 3.9 4.7 4.5 4.8 5.3*   CHLORIDE 99 102 102 103 100   CO2 25.9 27.2 26.5 25.8 24.3   ANION GAP 9.1 6.8 7.5 7.2 10.7   BUN 15 16 22 34* 41*   CREATININE 1.10* 1.05* 1.21* 1.61* 2.32*   EGFR 47.8* 50.6* 42.7* 30.3* 19.5*   GLUCOSE 86 81 82 89 121*   CALCIUM 9.7* 9.9* 9.9* 9.9* 10.5*   MAGNESIUM 2.1 1.7 1.7 2.2 2.6*   PHOSPHORUS 2.9 2.9 3.1 3.2  --          Lab 07/13/22  1049   TOTAL PROTEIN 7.1   ALBUMIN 4.10   GLOBULIN 3.0   ALT (SGPT) <5   AST (SGOT) 12   BILIRUBIN 0.5   ALK PHOS 97         Lab 07/13/22  1049   PROBNP 113.2   TROPONIN T <0.010                 Brief Urine Lab Results  (Last result in the past 365 days)      Color   Clarity   Blood   Leuk Est   Nitrite   Protein   CREAT   Urine HCG        07/13/22 1424 Yellow   Cloudy   Negative   Negative   Negative   Negative               Microbiology Results (last 10 days)     Procedure Component Value - Date/Time    COVID PRE-OP / PRE-PROCEDURE SCREENING ORDER (NO ISOLATION) - Swab, Nasopharynx [[id number]]  (Normal) Collected: 07/18/22 1439    Lab Status: Final result Specimen: Swab from Nasopharynx Updated: 07/18/22 1844    Narrative:      The following orders were created for panel order COVID PRE-OP / PRE-PROCEDURE SCREENING ORDER (NO ISOLATION) - Swab, Nasopharynx.  Procedure                               Abnormality         Status                     ---------                               -----------         ------                     COVID-19,APTIMA PANTHER(...[[id number]]  Normal              Final result                 Please view results for these tests on the individual orders.    [facility]/OP SWAB IN UTM/VTM/SALINE TRANSPORT MEDIA,24 HR TAT - Swab, Nasopharynx [[id number]]  (Normal) Collected: 07/18/22 1439    Lab Status: Final result Specimen: Swab from  Nasopharynx Updated: 07/18/22 1844     COVID19 Not Detected    Narrative:      Fact sheet for providers: https://www.fda.gov/media/140176/download     Fact sheet for patients: https://www.fda.gov/media/450909/download    Test performed by RT PCR.          CT Head Without Contrast    Result Date: 7/13/2022  Impression:   1. Volume loss secondary to cerebral atrophy. 2. Chronic ischemic changes. 3. No acute findings.     MAGEN FOLEY MD       Electronically Signed and Approved By: MAGEN FOLEY MD on 7/13/2022 at 13:28             XR Chest 1 View    Result Date: 7/13/2022  Impression:   1. Cardiomegaly. 2. Emphysema and chronic scarring. 3. Questionable tiny right pleural effusion.       MAGEN FOLEY MD       Electronically Signed and Approved By: MAGEN FOLEY MD on 7/13/2022 at 12:43                       Results for orders placed during the hospital encounter of 07/13/22    Adult Transthoracic Echo Complete w/ Color, Spectral and Contrast if necessary per protocol    Interpretation Summary  · Left ventricular ejection fraction appears to be 61 - 65%. Left ventricular systolic function is normal.  · Left ventricular wall thickness is consistent with mild concentric hypertrophy.  · Left ventricular diastolic function is consistent with (grade I) impaired relaxation.  · There is moderate dilation of the ascending aorta measuring 4.2 cm.  · There is mild tricuspid regurgitation. Estimated right ventricular systolic pressure from tricuspid regurgitation is mildly elevated (35-45 mmHg).      Labs Pending at Discharge:    Condition at DC: Stable for DC    Time spent on Discharge including face to face service:  40 minutes    Electronically signed by Ced Yancey MD, 07/19/22, 12:07 PM EDT.

## 2022-07-19 NOTE — PROGRESS NOTES
"[facility]   Hospitalist Progress Note  Date: 2022  Patient Name: Stacey Juarez  : 1931  MRN: [id number]  Date of admission: 2022  Consultants:   -Cardiology: Dr. Waqas Ramírez    Subjective   Subjective     Chief Complaint: Syncope    Summary:   Stacey Juarez is a 89 y.o. female with COPD, hypertension, chronic hypoxic respiratory failure, CAD, paroxysmal atrial fibrillation on Eliquis who presented to the ED after syncopal episode while sitting in the car.  Orthostatic vital signs positive in ED.  Labs demonstrated elevated creatinine and hyperkalemia.  Hospitalist service contacted for further evaluation management.  Cardiology consulted to assist in care.  Gentle IV fluid resuscitation started and patient's home antihypertensive medications held.  Creatinine improved with IV fluids.  Patient's blood pressure improved with fluids and holding home antihypertensive medications.  Potassium level improved as well. Echo completed.  EF: 61-65%.  Grade 1 diastolic dysfunction noted. At discharge patient will not be restarted on spironolactone or benazepril.  Patient evaluated by PT/OT and rehab recommended, referrals sent.    Interval Followup:   No acute issues.  COVID test was negative, will go to rehab.    Review of Systems  All systems reviewed and negative unless stated otherwise under \"Interval followup\"    Objective   Objective     Vitals:   Temp:  [97.6 °F (36.4 °C)-98.7 °F (37.1 °C)] 97.6 °F (36.4 °C)  Heart Rate:  [66-94] 66  Resp:  [18] 18  BP: (117-138)/(47-87) 118/47  Flow (L/min):  [2] 2  Physical Exam   Gen: No acute distress, pleasant  HEENT: MMM, Atraumatic  Neck: Supple, Trachea midline  Resp: CTAB, No w/r/r, good aeration, equal chest rise bilaterally, no increase in work of breathing  Card: RRR, No m/r/g  Abd: Soft, Nontender, Nondistended, + bowel sounds  Ext: No cyanosis, No clubbing  Neuro: CN II-XII grossly intact, No focal deficits appreciated  Psych: AAO x 2 (self, " place), Normal mood, Normal affect    Result Review    Result Review:  I have personally reviewed the most recent results:  [x]  Laboratory:  covid negative  []  Microbiology:   []  Radiology:   []  EKG/Telemetry:   []  Cardiology/Vascular:   []  Pathology:  []  Old records:  []  Other:    Assessment & Plan   Assessment / Plan     Assessment:  Syncope  Acute on chronic renal failure (baseline creatinine around 1.3), resolved  Hyperkalemia, improved  Orthostatic hypotension, improved  Paroxysmal atrial fibrillation on chronic anticoagulation with Eliquis  Essential hypertension  Alzheimer's dementia  History of coronary artery disease  Hyperlipidemia  History of essential hypertension  COPD, not acutely exacerbated  Chronic hypoxic respiratory failure  Chronic diastolic heart failure, not acutely exacerbated    Plan:  -Cardiology consulted and following, appreciate assistance and recommendations in the care of this patient.  -Continue home amlodipine  -Continue holding home benazepril and spironolactone and these medications not be resumed at time of discharge  -Continue appropriate home medications  -Encouraged p.o. intake  -COVID test negative --> dc today     DVT Prophylaxis: Apixaban  Diet: Regular  Dispo: Possible DC today  Code Status: Full code     DVT prophylaxis:  Medical DVT prophylaxis orders are present.    CODE STATUS:   Code Status (Patient has no pulse and is not breathing): CPR (Attempt to Resuscitate)  Medical Interventions (Patient has pulse or is breathing): Full Support      Electronically signed by Ced Yancey MD, 07/19/22, 12:02 PM EDT.

## 2022-08-31 PROBLEM — R41.82 ALTERED MENTAL STATUS, UNSPECIFIED ALTERED MENTAL STATUS TYPE: Status: ACTIVE | Noted: 2022-01-01

## 2022-09-02 PROBLEM — E43 SEVERE MALNUTRITION (HCC): Status: ACTIVE | Noted: 2022-01-01

## 2024-09-25 NOTE — THERAPY EVALUATION
Acute Care - Physical Therapy Initial Evaluation  MARYA Baker     Patient Name: Stacey Juarez  : 1931  MRN: [id number]  Today's Date: 2022      Visit Dx:     ICD-10-CM ICD-9-CM   1. Near syncope  R55 780.2   2. Dehydration  E86.0 276.51   3. Hypotension, unspecified hypotension type  I95.9 458.9   4. Hyperkalemia  E87.5 276.7   5. Headache, unspecified headache type  R51.9 784.0   6. Decreased activities of daily living (ADL)  Z78.9 V49.89   7. Difficulty walking  R26.2 719.7     Patient Active Problem List   Diagnosis   • Alzheimer's disease ([facility])   • Pure hypercholesterolemia   • Essential hypertension   • Hypoxia   • Supplemental oxygen dependent   • Mixed hyperlipidemia   • Paroxysmal atrial fibrillation ([facility])   • Coronary artery disease involving native coronary artery of native heart without angina pectoris   • Syncope     Past Medical History:   Diagnosis Date   • AAA (abdominal aortic aneurysm) ([facility])    • Acute mesenteric ischemia ([facility]) 03/10/2015   • Arthritis    • Benign essential hypertension    • Bladder disorder    • Bladder problem    • Cataract    • Colon disorder    • COPD (chronic obstructive pulmonary disease) ([facility])    • Dementia ([facility])    • Diverticulitis    • Essential hypertension 05/10/2017   • Heart attack ([facility])    • Hemorrhoids    • Limb swelling    • Mixed hyperlipidemia    • Myocardial infarction ([facility])    • Osteoporosis 2019   • Oxygen dependent 2020   • Paroxysmal A-fib ([facility]) 2017   • Respiratory failure ([facility]) 2020   • Tuberculosis     OR POSITIVE PPD TEST   • Vitamin D deficiency 2015     Past Surgical History:   Procedure Laterality Date   • BACK SURGERY     • BLADDER SURGERY      RECONSTRUCTION   • BREAST SURGERY     • CAROTID ENDARTERECTOMY     • COLONOSCOPY     • EYE SURGERY      EYE IMPLANT   • HYSTERECTOMY     • LUMBAR DISC SURGERY     • OTHER SURGICAL HISTORY      JOINT SURGERY UNSPECIFIED     PT Assessment (last 12 hours)      PT Evaluation and Treatment     Row Name 07/14/22 1300          Physical Therapy Time and Intention    Subjective Information no complaints  -DP     Document Type evaluation  -DP     Mode of Treatment individual therapy;physical therapy  -DP     Patient Effort excellent  -DP     Row Name 07/14/22 1300          General Information    Patient Profile Reviewed yes  -DP     Patient Observations cooperative;alert;agree to therapy  -DP     Prior Level of Function independent:;gait;transfer;bed mobility;ADL's  -DP     Equipment Currently Used at Home none  -DP     Existing Precautions/Restrictions fall;oxygen therapy device and L/min  -DP     Row Name 07/14/22 1300          Living Environment    Current Living Arrangements home  -DP     Home Accessibility stairs to enter home  -DP     People in Home alone  -DP     Row Name 07/14/22 1300          Home Main Entrance    Number of Stairs, Main Entrance six  -DP     Row Name 07/14/22 1300          Range of Motion (ROM)    Range of Motion bilateral lower extremities;ROM is WFL  -DP     Row Name 07/14/22 1300          Strength (Manual Muscle Testing)    Strength (Manual Muscle Testing) bilateral lower extremities  4-/5  -DP     Row Name 07/14/22 1300          Bed Mobility    Bed Mobility supine-sit;sit-supine  -DP     Supine-Sit Sherrill (Bed Mobility) contact guard  -DP     Sit-Supine Sherrill (Bed Mobility) contact guard  -DP     Row Name 07/14/22 1300          Transfers    Transfers sit-stand transfer  -DP     Sit-Stand Sherrill (Transfers) contact guard  -DP     Row Name 07/14/22 1300          Gait/Stairs (Locomotion)    Gait/Stairs Locomotion gait/ambulation assistive device  -DP     Sherrill Level (Gait) contact guard  -DP     Assistive Device (Gait) walker, front-wheeled  -DP     Distance in Feet (Gait) 5  -DP     Row Name 07/14/22 1300          Balance    Balance Assessment standing dynamic balance  -DP     Dynamic Standing Balance contact guard  -DP      Row Name 07/14/22 1300          Plan of Care Review    Plan of Care Reviewed With patient  -DP     Outcome Evaluation Patient presents with decreased strength, transfers, and functional mobility.  She will benefit from skilled PT services to address these deficits.  She will also benefit from placement in a rehab facility to maximize independence with functional mobility.  -DP     Row Name 07/14/22 1300          Therapy Assessment/Plan (PT)    Rehab Potential (PT) good, to achieve stated therapy goals  -DP     Criteria for Skilled Interventions Met (PT) yes;meets criteria  -DP     Therapy Frequency (PT) daily  -DP     Predicted Duration of Therapy Intervention (PT) 10 days  -DP     Problem List (PT) problems related to;balance;mobility  -DP     Activity Limitations Related to Problem List (PT) unable to transfer safely;unable to ambulate safely  -DP     Row Name 07/14/22 1300          PT Evaluation Complexity    History, PT Evaluation Complexity no personal factors and/or comorbidities  -DP     Examination of Body Systems (PT Eval Complexity) total of 4 or more elements  -DP     Clinical Presentation (PT Evaluation Complexity) stable  -DP     Clinical Decision Making (PT Evaluation Complexity) low complexity  -DP     Overall Complexity (PT Evaluation Complexity) low complexity  -DP     Row Name 07/14/22 1300          Physical Therapy Goals    Transfer Goal Selection (PT) transfer, PT goal 1  -DP     Gait Training Goal Selection (PT) gait training, PT goal 1  -DP     Row Name 07/14/22 1300          Transfer Goal 1 (PT)    Activity/Assistive Device (Transfer Goal 1, PT) sit-to-stand/stand-to-sit  -DP     Corinne Level/Cues Needed (Transfer Goal 1, PT) supervision required  -DP     Time Frame (Transfer Goal 1, PT) 10 days  -DP     Row Name 07/14/22 1300          Gait Training Goal 1 (PT)    Activity/Assistive Device (Gait Training Goal 1, PT) walker, rolling  -DP     Corinne Level (Gait Training Goal 1,  PT) supervision required  -DP     Distance (Gait Training Goal 1, PT) 200  -DP     Time Frame (Gait Training Goal 1, PT) 10 days  -DP           User Key  (r) = Recorded By, (t) = Taken By, (c) = Cosigned By    Initials Name Provider Type    Neymar Tellez PT Physical Therapist                  PT Recommendation and Plan  Anticipated Discharge Disposition (PT): skilled nursing facility, sub acute care setting, inpatient rehabilitation facility  Planned Therapy Interventions (PT): balance training, bed mobility training, gait training, strengthening, transfer training  Therapy Frequency (PT): daily  Plan of Care Reviewed With: patient  Outcome Evaluation: Patient presents with decreased strength, transfers, and functional mobility.  She will benefit from skilled PT services to address these deficits.  She will also benefit from placement in a rehab facility to maximize independence with functional mobility.   Outcome Measures     Row Name 07/14/22 1300             How much help from another person do you currently need...    Turning from your back to your side while in flat bed without using bedrails? 4  -DP      Moving from lying on back to sitting on the side of a flat bed without bedrails? 3  -DP      Moving to and from a bed to a chair (including a wheelchair)? 3  -DP      Standing up from a chair using your arms (e.g., wheelchair, bedside chair)? 3  -DP      Climbing 3-5 steps with a railing? 3  -DP      To walk in hospital room? 3  -DP      AM-PAC 6 Clicks Score (PT) 19  -DP              Functional Assessment    Outcome Measure Options AM-PAC 6 Clicks Basic Mobility (PT)  -DP            User Key  (r) = Recorded By, (t) = Taken By, (c) = Cosigned By    Initials Name Provider Type    Neymar Tellez PT Physical Therapist                 Time Calculation:    PT Charges     Row Name 07/14/22 4629             Time Calculation    PT Received On 07/14/22  -DP      PT Goal Re-Cert Due Date 07/23/22  -DP               Untimed Charges    PT Eval/Re-eval Minutes 40  -DP              Total Minutes    Untimed Charges Total Minutes 40  -DP       Total Minutes 40  -DP            User Key  (r) = Recorded By, (t) = Taken By, (c) = Cosigned By    Initials Name Provider Type    DP Neymar Vanegas, PT Physical Therapist              Therapy Charges for Today     Code Description Service Date Service Provider Modifiers Qty    [id number] HC PT EVAL LOW COMPLEXITY 3 7/14/2022 Neymar Vanegas, PT GP 1          PT G-Codes  Outcome Measure Options: AM-PAC 6 Clicks Basic Mobility (PT)  AM-PAC 6 Clicks Score (PT): 19  AM-PAC 6 Clicks Score (OT): 17    Neymar Vanegas PT  7/14/2022     No